# Patient Record
Sex: MALE | Race: WHITE | NOT HISPANIC OR LATINO | Employment: OTHER | URBAN - METROPOLITAN AREA
[De-identification: names, ages, dates, MRNs, and addresses within clinical notes are randomized per-mention and may not be internally consistent; named-entity substitution may affect disease eponyms.]

---

## 2017-10-23 ENCOUNTER — OFFICE VISIT - HEALTHEAST (OUTPATIENT)
Dept: FAMILY MEDICINE | Facility: CLINIC | Age: 32
End: 2017-10-23

## 2017-10-23 DIAGNOSIS — F33.42 RECURRENT MAJOR DEPRESSIVE DISORDER, IN FULL REMISSION (H): ICD-10-CM

## 2017-10-23 DIAGNOSIS — G43.009 MIGRAINE WITHOUT AURA AND WITHOUT STATUS MIGRAINOSUS, NOT INTRACTABLE: ICD-10-CM

## 2017-10-23 DIAGNOSIS — Z72.0 TOBACCO ABUSE: ICD-10-CM

## 2017-10-23 DIAGNOSIS — Z00.00 HEALTH CARE MAINTENANCE: ICD-10-CM

## 2017-10-23 DIAGNOSIS — F41.9 ANXIETY: ICD-10-CM

## 2017-10-23 DIAGNOSIS — I10 ESSENTIAL HYPERTENSION: ICD-10-CM

## 2017-10-23 LAB
CHOLEST SERPL-MCNC: 126 MG/DL
FASTING STATUS PATIENT QL REPORTED: YES
HDLC SERPL-MCNC: 29 MG/DL
LDLC SERPL CALC-MCNC: 73 MG/DL
TRIGL SERPL-MCNC: 119 MG/DL

## 2017-10-23 ASSESSMENT — MIFFLIN-ST. JEOR: SCORE: 2257.61

## 2017-11-27 ENCOUNTER — COMMUNICATION - HEALTHEAST (OUTPATIENT)
Dept: FAMILY MEDICINE | Facility: CLINIC | Age: 32
End: 2017-11-27

## 2017-12-07 ENCOUNTER — OFFICE VISIT - HEALTHEAST (OUTPATIENT)
Dept: FAMILY MEDICINE | Facility: CLINIC | Age: 32
End: 2017-12-07

## 2017-12-07 DIAGNOSIS — F33.1 MODERATE EPISODE OF RECURRENT MAJOR DEPRESSIVE DISORDER (H): ICD-10-CM

## 2017-12-07 DIAGNOSIS — M77.9 TENDONITIS: ICD-10-CM

## 2017-12-07 DIAGNOSIS — I10 ESSENTIAL HYPERTENSION: ICD-10-CM

## 2017-12-07 DIAGNOSIS — Z72.0 TOBACCO ABUSE: ICD-10-CM

## 2017-12-07 DIAGNOSIS — F41.9 ANXIETY: ICD-10-CM

## 2017-12-07 ASSESSMENT — MIFFLIN-ST. JEOR: SCORE: 2302.97

## 2018-02-28 ENCOUNTER — COMMUNICATION - HEALTHEAST (OUTPATIENT)
Dept: FAMILY MEDICINE | Facility: CLINIC | Age: 33
End: 2018-02-28

## 2018-03-28 ENCOUNTER — COMMUNICATION - HEALTHEAST (OUTPATIENT)
Dept: FAMILY MEDICINE | Facility: CLINIC | Age: 33
End: 2018-03-28

## 2019-03-12 ENCOUNTER — OFFICE VISIT - HEALTHEAST (OUTPATIENT)
Dept: FAMILY MEDICINE | Facility: CLINIC | Age: 34
End: 2019-03-12

## 2019-03-12 DIAGNOSIS — Z79.899 MEDICATION MANAGEMENT: ICD-10-CM

## 2019-03-12 DIAGNOSIS — Z00.00 ROUTINE GENERAL MEDICAL EXAMINATION AT A HEALTH CARE FACILITY: ICD-10-CM

## 2019-03-12 DIAGNOSIS — I10 ESSENTIAL HYPERTENSION: ICD-10-CM

## 2019-03-12 DIAGNOSIS — Z13.220 LIPID SCREENING: ICD-10-CM

## 2019-03-12 DIAGNOSIS — F17.200 SMOKING: ICD-10-CM

## 2019-03-12 DIAGNOSIS — F15.11 METHAMPHETAMINE ABUSE IN REMISSION (H): ICD-10-CM

## 2019-03-12 DIAGNOSIS — F33.0 MILD EPISODE OF RECURRENT MAJOR DEPRESSIVE DISORDER (H): ICD-10-CM

## 2019-03-12 DIAGNOSIS — E66.01 MORBID OBESITY (H): ICD-10-CM

## 2019-03-12 DIAGNOSIS — F41.9 ANXIETY: ICD-10-CM

## 2019-03-12 LAB
ANION GAP SERPL CALCULATED.3IONS-SCNC: 8 MMOL/L (ref 5–18)
BUN SERPL-MCNC: 14 MG/DL (ref 8–22)
CALCIUM SERPL-MCNC: 9.5 MG/DL (ref 8.5–10.5)
CHLORIDE BLD-SCNC: 106 MMOL/L (ref 98–107)
CHOLEST SERPL-MCNC: 145 MG/DL
CO2 SERPL-SCNC: 25 MMOL/L (ref 22–31)
CREAT SERPL-MCNC: 0.83 MG/DL (ref 0.7–1.3)
FASTING STATUS PATIENT QL REPORTED: YES
GFR SERPL CREATININE-BSD FRML MDRD: >60 ML/MIN/1.73M2
GLUCOSE BLD-MCNC: 103 MG/DL (ref 70–125)
HDLC SERPL-MCNC: 34 MG/DL
LDLC SERPL CALC-MCNC: 58 MG/DL
POTASSIUM BLD-SCNC: 4.1 MMOL/L (ref 3.5–5)
SODIUM SERPL-SCNC: 139 MMOL/L (ref 136–145)
TRIGL SERPL-MCNC: 267 MG/DL

## 2019-03-17 ENCOUNTER — COMMUNICATION - HEALTHEAST (OUTPATIENT)
Dept: FAMILY MEDICINE | Facility: CLINIC | Age: 34
End: 2019-03-17

## 2019-03-27 ENCOUNTER — COMMUNICATION - HEALTHEAST (OUTPATIENT)
Dept: FAMILY MEDICINE | Facility: CLINIC | Age: 34
End: 2019-03-27

## 2019-03-27 DIAGNOSIS — F41.9 ANXIETY: ICD-10-CM

## 2019-03-27 DIAGNOSIS — F33.1 MODERATE EPISODE OF RECURRENT MAJOR DEPRESSIVE DISORDER (H): ICD-10-CM

## 2019-04-04 ENCOUNTER — COMMUNICATION - HEALTHEAST (OUTPATIENT)
Dept: SCHEDULING | Facility: CLINIC | Age: 34
End: 2019-04-04

## 2019-04-04 DIAGNOSIS — I10 ESSENTIAL HYPERTENSION: ICD-10-CM

## 2019-04-05 ENCOUNTER — COMMUNICATION - HEALTHEAST (OUTPATIENT)
Dept: FAMILY MEDICINE | Facility: CLINIC | Age: 34
End: 2019-04-05

## 2019-04-05 DIAGNOSIS — I10 ESSENTIAL HYPERTENSION: ICD-10-CM

## 2019-05-25 ENCOUNTER — COMMUNICATION - HEALTHEAST (OUTPATIENT)
Dept: FAMILY MEDICINE | Facility: CLINIC | Age: 34
End: 2019-05-25

## 2019-05-25 DIAGNOSIS — G43.009 MIGRAINE WITHOUT AURA AND WITHOUT STATUS MIGRAINOSUS, NOT INTRACTABLE: ICD-10-CM

## 2019-11-05 ENCOUNTER — COMMUNICATION - HEALTHEAST (OUTPATIENT)
Dept: TELEHEALTH | Facility: CLINIC | Age: 34
End: 2019-11-05

## 2019-11-05 ENCOUNTER — OFFICE VISIT - HEALTHEAST (OUTPATIENT)
Dept: FAMILY MEDICINE | Facility: CLINIC | Age: 34
End: 2019-11-05

## 2019-11-05 ENCOUNTER — COMMUNICATION - HEALTHEAST (OUTPATIENT)
Dept: SCHEDULING | Facility: CLINIC | Age: 34
End: 2019-11-05

## 2019-11-05 DIAGNOSIS — F41.9 ANXIETY: ICD-10-CM

## 2019-11-05 DIAGNOSIS — I10 ESSENTIAL HYPERTENSION: ICD-10-CM

## 2019-11-05 DIAGNOSIS — N48.9 PENILE LESION: ICD-10-CM

## 2019-11-05 RX ORDER — IBUPROFEN 200 MG
200-400 TABLET ORAL
Status: SHIPPED | COMMUNITY
Start: 2019-11-05 | End: 2023-08-26

## 2019-11-06 LAB — T PALLIDUM AB SER QL: NEGATIVE

## 2019-11-07 ENCOUNTER — COMMUNICATION - HEALTHEAST (OUTPATIENT)
Dept: FAMILY MEDICINE | Facility: CLINIC | Age: 34
End: 2019-11-07

## 2019-12-20 ENCOUNTER — COMMUNICATION - HEALTHEAST (OUTPATIENT)
Dept: SCHEDULING | Facility: CLINIC | Age: 34
End: 2019-12-20

## 2020-02-17 ENCOUNTER — COMMUNICATION - HEALTHEAST (OUTPATIENT)
Dept: FAMILY MEDICINE | Facility: CLINIC | Age: 35
End: 2020-02-17

## 2020-03-19 ENCOUNTER — COMMUNICATION - HEALTHEAST (OUTPATIENT)
Dept: FAMILY MEDICINE | Facility: CLINIC | Age: 35
End: 2020-03-19

## 2020-03-19 DIAGNOSIS — I10 ESSENTIAL HYPERTENSION: ICD-10-CM

## 2020-04-27 ENCOUNTER — COMMUNICATION - HEALTHEAST (OUTPATIENT)
Dept: FAMILY MEDICINE | Facility: CLINIC | Age: 35
End: 2020-04-27

## 2020-04-27 ENCOUNTER — OFFICE VISIT - HEALTHEAST (OUTPATIENT)
Dept: FAMILY MEDICINE | Facility: CLINIC | Age: 35
End: 2020-04-27

## 2020-04-27 DIAGNOSIS — F51.05 INSOMNIA DUE TO OTHER MENTAL DISORDER: ICD-10-CM

## 2020-04-27 DIAGNOSIS — F41.9 ANXIETY: ICD-10-CM

## 2020-04-27 DIAGNOSIS — I10 ESSENTIAL HYPERTENSION: ICD-10-CM

## 2020-04-27 DIAGNOSIS — F99 INSOMNIA DUE TO OTHER MENTAL DISORDER: ICD-10-CM

## 2020-04-27 DIAGNOSIS — F33.1 MODERATE EPISODE OF RECURRENT MAJOR DEPRESSIVE DISORDER (H): ICD-10-CM

## 2020-04-27 DIAGNOSIS — F90.2 ATTENTION DEFICIT HYPERACTIVITY DISORDER (ADHD), COMBINED TYPE: ICD-10-CM

## 2020-05-11 ENCOUNTER — OFFICE VISIT - HEALTHEAST (OUTPATIENT)
Dept: FAMILY MEDICINE | Facility: CLINIC | Age: 35
End: 2020-05-11

## 2020-05-11 DIAGNOSIS — F33.1 MODERATE EPISODE OF RECURRENT MAJOR DEPRESSIVE DISORDER (H): ICD-10-CM

## 2020-05-11 DIAGNOSIS — E66.01 MORBID OBESITY (H): ICD-10-CM

## 2020-05-11 DIAGNOSIS — F41.9 ANXIETY: ICD-10-CM

## 2020-05-11 DIAGNOSIS — F99 INSOMNIA DUE TO OTHER MENTAL DISORDER: ICD-10-CM

## 2020-05-11 DIAGNOSIS — I10 ESSENTIAL HYPERTENSION: ICD-10-CM

## 2020-05-11 DIAGNOSIS — F51.05 INSOMNIA DUE TO OTHER MENTAL DISORDER: ICD-10-CM

## 2020-05-26 ENCOUNTER — OFFICE VISIT - HEALTHEAST (OUTPATIENT)
Dept: FAMILY MEDICINE | Facility: CLINIC | Age: 35
End: 2020-05-26

## 2020-05-26 DIAGNOSIS — F90.2 ATTENTION DEFICIT HYPERACTIVITY DISORDER (ADHD), COMBINED TYPE: ICD-10-CM

## 2020-05-26 DIAGNOSIS — I10 ESSENTIAL HYPERTENSION: ICD-10-CM

## 2020-05-26 DIAGNOSIS — F41.9 ANXIETY: ICD-10-CM

## 2020-05-26 DIAGNOSIS — Z72.0 TOBACCO ABUSE: ICD-10-CM

## 2020-05-26 DIAGNOSIS — F33.1 MODERATE EPISODE OF RECURRENT MAJOR DEPRESSIVE DISORDER (H): ICD-10-CM

## 2020-06-09 ENCOUNTER — OFFICE VISIT - HEALTHEAST (OUTPATIENT)
Dept: FAMILY MEDICINE | Facility: CLINIC | Age: 35
End: 2020-06-09

## 2020-06-09 DIAGNOSIS — I10 ESSENTIAL HYPERTENSION: ICD-10-CM

## 2020-06-09 DIAGNOSIS — F41.9 ANXIETY: ICD-10-CM

## 2020-06-09 DIAGNOSIS — F33.1 MODERATE EPISODE OF RECURRENT MAJOR DEPRESSIVE DISORDER (H): ICD-10-CM

## 2020-06-09 DIAGNOSIS — Z72.0 TOBACCO ABUSE: ICD-10-CM

## 2020-06-09 DIAGNOSIS — E66.09 OBESITY DUE TO EXCESS CALORIES WITH SERIOUS COMORBIDITY, UNSPECIFIED CLASSIFICATION: ICD-10-CM

## 2020-06-11 ENCOUNTER — COMMUNICATION - HEALTHEAST (OUTPATIENT)
Dept: FAMILY MEDICINE | Facility: CLINIC | Age: 35
End: 2020-06-11

## 2020-06-23 ENCOUNTER — COMMUNICATION - HEALTHEAST (OUTPATIENT)
Dept: FAMILY MEDICINE | Facility: CLINIC | Age: 35
End: 2020-06-23

## 2020-06-23 DIAGNOSIS — F90.2 ATTENTION DEFICIT HYPERACTIVITY DISORDER (ADHD), COMBINED TYPE: ICD-10-CM

## 2020-06-25 ENCOUNTER — OFFICE VISIT - HEALTHEAST (OUTPATIENT)
Dept: FAMILY MEDICINE | Facility: CLINIC | Age: 35
End: 2020-06-25

## 2020-06-25 DIAGNOSIS — Z72.0 TOBACCO ABUSE: ICD-10-CM

## 2020-06-25 DIAGNOSIS — F33.1 MODERATE EPISODE OF RECURRENT MAJOR DEPRESSIVE DISORDER (H): ICD-10-CM

## 2020-06-25 DIAGNOSIS — E66.01 MORBID OBESITY (H): ICD-10-CM

## 2020-06-25 DIAGNOSIS — I10 ESSENTIAL HYPERTENSION: ICD-10-CM

## 2020-07-15 ENCOUNTER — COMMUNICATION - HEALTHEAST (OUTPATIENT)
Dept: FAMILY MEDICINE | Facility: CLINIC | Age: 35
End: 2020-07-15

## 2020-07-15 ENCOUNTER — OFFICE VISIT - HEALTHEAST (OUTPATIENT)
Dept: FAMILY MEDICINE | Facility: CLINIC | Age: 35
End: 2020-07-15

## 2020-07-15 DIAGNOSIS — Z13.228 SCREENING FOR METABOLIC DISORDER: ICD-10-CM

## 2020-07-15 DIAGNOSIS — I10 ESSENTIAL HYPERTENSION: ICD-10-CM

## 2020-07-15 DIAGNOSIS — Z13.29 SCREENING FOR THYROID DISORDER: ICD-10-CM

## 2020-07-15 DIAGNOSIS — Z13.0 SCREENING, ANEMIA, DEFICIENCY, IRON: ICD-10-CM

## 2020-07-15 DIAGNOSIS — Z13.21 SCREENING FOR ENDOCRINE, NUTRITIONAL, METABOLIC AND IMMUNITY DISORDER: ICD-10-CM

## 2020-07-15 DIAGNOSIS — Z13.29 SCREENING FOR ENDOCRINE, NUTRITIONAL, METABOLIC AND IMMUNITY DISORDER: ICD-10-CM

## 2020-07-15 DIAGNOSIS — E66.01 CLASS 3 SEVERE OBESITY DUE TO EXCESS CALORIES WITH SERIOUS COMORBIDITY AND BODY MASS INDEX (BMI) OF 40.0 TO 44.9 IN ADULT (H): ICD-10-CM

## 2020-07-15 DIAGNOSIS — Z13.228 SCREENING FOR ENDOCRINE, NUTRITIONAL, METABOLIC AND IMMUNITY DISORDER: ICD-10-CM

## 2020-07-15 DIAGNOSIS — E66.813 CLASS 3 SEVERE OBESITY DUE TO EXCESS CALORIES WITH SERIOUS COMORBIDITY AND BODY MASS INDEX (BMI) OF 40.0 TO 44.9 IN ADULT (H): ICD-10-CM

## 2020-07-15 DIAGNOSIS — Z13.0 SCREENING FOR ENDOCRINE, NUTRITIONAL, METABOLIC AND IMMUNITY DISORDER: ICD-10-CM

## 2020-07-15 DIAGNOSIS — Z13.1 SCREENING FOR DIABETES MELLITUS: ICD-10-CM

## 2020-07-15 DIAGNOSIS — Z13.220 SCREENING, LIPID: ICD-10-CM

## 2020-07-15 ASSESSMENT — PATIENT HEALTH QUESTIONNAIRE - PHQ9: SUM OF ALL RESPONSES TO PHQ QUESTIONS 1-9: 0

## 2020-07-15 NOTE — ASSESSMENT & PLAN NOTE
ASSESSMENT/ PLAN  PATIENT IS BEGINNING ACTIVE MEDICALLY SUPERVISED WEIGHT LOSS STARTING AT Body mass index is 40.18 kg/m .     Patient declined 24 week weight loss program due to cost  Patient declined 3 pack health coaching due to cost.   We did not discuss food supplements.     We discussed surgical weight loss options given his BMI is 43 and he  has comorbid conditions: htn    Medications started today: none, wants to see if insurance will cover saxenda.     Plan dietician visit asap.   meal replace sent link. May be beneifical as he says he eats a lot of convenience foods (was eating chips during out video visit)    Complete intake questionnaire and schedule follow up to discuss the answers.     Lab assessment plan:   Orders Placed This Encounter   Procedures     Comprehensive Metabolic Panel     Glycosylated Hemoglobin A1c     Lipid Cascade     Vitamin D, Total (25-Hydroxy)     Thyroid Cascade          Follow up in 1 month with me and dietician asap.     DISCUSSION _________________________________________________________________    Dx:  Initial bmi is Body mass index is 40.18 kg/m .  With the following weight related comorbid medical conditions: htn    BECAUSE OF ABOVE PROBLEMS IT IS STRONGLY ADVISED THAT Chu LOSE WEIGHT.  BELOW IS MY PLAN FOR him     Shy Kamara MD  is his primary care provider - who referred him here today for help with weight loss.    Start weight 297 lbs, Body mass index is 40.18 kg/m .  7/16/2020     Medication notes:  Medications he takes, that are known to increase weight:prozac  Medications he takes, known to decrease weight: adderall, wellbutrin.   Medications he takes whose dose may be affected by weight changes:  Lisinopril, amlodipine, prozac    NALTREXONE  7/16/2020 START    WELLBUTRIN 300MG 24 HR TAB   rx by pcp taking as of intake 7/16/2020     ADDERALL  rx by another provider taking as of intake 7/16/2020     PHENTERMINE   Contraindicated due to concurrentuse  adderall and hx meth abuse (in remission)    DAVIN  Will ask if covered by insurance    [facility]   Could consider    Potential barriers to weight loss identified thus far:   -unemployed  -single parent, lives with friend, not much support  -overweight since childhood.  -hx of mentalhealth and chem dep issues  -lack of exercise (too tired)  -eating too much  -night eating syndrome  -eats lots of simple carbs and sugar drinks  -compulsive and emotional eating  -craves fast food, sweets, candy, chocolate, chips, crackers, cheese  -endorses snoring, witnessed apnea, and being tired during the day - may benefit from sleep consult      Strengths that may help weight loss:  -wants to live for his kids, doesn't want to have a heart attack.  -does his own food shopping  -drinks water  -likes veggies

## 2020-07-16 ENCOUNTER — COMMUNICATION - HEALTHEAST (OUTPATIENT)
Dept: FAMILY MEDICINE | Facility: CLINIC | Age: 35
End: 2020-07-16

## 2020-07-16 ENCOUNTER — OFFICE VISIT - HEALTHEAST (OUTPATIENT)
Dept: FAMILY MEDICINE | Facility: CLINIC | Age: 35
End: 2020-07-16

## 2020-07-16 DIAGNOSIS — F90.0 ATTENTION DEFICIT HYPERACTIVITY DISORDER (ADHD), PREDOMINANTLY INATTENTIVE TYPE: ICD-10-CM

## 2020-07-16 DIAGNOSIS — F33.1 MODERATE EPISODE OF RECURRENT MAJOR DEPRESSIVE DISORDER (H): ICD-10-CM

## 2020-07-16 DIAGNOSIS — F15.11 METHAMPHETAMINE ABUSE IN REMISSION (H): ICD-10-CM

## 2020-07-28 ENCOUNTER — COMMUNICATION - HEALTHEAST (OUTPATIENT)
Dept: FAMILY MEDICINE | Facility: CLINIC | Age: 35
End: 2020-07-28

## 2020-07-31 ENCOUNTER — OFFICE VISIT - HEALTHEAST (OUTPATIENT)
Dept: FAMILY MEDICINE | Facility: CLINIC | Age: 35
End: 2020-07-31

## 2020-07-31 DIAGNOSIS — F41.9 ANXIETY: ICD-10-CM

## 2020-07-31 DIAGNOSIS — F90.0 ATTENTION DEFICIT HYPERACTIVITY DISORDER (ADHD), PREDOMINANTLY INATTENTIVE TYPE: ICD-10-CM

## 2020-07-31 DIAGNOSIS — F33.1 MODERATE EPISODE OF RECURRENT MAJOR DEPRESSIVE DISORDER (H): ICD-10-CM

## 2020-07-31 DIAGNOSIS — I10 ESSENTIAL HYPERTENSION: ICD-10-CM

## 2020-08-07 ENCOUNTER — OFFICE VISIT - HEALTHEAST (OUTPATIENT)
Dept: FAMILY MEDICINE | Facility: CLINIC | Age: 35
End: 2020-08-07

## 2020-08-07 DIAGNOSIS — Z11.3 ROUTINE SCREENING FOR STI (SEXUALLY TRANSMITTED INFECTION): ICD-10-CM

## 2020-08-07 DIAGNOSIS — Z00.00 HEALTHCARE MAINTENANCE: ICD-10-CM

## 2020-08-07 DIAGNOSIS — I10 ESSENTIAL HYPERTENSION: ICD-10-CM

## 2020-08-07 DIAGNOSIS — F90.0 ATTENTION DEFICIT HYPERACTIVITY DISORDER (ADHD), PREDOMINANTLY INATTENTIVE TYPE: ICD-10-CM

## 2020-08-07 LAB
ALBUMIN SERPL-MCNC: 3.9 G/DL (ref 3.5–5)
ALP SERPL-CCNC: 61 U/L (ref 45–120)
ALT SERPL W P-5'-P-CCNC: 27 U/L (ref 0–45)
ANION GAP SERPL CALCULATED.3IONS-SCNC: 10 MMOL/L (ref 5–18)
AST SERPL W P-5'-P-CCNC: 22 U/L (ref 0–40)
BILIRUB SERPL-MCNC: 0.5 MG/DL (ref 0–1)
BUN SERPL-MCNC: 7 MG/DL (ref 8–22)
CALCIUM SERPL-MCNC: 9.6 MG/DL (ref 8.5–10.5)
CHLORIDE BLD-SCNC: 105 MMOL/L (ref 98–107)
CHOLEST SERPL-MCNC: 137 MG/DL
CO2 SERPL-SCNC: 25 MMOL/L (ref 22–31)
CREAT SERPL-MCNC: 0.83 MG/DL (ref 0.7–1.3)
FASTING STATUS PATIENT QL REPORTED: ABNORMAL
GFR SERPL CREATININE-BSD FRML MDRD: >60 ML/MIN/1.73M2
GLUCOSE BLD-MCNC: 115 MG/DL (ref 70–125)
HDLC SERPL-MCNC: 37 MG/DL
HIV 1+2 AB+HIV1 P24 AG SERPL QL IA: NEGATIVE
LDLC SERPL CALC-MCNC: 70 MG/DL
POTASSIUM BLD-SCNC: 4.7 MMOL/L (ref 3.5–5)
PROT SERPL-MCNC: 6.7 G/DL (ref 6–8)
SODIUM SERPL-SCNC: 140 MMOL/L (ref 136–145)
TRIGL SERPL-MCNC: 151 MG/DL

## 2020-08-07 ASSESSMENT — MIFFLIN-ST. JEOR: SCORE: 2322.24

## 2020-08-08 ENCOUNTER — COMMUNICATION - HEALTHEAST (OUTPATIENT)
Dept: FAMILY MEDICINE | Facility: CLINIC | Age: 35
End: 2020-08-08

## 2020-08-08 LAB — T PALLIDUM AB SER QL: NEGATIVE

## 2020-08-10 ENCOUNTER — COMMUNICATION - HEALTHEAST (OUTPATIENT)
Dept: FAMILY MEDICINE | Facility: CLINIC | Age: 35
End: 2020-08-10

## 2020-08-10 LAB
HAV IGM SERPL QL IA: NEGATIVE
HBV CORE IGM SERPL QL IA: NEGATIVE
HBV SURFACE AG SERPL QL IA: NEGATIVE
HCV AB SERPL QL IA: NEGATIVE

## 2020-08-14 ENCOUNTER — OFFICE VISIT - HEALTHEAST (OUTPATIENT)
Dept: FAMILY MEDICINE | Facility: CLINIC | Age: 35
End: 2020-08-14

## 2020-08-14 DIAGNOSIS — E66.813 CLASS 3 SEVERE OBESITY DUE TO EXCESS CALORIES WITH SERIOUS COMORBIDITY AND BODY MASS INDEX (BMI) OF 40.0 TO 44.9 IN ADULT (H): ICD-10-CM

## 2020-08-14 DIAGNOSIS — I10 ESSENTIAL HYPERTENSION: ICD-10-CM

## 2020-08-14 DIAGNOSIS — F90.0 ATTENTION DEFICIT HYPERACTIVITY DISORDER (ADHD), PREDOMINANTLY INATTENTIVE TYPE: ICD-10-CM

## 2020-08-14 DIAGNOSIS — E66.01 CLASS 3 SEVERE OBESITY DUE TO EXCESS CALORIES WITH SERIOUS COMORBIDITY AND BODY MASS INDEX (BMI) OF 40.0 TO 44.9 IN ADULT (H): ICD-10-CM

## 2020-08-26 ENCOUNTER — OFFICE VISIT - HEALTHEAST (OUTPATIENT)
Dept: BEHAVIORAL HEALTH | Facility: CLINIC | Age: 35
End: 2020-08-26

## 2020-08-26 DIAGNOSIS — F32.1 CURRENT MODERATE EPISODE OF MAJOR DEPRESSIVE DISORDER, UNSPECIFIED WHETHER RECURRENT (H): ICD-10-CM

## 2020-09-08 ENCOUNTER — COMMUNICATION - HEALTHEAST (OUTPATIENT)
Dept: BEHAVIORAL HEALTH | Facility: CLINIC | Age: 35
End: 2020-09-08

## 2020-09-08 DIAGNOSIS — F32.1 CURRENT MODERATE EPISODE OF MAJOR DEPRESSIVE DISORDER, UNSPECIFIED WHETHER RECURRENT (H): ICD-10-CM

## 2020-09-13 ENCOUNTER — COMMUNICATION - HEALTHEAST (OUTPATIENT)
Dept: BEHAVIORAL HEALTH | Facility: CLINIC | Age: 35
End: 2020-09-13

## 2020-09-14 ENCOUNTER — COMMUNICATION - HEALTHEAST (OUTPATIENT)
Dept: BEHAVIORAL HEALTH | Facility: CLINIC | Age: 35
End: 2020-09-14

## 2020-09-14 ENCOUNTER — COMMUNICATION - HEALTHEAST (OUTPATIENT)
Dept: FAMILY MEDICINE | Facility: CLINIC | Age: 35
End: 2020-09-14

## 2020-09-14 DIAGNOSIS — F90.0 ATTENTION DEFICIT HYPERACTIVITY DISORDER (ADHD), PREDOMINANTLY INATTENTIVE TYPE: ICD-10-CM

## 2020-10-07 ENCOUNTER — OFFICE VISIT - HEALTHEAST (OUTPATIENT)
Dept: BEHAVIORAL HEALTH | Facility: CLINIC | Age: 35
End: 2020-10-07

## 2020-10-07 ENCOUNTER — COMMUNICATION - HEALTHEAST (OUTPATIENT)
Dept: BEHAVIORAL HEALTH | Facility: CLINIC | Age: 35
End: 2020-10-07

## 2020-10-07 DIAGNOSIS — F90.0 ATTENTION DEFICIT HYPERACTIVITY DISORDER (ADHD), PREDOMINANTLY INATTENTIVE TYPE: ICD-10-CM

## 2020-10-07 DIAGNOSIS — F32.1 CURRENT MODERATE EPISODE OF MAJOR DEPRESSIVE DISORDER, UNSPECIFIED WHETHER RECURRENT (H): ICD-10-CM

## 2020-10-08 ENCOUNTER — COMMUNICATION - HEALTHEAST (OUTPATIENT)
Dept: BEHAVIORAL HEALTH | Facility: CLINIC | Age: 35
End: 2020-10-08

## 2020-10-08 ENCOUNTER — COMMUNICATION - HEALTHEAST (OUTPATIENT)
Dept: FAMILY MEDICINE | Facility: CLINIC | Age: 35
End: 2020-10-08

## 2020-10-08 ENCOUNTER — AMBULATORY - HEALTHEAST (OUTPATIENT)
Dept: FAMILY MEDICINE | Facility: CLINIC | Age: 35
End: 2020-10-08

## 2020-10-08 DIAGNOSIS — F40.243 ANXIETY WITH FLYING: ICD-10-CM

## 2020-10-08 RX ORDER — LORAZEPAM 0.5 MG/1
0.5 TABLET ORAL 2 TIMES DAILY PRN
Qty: 4 TABLET | Refills: 0 | Status: SHIPPED | OUTPATIENT
Start: 2020-10-08 | End: 2021-09-16

## 2020-10-09 ENCOUNTER — COMMUNICATION - HEALTHEAST (OUTPATIENT)
Dept: BEHAVIORAL HEALTH | Facility: CLINIC | Age: 35
End: 2020-10-09

## 2020-10-09 ENCOUNTER — COMMUNICATION - HEALTHEAST (OUTPATIENT)
Dept: FAMILY MEDICINE | Facility: CLINIC | Age: 35
End: 2020-10-09

## 2020-10-09 DIAGNOSIS — I10 ESSENTIAL HYPERTENSION: ICD-10-CM

## 2020-10-09 DIAGNOSIS — F90.0 ATTENTION DEFICIT HYPERACTIVITY DISORDER (ADHD), PREDOMINANTLY INATTENTIVE TYPE: ICD-10-CM

## 2020-10-09 DIAGNOSIS — F40.243 ANXIETY WITH FLYING: ICD-10-CM

## 2020-10-12 ENCOUNTER — COMMUNICATION - HEALTHEAST (OUTPATIENT)
Dept: BEHAVIORAL HEALTH | Facility: CLINIC | Age: 35
End: 2020-10-12

## 2020-10-12 DIAGNOSIS — F90.0 ATTENTION DEFICIT HYPERACTIVITY DISORDER (ADHD), PREDOMINANTLY INATTENTIVE TYPE: ICD-10-CM

## 2020-10-12 RX ORDER — AMLODIPINE BESYLATE 10 MG/1
10 TABLET ORAL DAILY
Qty: 90 TABLET | Refills: 3 | Status: SHIPPED | OUTPATIENT
Start: 2020-10-12 | End: 2022-12-27

## 2020-10-14 ENCOUNTER — COMMUNICATION - HEALTHEAST (OUTPATIENT)
Dept: BEHAVIORAL HEALTH | Facility: CLINIC | Age: 35
End: 2020-10-14

## 2020-10-28 ENCOUNTER — OFFICE VISIT - HEALTHEAST (OUTPATIENT)
Dept: BEHAVIORAL HEALTH | Facility: CLINIC | Age: 35
End: 2020-10-28

## 2020-10-28 DIAGNOSIS — F90.0 ATTENTION DEFICIT HYPERACTIVITY DISORDER (ADHD), PREDOMINANTLY INATTENTIVE TYPE: ICD-10-CM

## 2020-12-10 ENCOUNTER — COMMUNICATION - HEALTHEAST (OUTPATIENT)
Dept: SCHEDULING | Facility: CLINIC | Age: 35
End: 2020-12-10

## 2020-12-10 DIAGNOSIS — F90.0 ATTENTION DEFICIT HYPERACTIVITY DISORDER (ADHD), PREDOMINANTLY INATTENTIVE TYPE: ICD-10-CM

## 2020-12-11 ENCOUNTER — COMMUNICATION - HEALTHEAST (OUTPATIENT)
Dept: BEHAVIORAL HEALTH | Facility: CLINIC | Age: 35
End: 2020-12-11

## 2020-12-11 ENCOUNTER — COMMUNICATION - HEALTHEAST (OUTPATIENT)
Dept: SCHEDULING | Facility: CLINIC | Age: 35
End: 2020-12-11

## 2020-12-11 DIAGNOSIS — F90.0 ATTENTION DEFICIT HYPERACTIVITY DISORDER (ADHD), PREDOMINANTLY INATTENTIVE TYPE: ICD-10-CM

## 2020-12-14 ENCOUNTER — COMMUNICATION - HEALTHEAST (OUTPATIENT)
Dept: FAMILY MEDICINE | Facility: CLINIC | Age: 35
End: 2020-12-14

## 2021-01-12 ENCOUNTER — COMMUNICATION - HEALTHEAST (OUTPATIENT)
Dept: BEHAVIORAL HEALTH | Facility: CLINIC | Age: 36
End: 2021-01-12

## 2021-01-12 DIAGNOSIS — F90.0 ATTENTION DEFICIT HYPERACTIVITY DISORDER (ADHD), PREDOMINANTLY INATTENTIVE TYPE: ICD-10-CM

## 2021-01-13 ENCOUNTER — OFFICE VISIT - HEALTHEAST (OUTPATIENT)
Dept: BEHAVIORAL HEALTH | Facility: CLINIC | Age: 36
End: 2021-01-13

## 2021-01-13 DIAGNOSIS — F90.0 ATTENTION DEFICIT HYPERACTIVITY DISORDER (ADHD), PREDOMINANTLY INATTENTIVE TYPE: ICD-10-CM

## 2021-01-13 RX ORDER — DEXTROAMPHETAMINE SACCHARATE, AMPHETAMINE ASPARTATE, DEXTROAMPHETAMINE SULFATE AND AMPHETAMINE SULFATE 7.5; 7.5; 7.5; 7.5 MG/1; MG/1; MG/1; MG/1
30 TABLET ORAL DAILY
Qty: 30 TABLET | Refills: 0 | Status: SHIPPED | OUTPATIENT
Start: 2021-01-13 | End: 2021-08-05

## 2021-01-13 RX ORDER — DEXTROAMPHETAMINE SACCHARATE, AMPHETAMINE ASPARTATE, DEXTROAMPHETAMINE SULFATE AND AMPHETAMINE SULFATE 2.5; 2.5; 2.5; 2.5 MG/1; MG/1; MG/1; MG/1
10 TABLET ORAL DAILY
Qty: 30 TABLET | Refills: 0 | Status: SHIPPED | OUTPATIENT
Start: 2021-01-13 | End: 2021-08-05

## 2021-05-27 ASSESSMENT — PATIENT HEALTH QUESTIONNAIRE - PHQ9: SUM OF ALL RESPONSES TO PHQ QUESTIONS 1-9: 0

## 2021-05-27 NOTE — TELEPHONE ENCOUNTER
RN cannot approve Refill Request    RN can NOT refill this medication pt stated is taking 40 mg.     Katelin Dos Santos, [facility] Triage/Med Refill 3/27/2019    Requested Prescriptions   Pending Prescriptions Disp Refills     citalopram (CELEXA) 20 MG tablet 90 tablet 3     Sig: Take 1 tablet (20 mg total) by mouth daily.    SSRI Refill Protocol  Passed - 3/27/2019  4:55 PM       Passed - PCP or prescribing provider visit in last year    Last office visit with prescriber/PCP: 12/7/2017 Shy Kamara MD OR same dept: Visit date not found OR same specialty: 12/7/2017 Shy Kamara MD  Last physical: Visit date not found Last MTM visit: Visit date not found   Next visit within 3 mo: Visit date not found  Next physical within 3 mo: Visit date not found  Prescriber OR PCP: Shy Kamara MD  Last diagnosis associated with med order: 1. Anxiety  - citalopram (CELEXA) 20 MG tablet; Take 1 tablet (20 mg total) by mouth daily.  Dispense: 90 tablet; Refill: 3    2. Moderate episode of recurrent major depressive disorder (H)  - citalopram (CELEXA) 20 MG tablet; Take 1 tablet (20 mg total) by mouth daily.  Dispense: 90 tablet; Refill: 3    If protocol passes may refill for 12 months if within 3 months of last provider visit (or a total of 15 months).

## 2021-05-27 NOTE — TELEPHONE ENCOUNTER
"Pt called in states he need new Rx for HTN.  The medication was amLODIPine (NORVASC) 10 MG tablet with direction of Take 10 mg by mouth daily. - Oral.  On call provider was paged.   Dr. Dexter gave verbal okay to give him 90 day supply with 3 more refill.  The Rx is sent to the pharmacy, Pt was notified, no other concern at this time.       [place] RN, [facility] Triage/Med Refill 4/4/2019 8:04 PM        Reason for Disposition    [1] Request for URGENT new prescription or refill of \"essential\" medication (i.e., likelihood of harm to patient if not taken) AND [2] triager unable to fill per unit policy    Protocols used: MEDICATION QUESTION CALL-A-AH      "

## 2021-05-27 NOTE — TELEPHONE ENCOUNTER
Gabapentin pended for approval      Per 3/12/2019 OV note:   3. Anxiety  He continues Citalopram and Gabapentin.  Will refer to psychiatry and counseling.   - Ambulatory referral to Psychiatry  - Ambulatory referral to Psychology     4. Mild episode of recurrent major depressive disorder (H)  He continues Citalopram and Gabapentin.  Will refer to psychiatry and counseling.   - Ambulatory referral to Psychiatry  - Ambulatory referral to Psychology

## 2021-05-27 NOTE — TELEPHONE ENCOUNTER
Medication Request  Medication name: gabapentin (NEURONTIN) 100 MG capsule  Pharmacy Name and Location: [facility] in [place], [place]  Reason for request: Patient is currently taking this medication, patient received it from Treatment center in [place], [place], is listed as historic on the chart.  When did you use medication last?:  Today  Patient offered appointment:  Patient stated they discussed medication when was seen by Riri Ortiz on 03/12/2019  Okay to leave a detailed message: yes

## 2021-05-27 NOTE — TELEPHONE ENCOUNTER
Refill Request  Did you contact pharmacy: No  Medication name: , citalopram (CELEXA) 20 MG tablet  Requested Prescriptions      No prescriptions requested or ordered in this encounter     Who prescribed the medication: Shy Kamara MD  Pharmacy Name and Location: [facility] in [place], [place]  Is patient out of medication: No.  2-3 days left  Patient notified refills processed in 72 hours:  yes  Okay to leave a detailed message: yes    Patient was seen by Riri Ortiz on 03/12/2019 was said they were told during that visit to call [facility] to get a prescription refill for current medications. Patient states he is currently taking 40 MG

## 2021-05-27 NOTE — TELEPHONE ENCOUNTER
"Pt needs amlodipine refill electronically transmitted.  Upon review of med list, the action indicated was \"Phone In\" -> therefore amlodipine refill was never rec'd by pharmacy.  Simply needs electronic transmittal.   Done now.      "

## 2021-05-27 NOTE — TELEPHONE ENCOUNTER
FYI - Status Update  Who is Calling: Reji and Garima  Update: The phone number that was listed on the referral is not in service. Reji has been trying to contact the patient. They are closing the referral.   Okay to leave a detailed message?:  No return call needed

## 2021-05-28 ENCOUNTER — COMMUNICATION - HEALTHEAST (OUTPATIENT)
Dept: FAMILY MEDICINE | Facility: CLINIC | Age: 36
End: 2021-05-28

## 2021-05-28 DIAGNOSIS — I10 ESSENTIAL HYPERTENSION: ICD-10-CM

## 2021-05-28 RX ORDER — LISINOPRIL 20 MG/1
20 TABLET ORAL DAILY
Qty: 30 TABLET | Refills: 1 | Status: SHIPPED | OUTPATIENT
Start: 2021-05-28 | End: 2021-09-16 | Stop reason: DRUGHIGH

## 2021-05-29 ENCOUNTER — RECORDS - HEALTHEAST (OUTPATIENT)
Dept: ADMINISTRATIVE | Facility: CLINIC | Age: 36
End: 2021-05-29

## 2021-05-29 NOTE — TELEPHONE ENCOUNTER
RN cannot approve Refill Request    RN can NOT refill this medication medication not on med list. Last office visit: 12/7/2017 Shy Kamara MD Last Physical: Visit date not found Last MTM visit: Visit date not found Last visit same specialty: 12/7/2017 Shy Kamara MD.  Next visit within 3 mo: Visit date not found  Next physical within 3 mo: Visit date not found      Kasey Best, [facility] Triage/Med Refill 5/26/2019    Requested Prescriptions   Pending Prescriptions Disp Refills     eletriptan (RELPAX) 20 MG tablet [Pharmacy Med Name: ELETRIPTAN 20MG TABLETS] 30 tablet 0     Sig: TAKE 1 TABLET BY MOUTH AS NEEDED FOR MIGRAINE. MAY REPEAT IN 2 HOURS IF NECESSARY       Triptans Refill Protocol Passed - 5/25/2019  2:17 PM        Passed - PCP or prescribing provider visit in past 12 months       Last office visit with prescriber/PCP: 12/7/2017 Shy Kamara MD OR same dept: Visit date not found OR same specialty: 12/7/2017 Shy Kamara MD  Last physical: Visit date not found Last MTM visit: Visit date not found   Next visit within 3 mo: Visit date not found  Next physical within 3 mo: Visit date not found  Prescriber OR PCP: Shy Kamara MD  Last diagnosis associated with med order: There are no diagnoses linked to this encounter.  If protocol passes may refill for 12 months if within 3 months of last provider visit (or a total of 15 months).

## 2021-05-31 VITALS — BODY MASS INDEX: 41.23 KG/M2 | WEIGHT: 288 LBS | HEIGHT: 70 IN

## 2021-05-31 VITALS — HEIGHT: 70 IN | BODY MASS INDEX: 42.66 KG/M2 | WEIGHT: 298 LBS

## 2021-06-02 VITALS — BODY MASS INDEX: 44.19 KG/M2 | WEIGHT: 308 LBS

## 2021-06-03 VITALS
DIASTOLIC BLOOD PRESSURE: 84 MMHG | BODY MASS INDEX: 41.61 KG/M2 | OXYGEN SATURATION: 97 % | SYSTOLIC BLOOD PRESSURE: 148 MMHG | HEART RATE: 105 BPM | WEIGHT: 290 LBS

## 2021-06-03 NOTE — PROGRESS NOTES
Assessment/Plan:     Presents to clinic after being seen in emergency department for penile lesion.  It is essentially nontender to palpation, patient tested negative for HIV, gonorrhea, and chlamydia.  I did not see the patient had been screened for syphilis and so I ordered that today.  Given my presumptive diagnosis, we chose to treat empirically with doxycycline as patient is allergic to penicillin.  There is some concern the patient will not be able to for this medication, so my assistant is calling the pharmacy to ensure that this is affordable as patient absolutely needs to be treated.  If he cannot afford this medication we may need to recheck to the health department to ensure coverage.  Patient was also informed that if he does test positive he needs to alert all of his partners.  He became agitated and we discussed this.    Patient's blood pressure is also elevated.   I did reorder patient's amlodipine as it is unclear if he is taking 5 mg or 10 mg at this time.  I think patient needs to be on at least 10 mg.    He has not had a physical in several years.  Patient was agitated and inappropriate both to myself and to our lab department.  We chose not to address any other chronic health complaints today and to have patient return when he has insurance next year.     Problem List Items Addressed This Visit        ENT/CARD/PULM/ENDO Problems    Essential hypertension - Primary    Relevant Medications    amLODIPine (NORVASC) 10 MG tablet       Other    Anxiety      Other Visit Diagnoses     Penile lesion        Relevant Medications    doxycycline (MONODOX) 100 MG capsule    Other Relevant Orders    Syphilis Screen, Cascade          Return to clinic in 2 months when he has insurance.     Total time spent with patient was 15 minutes with greater than 50% spent in face-to-face counseling regarding the above plan.    Subjective:      Chu Pimentel is a 34 y.o. male who presents to clinic for penile  "concerns.    Patient endorses that he has only had one sexual partner, \"his old lady\".  It is unclear to me if they are currently in a relationship.  Patient was seen in the emergency department 2 days ago for swelling of the penis and a lesion.  He is concerned about the veins but had sexual transmitted infection testing performed.  Gonorrhea, chlamydia and HIV testing were negative.  It does not appear that they tested for syphilis.  Patient states that the lesion does not exactly hurt but it does feel swollen and it makes it uncomfortable for him to walk.  Patient is agitated during this visit, believed that I had \"hunted him down in order to have him return to clinic\", and currently does not have insurance.  Patient is worried about being able to afford his medications.      Past Medical History, Family History, and Social History reviewed.     Current Outpatient Medications on File Prior to Visit   Medication Sig Dispense Refill     citalopram (CELEXA) 40 MG tablet Take 1 tablet (40 mg total) by mouth daily. 90 tablet 3     eletriptan (RELPAX) 20 MG tablet TAKE 1 TABLET BY MOUTH AS NEEDED FOR MIGRAINE. MAY REPEAT IN 2 HOURS IF NECESSARY 30 tablet 0     gabapentin (NEURONTIN) 100 MG capsule Take 100 mg by mouth 3 (three) times a day. 90 capsule 1     ibuprofen (ADVIL,MOTRIN) 200 MG tablet Take 200-400 mg by mouth.       indomethacin (INDOCIN) 50 MG capsule Take 1 capsule (50 mg total) by mouth 3 (three) times a day as needed (pain). 21 capsule 0     traZODone (DESYREL) 100 MG tablet Take 100 mg by mouth at bedtime as needed.       [DISCONTINUED] amLODIPine (NORVASC) 10 MG tablet Take 1 tablet (10 mg total) by mouth daily. 90 tablet 3     No current facility-administered medications on file prior to visit.        Review of systems is as stated in HPI.  The remainder of the 10 system review is otherwise negative.    Objective:     /84   Pulse (!) 105   Wt (!) 290 lb (131.5 kg)   SpO2 97%   BMI 41.61 " kg/m   Body mass index is 41.61 kg/m .    Gen: Alert, NAD, appears stated age, normal hygiene   : Irregularly bordered subcentimeter white lesion that is not significantly tender to palpation appreciated on the left lateral aspect at the distal and of the shaft of patient's penis, abutting the glands; area of induration directly deep to the lesion, no fluctuance and no obvious purulence.  Psych: no apparent hallucinations or delusions, alert, oriented x3, speech is pressured, patient is borderline and appropriate, he appears slightly paranoid      This note has been dictated using voice recognition software. Any grammatical or context distortions are unintentional and inherent to the the software.     Shy Kamara MD

## 2021-06-03 NOTE — TELEPHONE ENCOUNTER
Call from man claiming to be pt       I am able to confirm ID with name and  provided      He was not able to provide any recent address or tele# or PCP name as a 3rd point of pt identification      Unfortunatly not able to assist as unable to confirm the caller's identity             Shantanu Burks RN   Triage and Medication Refills

## 2021-06-03 NOTE — TELEPHONE ENCOUNTER
"    Call from pt       CC: Issue with blood vessel (?) of penis - some swelling or scabbing there          > Was seen at ED ([facility]) 2 days ago for the same thing       > STD screening done there      > Other unspecified blood tests done     > Was told \"not a major issue\" and sent home with refill for amlodipine - not specifically told what was wrong though      > Still has some pain / swelling in that area     > No discharge     > No fever       A/P:   > OK for appt today -- did get appt with PCP for early afternoon today       Shantanu Burks, RN   Triage and Medication Refills            Reason for Disposition    Entire penis is swollen (i.e., edema)    Protocols used: PENIS AND SCROTUM SYMPTOMS-A-OH      "

## 2021-06-03 NOTE — TELEPHONE ENCOUNTER
Called patient to discuss the negative results. I have a strong suspicion that this is a false negative. Patient is already noticing improvement on only two days of medication. He is amenable to completing the regimen despite this result and does not desire a referral at this time. If he does not notice complete resolution, he will call back. I stressed the importance of taking the medication in its entirety.

## 2021-06-03 NOTE — TELEPHONE ENCOUNTER
The patient was going to bring a printed prescription to [facility] and use a [employer] coupon.     Reason contacted:  Medication question  Information relayed:  Called the patient and verified he was able to get this prescription filled at [facility] in [place].  The patient reports he picked this medication up and has been taking it for 2 days.    Called [facility] and notified Naveen that patient picked this prescription up at [facility].       Patient is requesting the results of the syphilis screen from 11/5/2019.  Spoke with Dr. Kamara who would recommend sending this message to Tonja MENON To contact patient with results.    Dr. Kamara recommends having patient continue doxycycline despite negative Syphilis results.   Additional questions:  Yes  Further follow-up needed:  Yes  Okay to leave a detailed message:  No

## 2021-06-03 NOTE — TELEPHONE ENCOUNTER
Medication Question or Clarification  Who is calling: [facility]acy[facility]  What medication are you calling about? (include dose and sig)   doxycycline (MONODOX) 100 MG capsule 28 capsule 0 11/5/2019     Sig - Route: Take 1 capsule (100 mg total) by mouth 2 (two) times a day. - Oral    Class: Print        Who prescribed the medication?: Dr Kamara  What is your question/concern?: Pharmacy states patient has not picked up this medication yet.  Ph[facility]acy[facility]  Okay to leave a detailed message?: Yes  Site CMT - Please call the pharmacy to obtain any additional needed information.

## 2021-06-04 VITALS
OXYGEN SATURATION: 97 % | BODY MASS INDEX: 42.5 KG/M2 | DIASTOLIC BLOOD PRESSURE: 88 MMHG | HEART RATE: 107 BPM | WEIGHT: 292 LBS | SYSTOLIC BLOOD PRESSURE: 130 MMHG

## 2021-06-04 VITALS — BODY MASS INDEX: 40.18 KG/M2 | WEIGHT: 280 LBS

## 2021-06-04 VITALS
HEART RATE: 74 BPM | BODY MASS INDEX: 43.52 KG/M2 | HEIGHT: 70 IN | SYSTOLIC BLOOD PRESSURE: 144 MMHG | DIASTOLIC BLOOD PRESSURE: 100 MMHG | WEIGHT: 304 LBS | OXYGEN SATURATION: 96 %

## 2021-06-04 NOTE — TELEPHONE ENCOUNTER
RN triage   Call from pt - difficult to hear pt  Pt states that he is concerned about high blood pressure and both hands are numb off and on and feet feel cold-- also concerns about chemical use   Pt states he cannot afford medications and has not taken any medications for 1 month  Does not check BP at home -- has not had a home until recently per pt   Denies chest pain -- no diff breathing   Sometimes slurred speech and blurred vision -- no blurred vision now -- pt now sure if speech is slurred now --   Triage nurse having difficulty hearing pt -- but thinks there may be some slurred speech  No headache  Pt states he feels unsteady -- and not safe to drive  Advised pt to go to ED now --   Pt states he does have someone who can take him --   Pt states he is not going to go to ED -- will go to [facility]   Haylie Du RN [facility] RN triage      Reason for Disposition    Systolic BP >= 160 OR Diastolic >= 100, and any cardiac or neurologic symptoms (e.g., chest pain, difficulty breathing, unsteady gait, blurred vision)    Protocols used: HIGH BLOOD PRESSURE-A-OH

## 2021-06-06 NOTE — TELEPHONE ENCOUNTER
FYI - Status Update  Who is Calling: Patient  Update: Caller stated that he is currently at the [facility] in [place] now for Detox. Caller stated that he wanted to let Shy Kamara MD know.   Address: [address] [place] [address]   Phone: [phone]    Okay to leave a detailed message?:  No return call needed

## 2021-06-07 NOTE — PROGRESS NOTES
"Chu Pimentel is a 34 y.o. male who is being evaluated via a billable telephone visit.      The patient has been notified of following:     \"This telephone visit will be conducted via a call between you and your physician/provider. We have found that certain health care needs can be provided without the need for a physical exam.  This service lets us provide the care you need with a short phone conversation.  If a prescription is necessary we can send it directly to your pharmacy.  If lab work is needed we can place an order for that and you can then stop by our lab to have the test done at a later time.    Telephone visits are billed at different rates depending on your insurance coverage. During this emergency period, for some insurers they may be billed the same as an in-person visit.  Please reach out to your insurance provider with any questions.    If during the course of the call the physician/provider feels a telephone visit is not appropriate, you will not be charged for this service.\"    Patient has given verbal consent to a Telephone visit? Yes    Patient would like to receive their AVS by AVS Preference: Mail a copy.  If necessary.    Additional provider notes: reestablish care     Patient recently moved back would like a refill on medication.    Patient checks his blood pressure approximately once weekly.  It is much better than it has been, he endorses \"outstanding\" values.  He states this is approximately 140s over 90s.  He is currently on amlodipine and lisinopril.    Patient also is a history of anxiety and depression.  He is currently on 150 mg Wellbutrin twice daily, although he was concerned that it might actually be that he was taking a total of 600 mg Wellbutrin.  He is also taking Celexa 40 mg, and he has been on this medication for many years.  He wonders if it has lost its efficacy.    Patient also feels very very tired.  He takes gabapentin, hydroxyzine, and trazodone at night.  He does " "not feel \"like himself\" until the late afternoon.  He also takes Adderall and finds that it is insufficient to make him feel focused.    She does have a history of ADHD.  He sees a psychiatrist but would like to change his care over to his primary doctor in order to have fewer office visits.  He currently takes 30 mg Adderall in the morning and 10 in the afternoon.  It is working fairly well aside from feeling tired.    Assessment/Plan:  1. Moderate episode of recurrent major depressive disorder (H)  2. Anxiety  Patient's depression and anxiety appears poorly controlled with his potentially excessively high Wellbutrin dosing and long-term history of citalopram.  With shared decision-making we chose to decrease the Wellbutrin to 300 mg once daily and this was refilled for the patient in a single tablet.  We switch Celexa directly to Prozac 40 mg.  No indication to taper.  I discontinued patient's hydroxyzine as he did not notice it improved his anxiety symptoms and appear to be making him feel more tired.  I also discontinued his gabapentin for the same reasons.    2. Essential hypertension  Patient blood pressure appears insufficiently controlled with the amlodipine and very low-dose lisinopril.  Will likely increase lisinopril to 5 mg versus 10 mg dose if patient's blood pressure is truly 140s over 90s.  Patient will keep a log of blood pressures for the next 2 weeks and report back.    3.  Insomnia  Recommended discontinuation of the hydroxyzine and continuation with the trazodone.  We will see how effective his sleep habits are in 2 weeks.      Phone call duration:  12 minutes    Shy Kamara MD      "

## 2021-06-07 NOTE — TELEPHONE ENCOUNTER
gabapentin (NEURONTIN) 100 MG capsule [[id number]]     Electronically signed by: Riri Ortiz CNP on 03/28/19 0823  Status: Discontinued    Ordering user: Riri Ortiz CNP 03/28/19 0823  Authorized by: Riri Ortiz CNP    Frequency: TID 03/28/19 - 04/27/20  Released by: Riri Ortiz CNP 03/28/19 0823    Discontinued by: Shy Kamara MD 04/27/20 1552 [Therapy completed]    Diagnoses

## 2021-06-08 NOTE — PROGRESS NOTES
"Chu Pimentel is a 34 y.o. male who is being evaluated via a billable telephone visit.      The patient has been notified of following:     \"This telephone visit will be conducted via a call between you and your physician/provider. We have found that certain health care needs can be provided without the need for a physical exam.  This service lets us provide the care you need with a short phone conversation.  If a prescription is necessary we can send it directly to your pharmacy.  If lab work is needed we can place an order for that and you can then stop by our lab to have the test done at a later time.    Telephone visits are billed at different rates depending on your insurance coverage. During this emergency period, for some insurers they may be billed the same as an in-person visit.  Please reach out to your insurance provider with any questions.    If during the course of the call the physician/provider feels a telephone visit is not appropriate, you will not be charged for this service.\"    Patient has given verbal consent to a Telephone visit? Yes    What phone number would you like to be contacted at?     Patient would like to receive their AVS by AVS Preference: Vern.    Additional provider notes: follow up    HTN:  - patient was instructed to monitor his blood pressure every day for 2 weeks  - he was unable to do this as he did not have access to a blood pressure cuff  - he is still taking the 10 mg doses of both the amlodipine and lisinopril    Mood:  - patient states he noticed no change in mood, he is doing well but he does feel sluggish  - with clarification it appears he is taking fluoxetine and wellbutrin    Tobacco abuse:  - previously he had used chantix and it worked well, he would like a refill to restart  - he wants to improve his lifestyle by working out and eating well too    Assessment/Plan:  1. Essential hypertension  Patient has been compliant with his medications but we are still " uncertain about patient's current blood pressure.  He states he will buy a blood pressure cuff today and take his blood pressure every day for 2 weeks and then we can discuss this at his next appointment.    2. Anxiety  3. Moderate episode of recurrent major depressive disorder (H)  Endorses feeling sluggish but attributes this to being overweight and having a bad lifestyle.  I suspect it is not due to medication adjustments.  Continue Wellbutrin and Prozac, consider psychology or psychiatry referral in the future for either therapy or medication management if this is insufficient.    4. Tobacco abuse  Reordered:  - varenicline (CHANTIX STARTING MONTH BOX) 0.5 mg (11)- 1 mg (42) tablet; 1 wk before you stop smoking take 0.5mg daily on days 1-3, 0.5mg 2 times each day on days 4-7, then 1mg 2 times daily.  Dispense: 53 tablet; Refill: 0  - varenicline (CHANTIX) 1 mg tablet; Take 1 tab by mouth two times a day. Take after eating with a full glass of water. NOTE: Dispense as maintenance for refills only.  Dispense: 60 tablet; Refill: 2    5. Attention deficit hyperactivity disorder (ADHD), combined type  Refilled, need records  - dextroamphetamine-amphetamine 30 mg Tab; Take 1 tablet by mouth daily.  Dispense: 30 tablet; Refill: 0  - dextroamphetamine-amphetamine (ADDERALL) 10 mg Tab tablet; TK 1 T PO AT NOON  Dispense: 30 tablet; Refill: 0        Phone call duration:  12 minutes    Shy Kamara MD

## 2021-06-08 NOTE — PROGRESS NOTES
"Chu Pimentel is a 34 y.o. male who is being evaluated via a billable telephone visit.      The patient has been notified of following:     \"This telephone visit will be conducted via a call between you and your physician/provider. We have found that certain health care needs can be provided without the need for a physical exam.  This service lets us provide the care you need with a short phone conversation.  If a prescription is necessary we can send it directly to your pharmacy.  If lab work is needed we can place an order for that and you can then stop by our lab to have the test done at a later time.    Telephone visits are billed at different rates depending on your insurance coverage. During this emergency period, for some insurers they may be billed the same as an in-person visit.  Please reach out to your insurance provider with any questions.    If during the course of the call the physician/provider feels a telephone visit is not appropriate, you will not be charged for this service.\"    Patient has given verbal consent to a Telephone visit? Yes    What phone number would you like to be contacted at?  [phone]    Patient would like to receive their AVS by AVS Preference: Vern.    Additional provider notes: follow up     Mood:  -At patient's last appointment we switch directly from high-dose Celexa to high-dose Prozac.  This was 2 weeks ago and patient indicated to my nurse that he is still not noticing improvement, but indicated to me that he was dramatically improved.  He stated that from a scale of 0-10 where 10 is the worst, he feels he is now at a 3 with respect to his mental health issues.  -We also adjusted patient's Wellbutrin.  It was unclear how much Wellbutrin patient was taking and I consolidated doses and change the amount such that he was at the maximum effective dose of 300 mg once daily.  He has not yet started this dose as he wanted to use of what he was taking at home.  He does not " "have access to the bottle and is uncertain about the dosing.    HTN:  -Respect to patient's hypertension, we discussed that the amlodipine was likely working well, but his last blood pressure reading was still elevated both systolic and diastolic.  Patient denies having a blood pressure cuff at home.  He was going to take his blood pressure every day for 2 weeks with his father's cuff, but found out later that his father did not have 1.  He states that he was \"too lazy to ride the city bus just to get his blood pressure checked\".    Insomnia:  Patient originally endorsed that the trazodone is working well but then described that he had stopped using it.  He states that he gets to sleep just fine now.  He does not like feeling hung over the next morning.    Patient did also stop the  hydroxyzine and gabapentin.      Assessment/Plan:  1. Essential hypertension  Although patient is unable to provide me with blood pressure readings, I suspect his blood pressure is still elevated.  He is on a very small dose of lisinopril, I suspect it is insufficient to treat any hypertension.  I strongly recommended continued use of the amlodipine and increasing lisinopril to 10 mg.  Patient will report back if he notices any lightheadedness or dizziness.  - lisinopriL (PRINIVIL,ZESTRIL) 10 MG tablet; Take 1 tablet (10 mg total) by mouth daily.  Dispense: 30 tablet; Refill: 11    2. Insomnia due to other mental disorder  Patient endorses that this is not a current concern.  Discontinue trazodone from patient's medication list.    3. Morbid obesity (H)  Patient indicated that he would be interested in a medication that helps him lose weight.  I described that I do not prescribe phentermine but that a referral to weight loss clinic will be possible when restrictions ease up during the pandemic.    4. Moderate episode of recurrent major depressive disorder (H)  5. Anxiety  Strongly encourage patient to wait judgment for the Wellbutrin and " the Prozac until the Prozac has reached a steady state.  Strongly encourage patient to switch to the dosing of Wellbutrin that I had prescribed to ensure that we are seeing  good compliance.      Offered a follow-up appointment 1 month the patient would like to be seen again in 2 weeks.    Phone call duration:  12 minutes    Shy Kamara MD

## 2021-06-08 NOTE — PROGRESS NOTES
"Chu Pimentel is a 34 y.o. male who is being evaluated via a billable telephone visit.      The patient has been notified of following:     \"This telephone visit will be conducted via a call between you and your physician/provider. We have found that certain health care needs can be provided without the need for a physical exam.  This service lets us provide the care you need with a short phone conversation.  If a prescription is necessary we can send it directly to your pharmacy.  If lab work is needed we can place an order for that and you can then stop by our lab to have the test done at a later time.    Telephone visits are billed at different rates depending on your insurance coverage. During this emergency period, for some insurers they may be billed the same as an in-person visit.  Please reach out to your insurance provider with any questions.    If during the course of the call the physician/provider feels a telephone visit is not appropriate, you will not be charged for this service.\"    Patient has given verbal consent to a Telephone visit? Yes    What phone number would you like to be contacted at?   [phone]  Patient would like to receive their AVS by AVS Preference: Vern.    Additional provider notes: see below      Assessment/Plan:     Patient was appropriate and easy to direct at this appointment, but had not completed almost any of the tasks that we had assigned at last appointment.    1. Essential hypertension  Continue current medication regimen, patient will get his blood pressure checked today and will order a blood pressure cuff and his size.  He will then check his blood pressure once daily for 2 weeks.    2. Anxiety  3. Moderate episode of recurrent major depressive disorder (H)  I would prefer to have a specialist weigh in on options for this patient.  Patient was originally very reluctant to see a psychiatrist as he has been to them before.  However, given that his medication is not " working and he is at the maximal dose we felt it was appropriate.  - Ambulatory referral to Psychiatry    4. Tobacco abuse  Patient has still not picked up the Chantix, so he is still using tobacco products.    5. Obesity due to excess calories with serious comorbidity, unspecified classification  Patient would like a weight loss pill.  - Ambulatory referral to Bariatric Care: Surgical and Non-Surgical      Discontinued Medications:  There are no discontinued medications.    Return to clinic in 2 weeks.    This note has been dictated using voice recognition software. Any grammatical or context distortions are unintentional and inherent to the the software.     Shy Kamara MD  Family [facility]      Subjective:      Chu Pimentel is a 34 y.o. male who presents to clinic for serveral concerns:    HTN:  - patient was told to monitor blood pressure every day for two weeks  - he still has not obtained a blood pressure cuff  - he will go to the fire station today and get his blood pressure checked  - he is currently on amlodipine and lisinopril    Tobacco:  - patient was prescribed a refill of chantix but patient did not pick it up    Mood:   - he was on wellbutrin and prozac at the last visit  - he complains of fatigue  - he abritrarily states his anxiety is a 3/10 and his depression is an 8/10; denies SI/HI  - he is not excited but would be willing to see a psychiatrist    Socially, patient is homeless and living with his father. He feels badly about this. He is gambling.     Old records reviewed:   Previous note    Past Medical History, Family History, and Social History reviewed.   Social History     Tobacco Use   Smoking Status Current Every Day Smoker     Packs/day: 0.50   Smokeless Tobacco Never Used       Review of systems is as stated in HPI.  Patient endorses: nothing relevant       Objective:   Unable to obtain significant objective data due to virtual visit status.     Phone call duration:   19 minutes    Shy Kamara MD

## 2021-06-09 NOTE — TELEPHONE ENCOUNTER
reviewed - last fill 5/26 - ok for refill, I will do in PCP's absence    Erin Painter MD  [facility]

## 2021-06-09 NOTE — PROGRESS NOTES
"Chu Pimentel is a 34 y.o. male who is being evaluated via a billable video visit.      The patient has been notified of following:     \"This video visit will be conducted via a call between you and your physician/provider. We have found that certain health care needs can be provided without the need for an in-person physical exam.  This service lets us provide the care you need with a video conversation.  If a prescription is necessary we can send it directly to your pharmacy.  If lab work is needed we can place an order for that and you can then stop by our lab to have the test done at a later time.    Video visits are billed at different rates depending on your insurance coverage. Please reach out to your insurance provider with any questions.    If during the course of the call the physician/provider feels a video visit is not appropriate, you will not be charged for this service.\"    Patient has given verbal consent to a Video visit? Yes  How would you like to obtain your AVS? AVS Preference: MyChart.  If dropped by the video visit, the video invitation should be sent to: Text to cell phone: [phone]   Will anyone else be joining your video visit? No        Video Start Time: 4:06 PM  Video-Visit Details    Type of service:  Video Visit    Video End Time (time video stopped): 4:46 PM  Originating Location (pt. Location): Home    Distant Location (provider location):  [facility] FAMILY MEDICINE/OB     Platform used for Video Visit: Wortal/ antwoniSTAR Medicalarcelia Moseley      ASSESSMENT AND PLAN:     We spent 40 minutes today in direct patient contact via video conference,w 100% of the time in consultation concerning medical problems as listed below. Phone visit done due to covid 19 crisis.     Niharika Moseley MD, Family Medicine and Diplomate of the American Board of Obesity Medicine 2020         New Medical Weight Management Consult    PATIENT:  Chu Pimentel  MRN:         [id number]  :        "  1985  CHERIE:         7/15/2020    Dear Dr. Kamara,     I had the pleasure of seeing your patient, Chu Pimentel.  Full intake/assessment was done to determine barriers to weight loss success and develop a treatment plan. Chu Pimentel is a 34 y.o. male interested in treatment of medical problems associated with weight.     He says a friend of his is taking something to lose weight.  His friend is taking phentermine. He wants to take phentermine. However, that would be contraindicated with his adderall.    Worried about staying healthy. Wants to lose weight so that he wont have a heart attack and he wants to stay alive for his kids.     Vitals:    07/15/20 1600   Weight: (!) 280 lb (127 kg)     Body mass index is 40.18 kg/m .     ASSESSMENT AND PLAN:      Problem List Items Addressed This Visit        Unprioritized    Essential hypertension     Weight reduction is recommended. See bmi in the problem list.          Class 3 severe obesity due to excess calories with serious comorbidity and body mass index (BMI) of 40.0 to 44.9 in adult (H)     ASSESSMENT/ PLAN  PATIENT IS BEGINNING ACTIVE MEDICALLY SUPERVISED WEIGHT LOSS STARTING AT Body mass index is 40.18 kg/m .     Patient declined 24 week weight loss program due to cost  Patient declined 3 pack health coaching due to cost.   We did not discuss food supplements.     We discussed surgical weight loss options given his BMI is 43 and he  has comorbid conditions: htn    Medications started today: none, wants to see if insurance will cover saxenda.     Plan dietician visit asap.   meal replace sent link. May be beneifical as he says he eats a lot of convenience foods (was eating chips during out video visit)    Complete intake questionnaire and schedule follow up to discuss the answers.     Lab assessment plan:   Orders Placed This Encounter   Procedures     Comprehensive Metabolic Panel     Glycosylated Hemoglobin A1c     Lipid Cascade     Vitamin D, Total  (25-Hydroxy)     Thyroid Cascade          Follow up in 1 month with me and dietician asap.     DISCUSSION _________________________________________________________________    Dx:  Initial bmi is Body mass index is 40.18 kg/m .  With the following weight related comorbid medical conditions: htn    BECAUSE OF ABOVE PROBLEMS IT IS STRONGLY ADVISED THAT Chu LOSE WEIGHT.  BELOW IS MY PLAN FOR him     Shy Kamara MD  is his primary care provider - who referred him here today for help with weight loss.    Start weight 297 lbs, Body mass index is 40.18 kg/m .  7/16/2020     Medication notes:  Medications he takes, that are known to increase weight:prozac  Medications he takes, known to decrease weight: adderall, wellbutrin.   Medications he takes whose dose may be affected by weight changes:  Lisinopril, amlodipine, prozac    NALTREXONE  7/16/2020 START    WELLBUTRIN 300MG 24 HR TAB   rx by pcp taking as of intake 7/16/2020     ADDERALL  rx by another provider taking as of intake 7/16/2020     PHENTERMINE   Contraindicated due to concurrent use adderall and hx meth abuse (in remission)    DAVIN  Will ask if covered by insurance    [facility]   Could consider    Potential barriers to weight loss identified thus far:   -unemployed  -single parent, lives with friend, not much support  -overweight since childhood.  -hx of mental health and chem dep issues  -lack of exercise (too tired)  -eating too much  -night eating syndrome  -eats lots of simple carbs and sugar drinks  -compulsive and emotional eating  -craves fast food, sweets, candy, chocolate, chips, crackers, cheese  -endorses snoring, witnessed apnea, and being tired during the day - may benefit from sleep consult      Strengths that may help weight loss:  -wants to live for his kids, doesn't want to have a heart attack.  -does his own food shopping  -drinks water  -likes veggies         Relevant Medications    naltrexone (DEPADE) 50 mg tablet      Other  "Visit Diagnoses     Screening for metabolic disorder    -  Primary    Relevant Orders    Comprehensive Metabolic Panel    Screening for diabetes mellitus        Relevant Orders    Glycosylated Hemoglobin A1c    Screening, lipid        Relevant Orders    Lipid Cascade    Screening for thyroid disorder        Relevant Orders    Thyroid [place]    Screening for endocrine, nutritional, metabolic and immunity disorder        Relevant Orders    Vitamin D, Total (25-Hydroxy)    Screening, anemia, deficiency, iron        Relevant Orders    HM1(CBC and Differential)             He has the following co-morbidities:    [facility] CO-MORBIDITIES 7/16/2020   How has your weight changed over the last year?  Gained   How many pounds? 20   I have the following health issues associated with obesity: High Blood Pressure   I have the following symptoms associated with obesity: Depression, Back Pain       Patient goals reviewed with patient 7/16/2020   I am interested in having a healthier weight to diminish current health problems: Yes   I am interested in having a healthier weight in order to prevent future health problems: Yes   I am interested in having a healthier weight in order to have a future surgery: No   I have the following family history of obesity/being overweight:  My mother is overweight, My father is overweight, One or more of my siblings are overweight   I have the following family history of obesity/being overweight:  My mother is overweight, My father is overweight, One or more of my siblings are overweight       Referring Provider 7/16/2020   Please name the provider who referred you to Medical Weight Management.  If you do not know, please answer: \"I Don't Know\".         Weight History Reviewed With Patient 7/16/2020   How concerned are you about your weight? Very Concerned   Would you describe your weight gain as gradual? No   I became overweight: As a Child   The following factors have contributed to " my weight gain:  Mental Health Issues, Eating Wrong Types of Food, Eating Too Much, Lack of Exercise, Stress   My lowest weight since age 20 was: 275   My highest weight since age 20 was: 307   The most weight I have ever lost was: (lbs) 15       Diet Recall Reviewed With Patient 7/16/2020   Do you typically eat breakfast? No   Do you typically eat lunch? No   Do you typically eat supper? Yes   If you do eat supper, what types of food do you typically eat? Fast food   Do you typically eat snacks? Yes   If you do snack, what types of food do you typically eat? Chips,pop,cheese brats,candy   Do you like vegetables?  Yes   Do you drink water?  Yes   How many glasses of juice do you drink in a typical day? 2   How many of glasses of milk do you drink in a typical day? 0   If you do drink milk, what type? 2%   How many 8oz glasses of sugar containing drinks such as Wellington-Aid/sweet tea do you drink in a day? 5   How many cans/bottles of sugar pop/soda/tea/sports drinks do you drink in a day? 5   How many cans/bottles of sugar pop/soda/tea/sports drinks do you drink in a day? 4   How often do you have a drink of alcohol? Montly or Less   If you do drink, how many drinks might you have in a day? 3-4       Eating Habits Reviewed With Patient 7/16/2020   Eats Starches Almost Everyday   Generally, my meals include foods like these: fried meats, brats, burgers, french fries, pizza, cheese, chips, or ice cream. Almost Everyday   Eat fast food (like McDonalds, Burger Juno, Taco Bell). A Few Times a Week   Buffet Once a Week   Watches TV Less Than Weekly   Often skip meals, eat at random times, have no regular eating times. Everyday   Grazes Almost Everyday   Eat extra snacks between meals. Almost Everyday   Eat most of my food at the end of the day. A Few Times a Week   Eats at Night Once a Week   Eat extra snacks to prevent or correct low blood sugar. Never   Eat to prevent acid reflux or stomach pain. Never   Worry about not  having enough food to eat. Never   Have you been to the food shelf at least a few times this year? No   I eat when I am depressed. Never   I eat when I am stressed. Never   I eat when I am bored. Once a Week   I eat when I am anxious. Never   I eat when I am happy or as a reward. A Few Times a Week   I feel hungry all the time even if I just have eaten. Once a Week   Feeling full is important to me. A Few Times a Week   I finish all the food on my plate even if I am already full. Almost Everyday   I can't resist eating delicious food or walk past the good food/smell. Almost Everyday   I eat/snack without noticing that I am eating. Almost Everyday   I eat when I am preparing the meal. Almost Everyday   I eat more than usual when I see others eating. A Few Times a Week   I have trouble not eating sweets, ice cream, cookies, or chips if they are around the house. Never   I think about food all day. Less Than Weekly   What foods, if any, do you crave? Sweets / Candy / Chocolate, Chips / Crackers, Cheese   Please list any other foods you crave. Fast food       Activity/Exercise History Reviewed With Patient 7/16/2020   How much of a typical 12 hour day do you spend sitting? Half the Day   How much of a typical 12 hour day do you spend lying down? Less Than Half the Day   How much of a typical day do you spend walking/standing? Half the Day   How many hours (not including work) do you spend on the TV/Video Games/Computer/Tablet/Phone? 1 Hour or Less   How many times a week are you active for the purpose of exercise? Never   How many total minutes do you spend doing some activity for the purpose of exercising when you exercise? None   What keeps you from being more active? Too Tired       PAST MEDICAL HISTORY:  Past Medical History:   Diagnosis Date     Anxiety      Depression      Hypertension      Obesity        Work/Social History Reviewed With Patient 7/16/2020   My employment status is: Unemployed   What is your  marital status? Single   If in a relationship, is your significant other overweight? N/A   Do you have children? Yes   If you have children, are they overweight? Yes   Who do you live with?  Friend   Are they supportive of your health goals? No   Who does the food shopping?  Myself       Mental Health History Reviewed With Patient 7/16/2020   Have you ever been physically or sexually abused? No   If yes, do you feel that the abuse is affecting your weight? No   If yes, would you like to talk to a counselor about the abuse? No   How often in the past 2 weeks have you felt little interest or pleasure in doing things? For Several Days   Over the past 2 weeks how often have you felt down, depressed, or hopeless? For Several Days       Sleep History Reviewed With Patient 7/16/2020   How many hours do you sleep at night? 12   Do you think that you snore loudly or has anybody ever heard you snore loudly (louder than talking or so loud it can be heard behind a shut door)? Yes   Has anyone seen or heard you stop breathing during your sleep? Yes   Do you often feel tired, fatigued, or sleepy during the day? Yes   Do you have a TV/Computer or other screens in your bedroom? No       MEDICATIONS:   Current Outpatient Medications   Medication Sig Dispense Refill     amLODIPine (NORVASC) 10 MG tablet TAKE 1 TABLET BY MOUTH DAILY 15 tablet 0     buPROPion (WELLBUTRIN XL) 300 MG 24 hr tablet Take 1 tablet (300 mg total) by mouth daily. 90 tablet 3     dextroamphetamine-amphetamine (ADDERALL) 10 mg Tab tablet TK 1 T PO AT NOON 30 tablet 0     dextroamphetamine-amphetamine 30 mg Tab Take 1 tablet by mouth daily. 30 tablet 0     FLUoxetine (PROZAC) 40 MG capsule Take 1 capsule (40 mg total) by mouth daily. 90 capsule 2     ibuprofen (ADVIL,MOTRIN) 200 MG tablet Take 200-400 mg by mouth.       lisinopriL (PRINIVIL,ZESTRIL) 10 MG tablet Take 2 tablets (20 mg total) by mouth daily. 30 tablet 11     varenicline (CHANTIX STARTING MONTH BOX)  0.5 mg (11)- 1 mg (42) tablet 1 wk before you stop smoking take 0.5mg daily on days 1-3, 0.5mg 2 times each day on days 4-7, then 1mg 2 times daily. 53 tablet 0     varenicline (CHANTIX) 1 mg tablet Take 1 tab by mouth two times a day. Take after eating with a full glass of water. NOTE: Dispense as maintenance for refills only. 60 tablet 2     naltrexone (DEPADE) 50 mg tablet Take 0.5 tablets (25 mg total) by mouth daily. 15 tablet 0     No current facility-administered medications for this visit.        ALLERGIES:   Allergies   Allergen Reactions     Penicillins Unknown       PHYSICAL EXAM:  Physical Exam  Constitutional:       General: He is not in acute distress.     Appearance: He is well-developed.   HENT:      Head: Normocephalic and atraumatic.   Eyes:      Conjunctiva/sclera: Conjunctivae normal.   Neck:      Musculoskeletal: Neck supple.   Pulmonary:      Effort: Pulmonary effort is normal.   Musculoskeletal: Normal range of motion.   Neurological:      Mental Status: He is alert and oriented to person, place, and time.          Sincerely,    Niharika Moseley MD

## 2021-06-09 NOTE — PATIENT INSTRUCTIONS - HE
Return in about 1 month (around 8/15/2020) for weight loss Follow up in 1 month with me and dietician asap. .

## 2021-06-09 NOTE — PROGRESS NOTES
"Chu Pimentel is a 34 y.o. male who is being evaluated via a billable telephone visit.      The patient has been notified of following:     \"This telephone visit will be conducted via a call between you and your physician/provider. We have found that certain health care needs can be provided without the need for a physical exam.  This service lets us provide the care you need with a short phone conversation.  If a prescription is necessary we can send it directly to your pharmacy.  If lab work is needed we can place an order for that and you can then stop by our lab to have the test done at a later time.    Telephone visits are billed at different rates depending on your insurance coverage. During this emergency period, for some insurers they may be billed the same as an in-person visit.  Please reach out to your insurance provider with any questions.    If during the course of the call the physician/provider feels a telephone visit is not appropriate, you will not be charged for this service.\"    Patient has given verbal consent to a Telephone visit? Yes    What phone number would you like to be contacted at? [phone]     Patient would like to receive their AVS by AVS Preference: Vern.    Additional provider notes: adhd     Breakfast time he takes a 10 mg tab  11-12 PM he takes a 30 mg tab  He feels that he can't focus and a lack of motivation for a while. It is worse in the evening. He is worried this will affect his work when he begins working again. He feels sluggish. This has been going on for about 1 month.    He has not been taking the wellbutrin for about a week.     Patient discontinued the Wellbutrin as he lost the prescription.  He has been off of it now for a week.  He has not noticed any improvement.  He is now taking a weight loss supplement.  He does have a history of methamphetamine abuse.    Patient and I had a very involved in extensive discussion about which doses of Adderall would be " appropriate.  He would like to increase to a dose of 20 mg in the morning and 30 mg in the afternoon, but I became concerned about his insistence.    Assessment/Plan: Patient has a history of methamphetamine abuse that is currently in remission.  It is slightly disconcerting to have a patient aggressively insist on increasing doses of daily split medication with that history.  Quantitative urine metabolites ordered for when patient presents to clinic for routine lab work.  With significant explanation, patient was finally amenable to a 1 month trial of extended release 30 mg Adderall.  He will report back in 1 week if it is not working.  I strongly suspect the patient will report back that it is insufficient to meet his needs and will again require split dosing.  We will await results of the urine and could consider referral.  1. Attention deficit hyperactivity disorder (ADHD), predominantly inattentive type  3. Methamphetamine abuse in remission (H)  - dextroamphetamine-amphetamine (ADDERALL XR) 30 MG 24 hr capsule; Take 1 capsule (30 mg total) by mouth daily.  Dispense: 30 capsule; Refill: 0  - Amphetamines, Urine, Quantitative; Future    2. Moderate episode of recurrent major depressive disorder (H)  Discontinue Wellbutrin per patient preference, continue fluoxetine and naltrexone          Phone call duration:  15 minutes    Shy Kamara MD

## 2021-06-09 NOTE — PROGRESS NOTES
Hello,    Please see message from Dr. Kamara and reach out to patient again to assist with scheduling.     Thank you!

## 2021-06-09 NOTE — PROGRESS NOTES
"Chu Pimentel is a 34 y.o. male who is being evaluated via a billable telephone visit.      The patient has been notified of following:     \"This telephone visit will be conducted via a call between you and your physician/provider. We have found that certain health care needs can be provided without the need for a physical exam.  This service lets us provide the care you need with a short phone conversation.  If a prescription is necessary we can send it directly to your pharmacy.  If lab work is needed we can place an order for that and you can then stop by our lab to have the test done at a later time.    Telephone visits are billed at different rates depending on your insurance coverage. During this emergency period, for some insurers they may be billed the same as an in-person visit.  Please reach out to your insurance provider with any questions.    If during the course of the call the physician/provider feels a telephone visit is not appropriate, you will not be charged for this service.\"    Patient has given verbal consent to a Telephone visit? Yes    What phone number would you like to be contacted at? [phone]     Patient would like to receive their AVS by AVS Preference: Vern.    Additional provider notes:     Assessment/Plan:     Patient presents for follow-up but has been unable to complete almost any of the tasks set forward at our last appointment.    1. Essential hypertension  - continue amlodipine 10 mg, increase lisinopril to 20 mg  - follow-up with me in clinic in 1 week    2. Moderate episode of recurrent major depressive disorder (H)  - encouraged patient to follow-up with psychiatry and consider a virtual visit    3. Morbid obesity (H)  -We will have the weight loss clinic reach out to patient to schedule an appointment    4. Tobacco abuse  -Reiterated the importance of restarting Chantix if patient is interested in stopping smoking.      Discontinued Medications:  Medications " Discontinued During This Encounter   Medication Reason     lisinopriL (PRINIVIL,ZESTRIL) 10 MG tablet        Return to clinic in 1 weeks.    This note has been dictated using voice recognition software. Any grammatical or context distortions are unintentional and inherent to the the software.     Shy Kamara MD  Family Medicine [facility]      Subjective:      Chu Pimentel is a 34 y.o. male who presents to clinic for follow up.    HTN:  - patient has been taking the lisinopril 10 mg, amlodipine 10 mg  -Patient has only been able to check his blood pressure once and it was still elevated.    - He has no ability to obtain a blood pressure cuff for home blood pressure readings.    Anxiety/depression:  -Patient was contacted by the psychiatrist but was unable to set up an appointment secondary to transportation concerns.  He did not ask if it could be a virtual visit.    Obesity:  -Patient does not believe he was contacted by the weight loss clinic.  He is interested in taking medication to lose weight.  He finds it hard to manage good eating habits as he is currently homeless and does not have access to his own fridge.    Tobacco Abuse:  -Patient did take the Chantix for 4 days, managed to stop smoking for 1 day but then restarted.  He then stopped the Chantix.  He is still interested in stopping smoking.    Old records reviewed:   - last note    Past Medical History, Family History, and Social History reviewed.   Social History     Tobacco Use   Smoking Status Current Every Day Smoker     Packs/day: 0.50   Smokeless Tobacco Never Used       Review of systems is as stated in HPI.    Objective:   Unable to obtain significant objective data due to virtual visit status.         Phone call duration:  9 minutes    Shy Kamara MD

## 2021-06-10 NOTE — PROGRESS NOTES
Spoke w/Chu, went over recommendation again from Dr Shy Kamara for Chu to see [facility] for psychiatry care.  Relayed Dr Kamaras mess as well regarding refusal to fill his meds if he is not seen

## 2021-06-10 NOTE — TELEPHONE ENCOUNTER
Who is calling:  Patient  Reason for Call:  Pt calling with a week check in.  Pt states he does not like the medication change of Adderall 30 mg XR, and wants a prescription sent back in for Adderall 30/10mg instant release.  Please advise.  Date of last appointment with primary care: N/A  Okay to leave a detailed message: Yes

## 2021-06-10 NOTE — PROGRESS NOTES
________________________________________  Medications Phoned  to Pharmacy [] yes [x]no  Name of Pharmacist:  List Medications, including dose, quantity and instructions    Medications ordered this visit were e-scribed.  Verified by order class [x] yes  [] no  Prozac 60 mg/day  Medication changes or discontinuations were communicated to patient's pharmacy: [x] yes  [] no  Called [facility] in [place] ( [phone]) and d/c'd refills for Prozac 40 mg due to dose change  Spoke to pt and updated that he is not due for Adderall refill until 9/12. Advised the pt to contact us ~ 5 days before he will run out of med to request refill.   Dictation completed at time of chart check: [] yes  [x] no    I have checked the documentation for today s encounters and the above information has been reviewed and completed.

## 2021-06-10 NOTE — PROGRESS NOTES
Assessment and Plan:   Patient presents to clinic for routine physical.  Patient became very suspicious when I asked if he would like to be screened for sexually transmitted infections.  Although he originally endorsed wanting to be screened for everything, I was then approached by lab after our visit when patient describes never having discussed this with me.  He then stated he wanted all of them except for syphilis.  When I discussed that it was standard to screen for all of the sexual transmitted infections patient eventually agreed.  However, then patient refused to leave a urine after I had discussed potentially drug testing for the patient, he did not get tested for gonorrhea and chlamydia.    1. Essential hypertension  Patient's blood pressure is still significantly uncontrolled.  However, he is also been noncompliant with his medication because he stated he lost them.  He is getting refills today, but it is difficult to assess whether the medication was adequate as he does not have a home sphygmomanometer and has been off the meds for the past 2 days.  It did improve after the completion of the visit, but I will have patient return in 1 week in order to assess efficacy of medication.  If patient's blood pressure is still elevated, would have a very low threshold to crease the lisinopril.    2. Attention deficit hyperactivity disorder (ADHD), predominantly inattentive type  -Patient is currently out of the medication I had prescribed, originally ordered on July 16.  This is almost a full week early and patient stated that he would infrequently take 2 tablets when he had a tough day which is not how it was prescribed; so he is now out   -I discussed drug testing the patient today to ensure that he had some adderall in his system to help in determination of diversion of the medication but patient refused all urine testing   -Given that patient and I have been unable to determine a dose that is effective,  patient has a history of methamphetamine abuse, patient is exceptionally resistant to medication changes, he overused his last prescription by taking the medication inappropriately, I am refusing to refill this medication aside from 1 additional refill to tide him over before he can see psychiatry (referral placed previously).   - will refill 30 tablets of 30 mg immediate release and 30 tablets of 10 mg immediate release one time on 8/16 and then no more adderall from this provider       USPSTF Recommendations for age 34:  Patient has been counseled on/screened for:  - intimate partner violence and there are no concerns at this time  - a healthful diet and physical activity for CVD disease prevention; based on BMI lipid and DM II screening was performed.   Sexually transmitted infections: Patient would like to be screened for chlamydia, gonorrhea, syphilis, HIV, and hepatitis  Immunizations: declined    The patient's current medical problems were reviewed.    I have had an Advance Directives discussion with the patient.  The following health maintenance schedule was reviewed with the patient and provided in printed form in the after visit summary:   Health Maintenance   Topic Date Due     DEPRESSION ACTION PLAN  1987     HIV SCREENING  10/28/2002     TD 18+ HE  10/28/2005     TDAP ADULT ONE TIME DOSE  10/28/2005     PNEUMOCOCCAL IMMUNIZATION 19-64 MEDIUM RISK (1 of 1 - PPSV23) 10/28/2006     INFLUENZA VACCINE RULE BASED (1) 08/01/2020     MEDICARE ANNUAL WELLNESS VISIT  08/07/2021     ADVANCE CARE PLANNING  10/23/2022     HEPATITIS B VACCINES  Aged Out        Subjective:   Chief Complaint: Chu Pimentel is an 34 y.o. male here for an Annual Wellness visit.   HPI:      HTN:  - blood pressure medication last taken two days ago    Left buttock pain:  - duration many months  - ibuprofen isn't helping  - it does radiate down his leg sometimes    Review of Systems:  Joint pain, back pain, memory loss.  Please see  above.  The rest of the review of systems are negative for all systems.    Patient Care Team:  Shy Kamara MD as PCP - General (Family Medicine)  Shy Kamara MD as Assigned PCP     Patient Active Problem List   Diagnosis     Essential hypertension     Anxiety     Recurrent major depressive disorder (H)     Migraine without aura     Tobacco abuse     Class 3 severe obesity due to excess calories with serious comorbidity and body mass index (BMI) of 40.0 to 44.9 in adult (H)     Insomnia due to other mental disorder     Attention deficit hyperactivity disorder (ADHD), predominantly inattentive type     Past Medical History:   Diagnosis Date     Anxiety      Depression      Hypertension      Obesity       Past Surgical History:   Procedure Laterality Date     CHOLECYSTECTOMY  2012      Family History   Problem Relation Age of Onset     Hypertension Mother      Hypertension Father       Social History     Socioeconomic History     Marital status: Single     Spouse name: Not on file     Number of children: Not on file     Years of education: Not on file     Highest education level: Not on file   Occupational History     Not on file   Social Needs     Financial resource strain: Not on file     Food insecurity     Worry: Not on file     Inability: Not on file     Transportation needs     Medical: Not on file     Non-medical: Not on file   Tobacco Use     Smoking status: Current Every Day Smoker     Packs/day: 0.50     Smokeless tobacco: Never Used   Substance and Sexual Activity     Alcohol use: Yes     Comment: four-five/month     Drug use: Not Currently     Types: Methamphetamines     Comment: hx of meth use      Sexual activity: Not on file     Comment: single    Lifestyle     Physical activity     Days per week: Not on file     Minutes per session: Not on file     Stress: Not on file   Relationships     Social connections     Talks on phone: Not on file     Gets together: Not on file     Attends  "Sabianism service: Not on file     Active member of club or organization: Not on file     Attends meetings of clubs or organizations: Not on file     Relationship status: Not on file     Intimate partner violence     Fear of current or ex partner: Not on file     Emotionally abused: Not on file     Physically abused: Not on file     Forced sexual activity: Not on file   Other Topics Concern     Not on file   Social History Narrative    7/15/2020       Current Outpatient Medications   Medication Sig Dispense Refill     amLODIPine (NORVASC) 10 MG tablet Take 1 tablet (10 mg total) by mouth daily. 30 tablet 0     dextroamphetamine-amphetamine (ADDERALL XR) 30 MG 24 hr capsule Take 1 capsule (30 mg total) by mouth daily. 30 capsule 0     FLUoxetine (PROZAC) 40 MG capsule Take 1 capsule (40 mg total) by mouth daily. 90 capsule 2     ibuprofen (ADVIL,MOTRIN) 200 MG tablet Take 200-400 mg by mouth.       lisinopriL (PRINIVIL,ZESTRIL) 10 MG tablet Take 2 tablets (20 mg total) by mouth daily. 30 tablet 0     buPROPion (WELLBUTRIN XL) 300 MG 24 hr tablet        No current facility-administered medications for this visit.       Objective:   Vital Signs:   Visit Vitals  BP (!) 144/100   Pulse 74   Ht 5' 9.5\" (1.765 m)   Wt (!) 304 lb (137.9 kg)   SpO2 96%   BMI 44.25 kg/m         VisionScreening:  No exam data present     PHYSICAL EXAM  Gen: Alert, NAD, appears stated age, normal hygiene   Eyes: conjunctivae without injection, sclera clear, EOMI  ENT/mouth: nares clear, septum midline, absent rhinorrhea, throat without injection, neck is supple, no thyroid enlargement  CV: RRR, no murmur appreciated, pedal edema absent bilaterally  Resp: CTAB, no wheezes, rales or ronchi eventually but patient did have a left upper lung wheeze which was repeated through several breaths but then resolved  ABD: normoactive, non-tender to palpation, nondistended  MSK: grossly full range of motion in all joints, no obvious deformity  Neuro: CN " II-XII grossly intact, no deficits in coordination  Psych: no apparent hallucinations or delusions, no pressured speech; alert, oriented x3  SKIN: dry and without lesions  Heme/lymph: no pallor, no active bleeding/bruising, no adenopathy appreciated

## 2021-06-10 NOTE — PROGRESS NOTES
Assessment/Plan:     Patient resents to clinic for follow-up.    1. Essential hypertension  Refilled 20 mg lisinopril as this dose seems to be appropriate, continue amlodipine  - lisinopriL (PRINIVIL,ZESTRIL) 20 MG tablet; Take 1 tablet (20 mg total) by mouth daily.  Dispense: 90 tablet; Refill: 3    2. Attention deficit hyperactivity disorder (ADHD), predominantly inattentive type  Final refill for ADHD dosing per patient preference:  - dextroamphetamine-amphetamine (ADDERALL) 30 mg Tab; Take 30 mg by mouth daily.  Dispense: 30 tablet; Refill: 0  - dextroamphetamine-amphetamine (ADDERALL) 10 mg Tab tablet; Take 1 tablet by mouth daily.  Dispense: 30 tablet; Refill: 0    3. Class 3 severe obesity due to excess calories with serious comorbidity and body mass index (BMI) of 40.0 to 44.9 in adult (H)  Patient is already losing weight, encouraged him in his efforts.      Return to clinic PRN.    Subjective:      Chu Pimentel is a 34 y.o. male who presents to clinic for BP follow up.     Patient has lost 12 pounds since I saw him last.  He is confused by this but states that he attributes it to drinking less sugar, portion control, slightly increased exercise, and better sleeping habits.    Patient's blood pressure is dramatically improved from last time, he attributes this to medication compliance.  He feels that he can follow-up intermittently by visiting the fire station or having his ex check his blood pressure.    Patient needs a refill of his Adderall and he is due to receive this, but he understands this will be the last refill that I make and he has no complaints.    Patient states that on a scale from 1-10, his depression is a 3.  This is very good for him.  He feels overall like his health is improving.      Past Medical History, Family History, and Social History reviewed.    Current Outpatient Medications on File Prior to Visit   Medication Sig Dispense Refill     amLODIPine (NORVASC) 10 MG tablet Take 1  tablet (10 mg total) by mouth daily. 30 tablet 0     buPROPion (WELLBUTRIN XL) 300 MG 24 hr tablet        FLUoxetine (PROZAC) 40 MG capsule Take 1 capsule (40 mg total) by mouth daily. 90 capsule 2     ibuprofen (ADVIL,MOTRIN) 200 MG tablet Take 200-400 mg by mouth.       [DISCONTINUED] dextroamphetamine-amphetamine (ADDERALL XR) 30 MG 24 hr capsule Take 1 capsule (30 mg total) by mouth daily. 30 capsule 0     [DISCONTINUED] lisinopriL (PRINIVIL,ZESTRIL) 10 MG tablet Take 2 tablets (20 mg total) by mouth daily. 30 tablet 0     No current facility-administered medications on file prior to visit.        Review of systems is as stated in HPI.  The remainder of the 10 system review is otherwise negative.    Objective:     /88   Pulse (!) 107   Wt (!) 292 lb (132.5 kg)   SpO2 97%   BMI 42.50 kg/m   Body mass index is 42.5 kg/m .    Gen: Alert, NAD, appears stated age, normal hygiene   Psych: no apparent hallucinations or delusions, alert, oriented x3, loquacious but appropriate      This note has been dictated using voice recognition software. Any grammatical or context distortions are unintentional and inherent to the the software.     Shy Kamara MD

## 2021-06-10 NOTE — PROGRESS NOTES
Initial Outpatient Psychiatry Consult Note      The patient presents for his first psychiatric intake with this clinic. I had an opportunity to review the records that were available to me which did include some visits through his primary care clinic. The patient carries a diagnosis of attention deficit disorder, anxiety, and moderate level major depressive disorder. He's also had a long-standing history of methamphetamine abuse. He's chronically been treated with Adderall most recently I believe at 30 mg a day. He presents to this  Clinic on August 26, 2024 and intake. At the time of the intake the patient is on Prozac 40 mg a day, Adderall 30 mg a day, Wellbutrin 300 mg a day and he's on lisinopril as well for hypertension. The patient also has a prior diagnosis of primary insomnia. The patient presented to this clinic apparently at the request of his primary care doctor who he tells me no longer wants to manage his psychotropic medication.    HPI:   The patient presented for a video visit, apparently referred by his primary care doctor. He tells me he is never had a psychiatrist before although in our discussions it sounds as if he had been heard through a chemical dependency program in the past. He minimized any recent or past chemical dependency issues but does state that he needs psychotropic medication to manage his  Attention deficit disorder and depression.     The patient tells me that he was diagnosed with attention deficit disorder around age 17 and had many years of behavioral dysfunction in school. He describes himself as chronically having poor focus and needing special classes. He however denies he's ever had any psychiatric hospitalizations. He denies ever having any suicidality or psychosis. He denies self-injurious behaviors. He was somewhat vague however on past psychiatric symptoms but told me he had been on Klonopin and Xanax and Adderall as well as Strattera in the past. He stated that the  "Adderall works well for him but his doctor wanted him to get that medication through us. He was big on his other medications and their effectiveness but he does report to me that he had been on Lexapro in the past with some improvement. He does not believe that the Prozac, which he is currently on parentheses has been particularly helpful.     The patient tells me sleeping well. No change in appetite. He does report he's chronically had difficulty being overweight and he's trying to live a more healthy lifestyle and exercise by writing his bike daily. He tells me sleeping well his appetite is good. 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 He often loses focus and believes that the Adderall has helped them with that. He describes himself as chronically depressed but denies having any cyclical nature to this. He states his depression is often situational related to his job. He states he chronically has low level of anxiety but also reports he has panic attacks at times when thinks about the situation in life.     No history of any psychosis. He denies having violent thoughts. He denies having any history of aggression or violence.     The patient tends to greatly minimize any prior chemical dependency issues despite the history that seems to be documented in the chart he states that it's never been a significant issue for him. He does admit to a DWI years ago but states that snow off his record. He was somewhat vague on past substance use. But states he never had a problem with substances. He has had 25 in the past and states that in the context of having conflict with his wife he was given the rule 25 and went through an outpatient program at \"[facility]\".    Current Medications:  Medications were personally reviewed at this meeting.  Please see the chart for current " "medication list.         Past medical History:   patient states he struggled with obesity his entire adult life. He states he's been on diet pills in the past and is had times where he's attempted to adjust his diet.   Patient has a history of hypertension     the patient tells his current medical status is the best that it is been in years    Past Psychiatric History:   patient was a bit vague regarding past psychiatric history but he states he's never seen a psychiatrist in the past. He apparently was involved in a chemical dependency and war psychology program in the past possibly related to a DUI or possibly related to marital issues but he preferred not to talk about those things at this time. He states that he's never been psychiatrically hospitalized. He denies having any history of psychosis. He denies having any history of tal. He denies ever having any self-injurious behaviors or suicidality. He reports he has been on Lexapro in the past which was somewhat helpful for him although he was big on details. He states that Adderall works well for him. He's also been on Xanax and Klonopin in the past. He states he dislikes the Prozac that he's been on recently because he does not believe it's been helpful. He states he's been on Wellbutrin for some time but was vague on details regarding that. He seemed to think however that was possibly somewhat helpful.      Substance Abuse History:   Patient carries a diagnosis of methamphetamine abuse but he was quite vague on details. He reports he does not have any chemical dependency issues currently and minimizes any past problems. He did not discuss any recent use. He apparently had a DUI seven years ago but states \"that's off my record now\". The patient seem to suggest that he may have had a rule 25 assessment within the last year in the context of some marital discord but stated he did not want to talk about it.      Family History:   Patient reports multiple family " "members have struggled with mental health issues. He states that his grandfather and mother both were diagnosed with anxiety and depression. He has a sister and brother also who were diagnosed with depression. He states he has a brother was diagnosed with ADHD and he reports his mother was diagnosed with diabetes mellitus earlier this year. He describes his mother as a \"functional alcoholic\" but he was vague on details.      Social History:   The patient tells me he was diagnosed with ADHD at age 17 when he was having significant behavioral issues at school and could not maintain focus. He states that he was [place] a [place] figure [place]. He graduated from [email or web address] high school but apparently was in special classes for behavioral and learning difficulties. He states he is the father of two his grandfather currently lives in [place] but he has very little contact with this family of origin. 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 His wife is the clinical coordinator for a surgery center in [place] the patient states he was homeless last year and was \"couch surfing\" as recently as April but also stated he did not want to talk about this the patient states he rides his bike 6 miles a day to stay in shape but he's apparently struggled with weight for a number of years.                    Mental Status Exam:  Appearance:  Patient was a bit distractible. He was interviewed via video and there was a bit of commotion in the house. The patient appeared in no obvious pain and was not short of breath but appeared overall somewhat disinterested at times.  Behavior:  The patient tended to control the interview. He was a bit guarded at times and did become irritable and defensive at times. He was not hostile  Speech:  Sentence structure was intact. He was quite talkative. He tended to ramble a bit at times and needed occasional " redirection. He tended to control the conversation. No pressure quality not thick or slurred.  Mood/Affect:  Mood was a bit restricted. Not obviously depressed but somewhat flat.  Thought Content:  No hallucinations or delusions  Suicidal or Homicidal Thoughts:  No suicidal or homicidal ideation   Thought Process/Formulation:  Somewhat concrete and deliberate. Not loose. No racing thoughts.  Associations:  Fair but a bit vague  Fund of Knowledge:  Fair. Patient reports no recent changes  Attention/Concentration:  A bit distractible and somewhat disorganized. At times somewhat perseverative.  Insight:  Somewhat impaired  Judgement:  fair  Memory:  fair  Motor Status:  No obvious tremor. No asymmetry  Fund of Knowledge:  adequate  Orientation: grossly oriented      Recent Labs:   please see the chart for details. The patient is been followed by his primary care physician for routine laboratory management.      Diagnosis:    Anxiety disorder, NOS, by history    ADD, by history     Methamphetamine abuse, in remission, by history       I interviewed the patient via my chart teleservices for approximately 45 minutes today.    Plan:  At this time I'm going to increase the patient's Prozac to 60 mg a day. He's been on this medication for quite some time and is unsure whether it's been helpful but prior to moving to an alternative medication I would like to increase this medication first. Risks and benefits were discussed.     At this time I will make no changes in the patient's Adderall. I would like to try to decrease this medication and consider medication options from an alternative class but the patient at this time is not interested in considering this. He reports the Adderall is helpful and well-tolerated. He reports he's been compliant with it and not misusing it.     I suggested to the patient that we consider individual therapy which could help with anxiety and some larger issues that the patient apparently has  been dealing with for years. He is not interested in this time but stated we could discuss this in the future.    I recommend that I follow the patient from a psychiatric standpoint.  I will assess for the appropriateness of possible psychotropic medication trials/changes.  The patient will seek out appropriate emergency services should that become necessary.  I will make myself available if any questions, concerns, or problems arise.       Thank you for allowing me to participate in the care of this patient.

## 2021-06-10 NOTE — PROGRESS NOTES
"Chu Pimentel is a 34 y.o. male who is being evaluated via a billable telephone visit.      The patient has been notified of following:     \"This telephone visit will be conducted via a call between you and your physician/provider. We have found that certain health care needs can be provided without the need for a physical exam.  This service lets us provide the care you need with a short phone conversation.  If a prescription is necessary we can send it directly to your pharmacy.  If lab work is needed we can place an order for that and you can then stop by our lab to have the test done at a later time.    Telephone visits are billed at different rates depending on your insurance coverage. During this emergency period, for some insurers they may be billed the same as an in-person visit.  Please reach out to your insurance provider with any questions.    If during the course of the call the physician/provider feels a telephone visit is not appropriate, you will not be charged for this service.\"    Patient has given verbal consent to a Telephone visit? Yes    What phone number would you like to be contacted at? [phone]    Patient would like to receive their AVS by AVS Preference: Vern.    Additional provider notes:  Assessment and Plan:     1. Essential hypertension  amLODIPine (NORVASC) 10 MG tablet    lisinopriL (PRINIVIL,ZESTRIL) 10 MG tablet   2. Attention deficit hyperactivity disorder (ADHD), predominantly inattentive type  Ambulatory referral to Psychiatry   3. Moderate episode of recurrent major depressive disorder (H)  Ambulatory referral to Psychiatry   4. Anxiety  Ambulatory referral to Psychiatry     I renewed his lisinopril and amlodipine.  I encouraged him to monitor his blood pressure outside the clinic.  I explained to him today that I am unable to make any changes to his Adderall because his previous prescription was for 30 days.  He states he has an appointment with Dr. Kamara next " Friday.  I recommend that he bring the rest of his Adderall capsules into the clinic and Dr. Kamara can decide if she is comfortable making any changes at that point.  I did refer psychiatry due to history of abuse.  He is content with the plan.    Subjective:     Chu is a 34 y.o. male presenting for a phone visit.  Patient calls today initially stating that he misplaced his amlodipine in the middle of moving.  Patient is currently taking amlodipine 10 mg daily and lisinopril 20 mg daily for hypertension.  He does not monitor his blood pressure outside of the clinic.  He denies headaches, blurry vision, chest pain, shortness of breath with exertion, edema, orthopnea, syncope.  Patient then approached me with changing his Adderall dose.  He is currently taking Adderall XR 30 mg daily.  Patient was prescribed this dose on 7/16/2020.  Patient would like to return to regular release Adderall dosing.  He has a history of methamphetamine abuse.  I explained that it is too early to make any changes to his current prescription and patient sounded frustrated.  Due to his history of abuse and difficulty obtaining a dose that works well for him.,  I did suggest psychiatry, but then he said that he regular release Adderall works well for him.    Review of Systems: A complete 14 point review of systems was obtained and is negative or as stated in the history of present illness.    Social History     Socioeconomic History     Marital status: Single     Spouse name: Not on file     Number of children: Not on file     Years of education: Not on file     Highest education level: Not on file   Occupational History     Not on file   Social Needs     Financial resource strain: Not on file     Food insecurity     Worry: Not on file     Inability: Not on file     Transportation needs     Medical: Not on file     Non-medical: Not on file   Tobacco Use     Smoking status: Current Every Day Smoker     Packs/day: 0.50     Smokeless  tobacco: Never Used   Substance and Sexual Activity     Alcohol use: Yes     Comment: four-five/month     Drug use: Not Currently     Types: Methamphetamines     Comment: hx of meth use      Sexual activity: Not on file     Comment: single    Lifestyle     Physical activity     Days per week: Not on file     Minutes per session: Not on file     Stress: Not on file   Relationships     Social connections     Talks on phone: Not on file     Gets together: Not on file     Attends Oriental orthodox service: Not on file     Active member of club or organization: Not on file     Attends meetings of clubs or organizations: Not on file     Relationship status: Not on file     Intimate partner violence     Fear of current or ex partner: Not on file     Emotionally abused: Not on file     Physically abused: Not on file     Forced sexual activity: Not on file   Other Topics Concern     Not on file   Social History Narrative    7/15/2020        Active Ambulatory Problems     Diagnosis Date Noted     Essential hypertension 10/23/2017     Anxiety 10/23/2017     Recurrent major depressive disorder (H) 10/23/2017     Migraine without aura 10/23/2017     Tobacco abuse 10/23/2017     Class 3 severe obesity due to excess calories with serious comorbidity and body mass index (BMI) of 40.0 to 44.9 in adult (H) 03/12/2019     Insomnia due to other mental disorder 04/27/2020     Attention deficit hyperactivity disorder (ADHD), predominantly inattentive type 07/16/2020     Resolved Ambulatory Problems     Diagnosis Date Noted     Methamphetamine abuse in remission (H) 03/12/2019     Past Medical History:   Diagnosis Date     Depression      Hypertension      Obesity        Family History   Problem Relation Age of Onset     Hypertension Mother      Hypertension Father        Objective:     There were no vitals taken for this visit.    Patient is alert and is speaking clearly.  He does not sound short of breath.       Phone call duration:  5  merrick Ortiz, CNP

## 2021-06-10 NOTE — TELEPHONE ENCOUNTER
Called and discussed with patient  The reason we arranged the telephone visit was in hopes to adjust his adderall but since this cannot be done until he is seen in clinic patient elected to cancel his appointment on Thursday and wait until 8/7/2020 to be seen.

## 2021-06-10 NOTE — PATIENT INSTRUCTIONS - HE
I am going to increase the patient's Prozac to 60 mg a day. Risks and benefits were discussed. We will continue with his other psychotropic medication as ordered although I would eventually like to try and consider alternative options to the stimulants. I'm in the process of trying to build rapport. I also believe the patient could benefit from individual therapy but is declining that option at this time.

## 2021-06-10 NOTE — PATIENT INSTRUCTIONS - HE
Patient Education   Personalized Prevention Plan  You are due for the preventive services outlined below.  Your care team is available to assist you in scheduling these services.  If you have already completed any of these items, please share that information with your care team to update in your medical record.  Health Maintenance   Topic Date Due     DEPRESSION ACTION PLAN  1987     HIV SCREENING  10/28/2002     TD 18+ HE  10/28/2005     TDAP ADULT ONE TIME DOSE  10/28/2005     PNEUMOCOCCAL IMMUNIZATION 19-64 MEDIUM RISK (1 of 1 - PPSV23) 10/28/2006     MEDICARE ANNUAL WELLNESS VISIT  03/12/2020     INFLUENZA VACCINE RULE BASED (1) 08/01/2020     ADVANCE CARE PLANNING  10/23/2022     HEPATITIS B VACCINES  Aged Out

## 2021-06-11 NOTE — TELEPHONE ENCOUNTER
Date of Last Office Visit: 8/26/2020  Date of Next Office Visit: 10/7/2020  No shows since last visit: none  Cancellations since last visit: none  ED visits since last visit:  none    Medication Adderall 10 mg and 30 mg date last ordered: 8/14/2020  Qty: 30  Refills: 0    Lapse in therapy greater than 7 days: none  Medication refill request verified as identical to current order: yes  Result of Last DAM, VPA, Li+ Level, CBC, or Carbamazepine Level (at or since last visit): n/a       [] Medication refilled per [facility] M-1.   [x] Medication unable to be refilled by RN due to criteria not met as indicated below:     []Eligibility - not seen in last year    []Supervision - no future appointment    []Compliance     []Verification - order discrepancy    [x]Controlled Medication    [x]Medication not included in RN Protocol    []90 - day supply request    []Other   Current Medication list:  Chu Pimentel    (MRN [id number])   Your Current Medications Are     amLODIPine (NORVASC) 10 MG tablet  Take 1 tablet (10 mg total) by mouth daily.    dextroamphetamine-amphetamine (ADDERALL) 10 mg Tab tablet  Take 1 tablet by mouth daily.    dextroamphetamine-amphetamine (ADDERALL) 30 mg Tab  Take 30 mg by mouth daily.    FLUoxetine (PROZAC) 20 MG capsule  Take 3 capsules (60 mg total) by mouth daily.    ibuprofen (ADVIL,MOTRIN) 200 MG tablet  Take 200-400 mg by mouth.    lisinopriL (PRINIVIL,ZESTRIL) 20 MG tablet  Take 1 tablet (20 mg total) by mouth daily.        Medication Plan of Care at last office visit with MD/CNP:     Plan:  At this time I'm going to increase the patient's Prozac to 60 mg a day. He's been on this medication for quite some time and is unsure whether it's been helpful but prior to moving to an alternative medication I would like to increase this medication first. Risks and benefits were discussed.      At this time I will make no changes in the patient's Adderall. I would like to try to decrease this medication and  consider medication options from an alternative class but the patient at this time is not interested in considering this. He reports the Adderall is helpful and well-tolerated. He reports he's been compliant with it and not misusing it.      I suggested to the patient that we consider individual therapy which could help with anxiety and some larger issues that the patient apparently has been dealing with for years. He is not interested in this time but stated we could discuss this in the future.     I recommend that I follow the patient from a psychiatric standpoint.  I will assess for the appropriateness of possible psychotropic medication trials/changes.  The patient will seek out appropriate emergency services should that become necessary.  I will make myself available if any questions, concerns, or problems arise.

## 2021-06-11 NOTE — TELEPHONE ENCOUNTER
Patient went to pharmacy to  adderall- pharmacy said it's filled but patient cannot  as Dr. Alvarez is not covered under [place].      [id number] [place] [id number] [facility] [place] [facility] [phone] (Phone)  [phone] (Fax)       Can someone call to advise?    Thank you!

## 2021-06-11 NOTE — TELEPHONE ENCOUNTER
Date of Last Office Visit: 8/26/2020  Date of Next Office Visit: 10/7/2020  No shows since last visit: none  Cancellations since last visit: none  ED visits since last visit:  none     Medication Adderall 10 mg and 30 mg date last ordered: 8/14/2020  Qty: 30  Refills: 0     Lapse in therapy greater than 7 days: none  Medication refill request verified as identical to current order: yes  Result of Last DAM, VPA, Li+ Level, CBC, or Carbamazepine Level (at or since last visit): n/a         []? Medication refilled per [facility] M-1.   [x]? Medication unable to be refilled by RN due to criteria not met as indicated below:                []?Eligibility - not seen in last year               []?Supervision - no future appointment               []?Compliance                []?Verification - order discrepancy               [x]?Controlled Medication               [x]?Medication not included in RN Protocol               []?90 - day supply request               []?Other            Current Medication list:  Chu Pimentel    (MRN [id number])   Your Current Medications Are      amLODIPine (NORVASC) 10 MG tablet  Take 1 tablet (10 mg total) by mouth daily.    dextroamphetamine-amphetamine (ADDERALL) 10 mg Tab tablet  Take 1 tablet by mouth daily.    dextroamphetamine-amphetamine (ADDERALL) 30 mg Tab  Take 30 mg by mouth daily.    FLUoxetine (PROZAC) 20 MG capsule  Take 3 capsules (60 mg total) by mouth daily.    ibuprofen (ADVIL,MOTRIN) 200 MG tablet  Take 200-400 mg by mouth.    lisinopriL (PRINIVIL,ZESTRIL) 20 MG tablet  Take 1 tablet (20 mg total) by mouth daily.          Medication Plan of Care at last office visit with MD/CNP:     Plan:  At this time I'm going to increase the patient's Prozac to 60 mg a day. He's been on this medication for quite some time and is unsure whether it's been helpful but prior to moving to an alternative medication I would like to increase this medication first. Risks and benefits were discussed.      At  this time I will make no changes in the patient's Adderall. I would like to try to decrease this medication and consider medication options from an alternative class but the patient at this time is not interested in considering this. He reports the Adderall is helpful and well-tolerated. He reports he's been compliant with it and not misusing it.      I suggested to the patient that we consider individual therapy which could help with anxiety and some larger issues that the patient apparently has been dealing with for years. He is not interested in this time but stated we could discuss this in the future.     I recommend that I follow the patient from a psychiatric standpoint.  I will assess for the appropriateness of possible psychotropic medication trials/changes.  The patient will seek out appropriate emergency services should that become necessary.  I will make myself available if any questions, concerns, or problems arise.

## 2021-06-11 NOTE — TELEPHONE ENCOUNTER
Returned call to patient. He is concerned about his medication and wants pcp to be notified and and requested that his pcp fill meds if possible. Patient said he sent message to pcp and no response yet.     Provider approved medications, however, pharmacist is reporting that he is not covered under MA.

## 2021-06-11 NOTE — TELEPHONE ENCOUNTER
Pt would like to connect ASAP. He wants to know what to do about a non covered provider. Attempted to connect with nurse triage they advised they don't have answers yet and they will get back to him.

## 2021-06-11 NOTE — TELEPHONE ENCOUNTER
Date of Last Office Visit: 08/26/20  Date of Next Office Visit: 10/7/20  No shows since last visit: 0  Cancellations since last visit: 0  ED visits since last visit:  0    Medication fluoxetine 20 mg date last ordered: 8/26/20  Qty: 90  Refills: 2    Lapse in therapy greater than 7 days: No. Pharmacy sent a request to have MA credentialed provider resend script. Dr. Alvarez's credentialing for MA is still pending. ODALYS Mitchell agreed to fill a month supply.  Medication refill request verified as identical to current order: Yes  Result of Last DAM, VPA, Li+ Level, CBC, or Carbamazepine Level (at or since last visit): N/A     [] Medication refilled per [facility] M-1.   [x] Medication unable to be refilled by RN due to criteria not met as indicated below:     []Eligibility - not seen in last year    []Supervision - no future appointment    []Compliance     []Verification - order discrepancy    []Controlled Medication    []Medication not included in RN Protocol    []90 - day supply request    [x]Other - requesting different provider to cover     Current Medication list:  Chu Pimentel    (MRN [id number])   Your Current Medications Are     amLODIPine (NORVASC) 10 MG tablet  Take 1 tablet (10 mg total) by mouth daily.    dextroamphetamine-amphetamine (ADDERALL) 10 mg Tab tablet  Take 1 tablet by mouth daily.    dextroamphetamine-amphetamine (ADDERALL) 30 mg Tab  Take 30 mg by mouth daily.    FLUoxetine (PROZAC) 20 MG capsule  Take 3 capsules (60 mg total) by mouth daily.    ibuprofen (ADVIL,MOTRIN) 200 MG tablet  Take 200-400 mg by mouth.    lisinopriL (PRINIVIL,ZESTRIL) 20 MG tablet  Take 1 tablet (20 mg total) by mouth daily.        Medication Plan of Care at last office visit with MD/CNP:  Plan:  At this time I'm going to increase the patient's Prozac to 60 mg a day. He's been on this medication for quite some time and is unsure whether it's been helpful but prior to moving to an alternative medication I would like to  increase this medication first. Risks and benefits were discussed.      At this time I will make no changes in the patient's Adderall. I would like to try to decrease this medication and consider medication options from an alternative class but the patient at this time is not interested in considering this. He reports the Adderall is helpful and well-tolerated. He reports he's been compliant with it and not misusing it.      I suggested to the patient that we consider individual therapy which could help with anxiety and some larger issues that the patient apparently has been dealing with for years. He is not interested in this time but stated we could discuss this in the future.     I recommend that I follow the patient from a psychiatric standpoint.  I will assess for the appropriateness of possible psychotropic medication trials/changes.  The patient will seek out appropriate emergency services should that become necessary.  I will make myself available if any questions, concerns, or problems arise.

## 2021-06-11 NOTE — TELEPHONE ENCOUNTER
Message received from [facility]'s pharmacy inquiring about this issue. Please call [facility] [phone].

## 2021-06-11 NOTE — TELEPHONE ENCOUNTER
Spoke with pharmacist Keri at [facility] pharmacy who will cancel previous scripts from patient present provider. Another clinic provider (kelly HALE) has agreed to refill patient's medication. Med prep and routed to her.     RN called patient and left VM for patient to check in with his pharmacy.

## 2021-06-11 NOTE — TELEPHONE ENCOUNTER
I called the pharmacy and Dr. Alvarez is not a covered provider under patients insurance plan. Patient has medical assistance and a covered provider will need to send in his adderall for it to be covered.     Please advise

## 2021-06-12 NOTE — TELEPHONE ENCOUNTER
Controlled Substance Refill Request  Medication Name:   Requested Prescriptions     Pending Prescriptions Disp Refills     LORazepam (ATIVAN) 0.5 MG tablet 4 tablet 0     Sig: Take 1 tablet (0.5 mg total) by mouth 2 (two) times a day as needed for anxiety.     Date Last Fill: 10/8/20  Requested Pharmacy: [facility]  Submit electronically to pharmacy  Controlled Substance Agreement on file:   Encounter-Level CSA Scan Date:    There are no encounter-level csa scan date.        Last office visit:  8/14/20

## 2021-06-12 NOTE — TELEPHONE ENCOUNTER
Pt was seen by Dr. Alvarez today and all three prescriptions were approved at today's visit but provider is not currently credentialed with [place]. Called and verified with pharmacy staff that prescription for Prozac was rejected by his ins stating provider not covered.   Request for refill re-sent to Radha Agudelo asking to cover for Dr. Alvarez

## 2021-06-12 NOTE — TELEPHONE ENCOUNTER
Pt called and is planning on going on vacation soon and would like to be prescribed ativan to help with anxiety he has while flying.  Pt would like a call to discuss.

## 2021-06-12 NOTE — TELEPHONE ENCOUNTER
Patients Adderall script was sent to [place] on 10-12-20, bu he needs them sent to [facility] in [place] which is in his profile.

## 2021-06-12 NOTE — TELEPHONE ENCOUNTER
Refill Approved    Rx renewed per Medication Renewal Policy. Medication was last renewed on 7/31/20.    Katelin Dos Santos, [facility] Triage/Med Refill 10/12/2020     Requested Prescriptions   Pending Prescriptions Disp Refills     amLODIPine (NORVASC) 10 MG tablet 30 tablet 0     Sig: Take 1 tablet (10 mg total) by mouth daily.       Calcium-Channel Blockers Protocol Passed - 10/9/2020  6:52 PM        Passed - PCP or prescribing provider visit in past 12 months or next 3 months     Last office visit with prescriber/PCP: Visit date not found OR same dept: 8/14/2020 Shy Kamara MD OR same specialty: 8/14/2020 Shy Kamara MD  Last physical: 3/12/2019 Last MTM visit: Visit date not found   Next visit within 3 mo: Visit date not found  Next physical within 3 mo: Visit date not found  Prescriber OR PCP: Riri Ortiz CNP  Last diagnosis associated with med order: 1. Essential hypertension  - amLODIPine (NORVASC) 10 MG tablet; Take 1 tablet (10 mg total) by mouth daily.  Dispense: 30 tablet; Refill: 0    If protocol passes may refill for 12 months if within 3 months of last provider visit (or a total of 15 months).             Passed - Blood pressure filed in past 12 months     BP Readings from Last 1 Encounters:   08/14/20 130/88

## 2021-06-12 NOTE — PROGRESS NOTES
Initial Outpatient Psychiatry Consult Note   The patient carries a diagnosis of attention deficit disorder, anxiety, and moderate level major depressive disorder. He's also had a long-standing history of methamphetamine abuse. He's chronically been treated with Adderall most recently I believe at 30 mg a day. He presented to this clinic on August 26, 2020 for intake. At the time of the intake the patient was on Prozac 40 mg a day, Adderall 30 mg a day, Wellbutrin 300 mg a day and and on lisinopril as well for hypertension. The patient also has a prior diagnosis of primary insomnia. The patient presented to this clinic apparently at the request of his primary care doctor who he tells me no longer wants to manage his psychotropic medication.     The patient tells me that he was diagnosed with attention deficit disorder around age 17 and had many years of behavioral dysfunction in school.   At his initial visit with me in August 2020 we did increase his Prozac to 60 mg day.     HPI:   The patient presents today for a phone visit. Tells me that he stopped the Prozac a week or two ago. Initially he told me it was because there was some confusion with reordering the medication with payment however later he admitted that he had some Prozac available but chose not to take it because he didn't think he should. He was vague on specifics but he was quite interested in continuing with his Adderall and focus quite a bit of our discussion on making sure we didn't discontinue that yet. Again, I spent quite a bit of time talking about the ultimate treatment plan would be to try and wean him off of the stimulants and try alternatives. He was resistant to this but stated he would follow through with that plan.     The patient denies that he has any desire to be dad or thoughts of suicide but he admits he still depressed. He was vague on whether the depression was worse and stated he can take used to have trouble with focus and  concentration but recently has had lower energy and low motivation. When I suggested that that might've been related to cutting back or stopping the Prozac three weeks ago he stated he thought that was possible.     Patient denies having any health concerns or new issues healthwise. He again was quite talkative and tended to ramble a bit and was a bit defensive, especially when I brought up alternatives to the stimulants.        Current Medications:  Medications were personally reviewed at this meeting.  Please see the chart for current medication list.             Mental Status Exam:  Appearance:  The patient was interviewed via the phone  Behavior:  Patient was a bit irritable and slightly argumentative. He tended to control the conversation.  Speech:   He again was quite talkative and at times was a bit rapid in his speech. He again tended to ramble a bit.  Mood/Affect:   Slightly irritable. Possibly somewhat depressed. Not labile..  Thought Content:  No hallucinations or delusions  Suicidal or Homicidal Thoughts:  No suicidal or homicidal ideation   Thought Process/Formulation:   Somewhat concrete and occasionally asking me to repeat things.  Associations:   Fair.  Fund of Knowledge:  Adequate.  Attention/Concentration:  Again, somewhat disorganized and perseverative.  Insight:  Somewhat impaired  Judgement:  fair  Memory:  fair  Motor Status:  No obvious tremor. No asymmetry  Fund of Knowledge:  adequate  Orientation: grossly oriented      Recent Labs:   Please see the chart for details. The patient is been followed by his primary care physician for routine laboratory management.      Diagnosis:    Anxiety disorder, NOS, by history    ADD, by history     Methamphetamine abuse, in remission, by history       I interviewed the patient for 25 minutes today.    Plan:   The patient will return to this clinic in 2 to 3 months for a medication check. We have restarted the patient's Prozac. I again I spoke with him  about the importance of compliance and reminded him of our ultimate treatment plan to switch him from the stimulant to alternatives at some point. Although somewhat resistively stated he would consider this but wanted to first get on the Prozac for a period of time.     We will consider a taper of the stimulant in the future. We will also consider augmenting is antidepressant.     The patient again was not interested in individual therapy.    The patient will seek out appropriate emergency services should that become necessary.  I will make myself available if any questions, concerns, or problems arise.       Hans Alvarez MD

## 2021-06-12 NOTE — TELEPHONE ENCOUNTER
Spoke with patient  He will be traveling by airplane on Friday and will return on Wednesday.  He reports he gets anxiety when traveling by plane and is wondering if Dr. Kamara could send a small prescription for Ativan to [facility] in [place] to help with his upcoming travel?

## 2021-06-12 NOTE — TELEPHONE ENCOUNTER
Returned call to patient and he is concerned and needs his prescription before going on vacation.     RN informed patient that providers need to approve early fill and Radha HALE who prescribed / send script is not in clinic today. Patient was told encounter will be routed over to providers and RN will wait for response to proceed.     RN called pharmacy and pharmacy staff said patient earliest to get refill is Sunday, 10/11/2020 (RN informed patient).     Patient also encouraged to send a Vocation location info through MyChart just incase it is possible to get adderal sent over while patient is in [place] for vacation, since patient will be out of [place] by the end of the day today and RN may not get reply from providers today.

## 2021-06-12 NOTE — PROGRESS NOTES
Initial Outpatient Psychiatry Consult Note   The patient carries a diagnosis of attention deficit disorder, anxiety, and moderate level major depressive disorder. He's also had a long-standing history of methamphetamine abuse. He's chronically been treated with Adderall most recently I believe at 30 mg a day. He presented to this clinic on August 26, 2020 for intake. At the time of the intake the patient was on Prozac 40 mg a day, Adderall 30 mg a day, Wellbutrin 300 mg a day and and on lisinopril as well for hypertension. The patient also has a prior diagnosis of primary insomnia. The patient presented to this clinic apparently at the request of his primary care doctor who he tells me no longer wants to manage his psychotropic medication.     The patient tells me that he was diagnosed with attention deficit disorder around age 17 and had many years of behavioral dysfunction in school.   At his initial visit with me in August 2020 we did increase his Prozac to 60 mg day.    I saw the patient in October 2020, three weeks after his initial intake. He had stopped the Prozac two weeks prior to that because he stated there was some confusion about reordering the medication and its payment. Again, he was quite interested in continuing with the Adderall and was concerned that we would again talk about trying to decrease or eliminate that in the future. We restarted the Prozac and reinitiated the plan at that time.     HPI:   I interviewed the patient by phone today. He reported no significant change from our last visit.  Initially he told me he was doing fairly well but stated he chronically has anxiety. He's currently looking for a job and he admits he's a bit depressed at times. He again denied having any suicidal ideation and denied that he ever had such. He denied having any hallucinations or delusions. He reports no change in sleep or appetite. He states he recently has started psychotherapy to learn how to relax. He  is restarted the Prozac as we ordered a few weeks ago and he states he's tolerating that and believes his mood is improved somewhat.     The patient again began asking for an increase in his Adderall and asking for Ativan and I again attempted to redirect him and he tended to perseverate on this issue and I had a difficult time moving on past this. He then told me he was not doing as well and needed more of that type of medication. He was extremely difficult to redirect. He was a bit inconsistent in the history he was providing me but ultimately agreed to continue with the individual psychotherapy and with the prior increase in the Prozac. He was tolerating that medication.        Current Medications:  Medications were personally reviewed at this meeting.  Please see the chart for current medication list.             Mental Status Exam:  Appearance:  Patient was interviewed by phone.  Behavior:   Patient was a bit irritable and argumentative again today as the interview progressed. He was a bit perseverative.  Speech:  Patient was quite talkative again. He tended to control the interview and speak over me. At times he was slightly pressured in his speech.  Mood/Affect:   Patient again was somewhat irritable not significantly depressed. He did become somewhat angry at times.  Thought Content:  No hallucinations or delusions  Suicidal or Homicidal Thoughts:  No suicidal or homicidal ideation . He denied such.  Thought Process/Formulation:    Again, he was a bit perseverative. He tended to ramble a bit.  Associations:   Fair.  Fund of Knowledge:  Adequate.  Attention/Concentration:  Again, somewhat disorganized and perseverative.  Insight:  Somewhat impaired. No obvious recent change  Judgement:  fair  Memory:  fair  Motor Status:  No obvious tremor. No asymmetry  Fund of Knowledge:  adequate  Orientation: grossly oriented      Recent Labs:   Please see the chart for details. The patient is been followed by his primary  care physician for routine laboratory management.      Diagnosis:    Anxiety disorder, NOS, by history    ADD, by history     Methamphetamine abuse, in remission, by history       I interviewed the patient for 22 minutes today.    Plan:   The patient will return to clinic in three months for medication check.     All of the patient has been a bit resistive I would like to try and taper is benzo days of pain and his stimulant in the future. He did tell me today that he has agreed to go to psychotherapy and I again encouraged that.      The patient will seek out appropriate emergency services should that become necessary.  I will make myself available if any questions, concerns, or problems arise.       Hans Alvarez MD

## 2021-06-12 NOTE — TELEPHONE ENCOUNTER
Called [facility], spoke to Naveen (pharmasist) and d/c'd prescriptions for Adderall 10 and 30 mg and Prozac 20 mg dated 10/8/20 and approved by Dr. Alvarez ( duplicate prescription)

## 2021-06-12 NOTE — TELEPHONE ENCOUNTER
RN called [facility] pharmacy in [place] and spoke with pharmacy tech Dorene who verbally confirmed that patient Adderall 10 and 30 mg scripts will be canceled as requested (provider discontinued).

## 2021-06-12 NOTE — TELEPHONE ENCOUNTER
Patient left same message on refill line asking that his adderal be sent back to the [facility] in [place].  pulled, no means to attach. pls double check.      Spoke with Amaury pharmacist manager at [facility] in [place] and asked his to cancel both scripts. He agreed to do so and said it could be that patient insurance since it is [place] care would not pay for his scripts in [place]

## 2021-06-12 NOTE — TELEPHONE ENCOUNTER
RN called patient and he requested that staff send his Adderall to his original pharmacy in [place]. RN informed him that Radha HALE will send his meds today as she requested it be pended to her which RN did already.

## 2021-06-12 NOTE — PATIENT INSTRUCTIONS - HE
The patient will return to this clinic in 2 to 3 months for medication check. He agrees to call return sooner if questions or concerns arise. We've restarted his Prozac which is been off for about three weeks. We will attempt taper of his stimulants in the future. Again the patient has declined individual therapy.

## 2021-06-12 NOTE — PROGRESS NOTES
________________________________________  Medications Phoned  to Pharmacy [] yes [x]no  Name of Pharmacist:  List Medications, including dose, quantity and instructions    Medications ordered this visit were e-scribed.  Verified by order class [x] yes  [] no   Adderall 10 mg & 30 mg   Medication changes or discontinuations were communicated to patient's pharmacy: [] yes  [x] no    Dictation completed at time of chart check: [x] yes  [] no    I have checked the documentation for today s encounters and the above information has been reviewed and completed.

## 2021-06-12 NOTE — TELEPHONE ENCOUNTER
Pt calls because he is trying to refills his prescriptions before going out of town tomorrow.    Pharmacy told him they need an approval from [facility] to  his adderol.    Pharmacy is [facility] [address] [facility] in [place]   [phone]

## 2021-06-12 NOTE — TELEPHONE ENCOUNTER
Will resend script as patient not in [place] per team and able to . Sending instead to requested pharmacy as currently in [place]. Support team directed to cancel previous [place] prescription.

## 2021-06-12 NOTE — PROGRESS NOTES
________________________________________  Medications Phoned  to Pharmacy [] yes [x]no  Name of Pharmacist:  List Medications, including dose, quantity and instructions    Medications ordered this visit were e-scribed.  Verified by order class [x] yes  [] no  SEE PAPALLEL REFILL ENCOUNTER  Medication changes or discontinuations were communicated to patient's pharmacy: [] yes  [] no   SEE PAPALLEL REFILL ENCOUNTER  Dictation completed at time of chart check: [x] yes  [] no    I have checked the documentation for today s encounters and the above information has been reviewed and completed.  ;

## 2021-06-13 NOTE — TELEPHONE ENCOUNTER
RN Triage:    Calling for refill of both Adderal prescriptions.  Will be out of medication of 12/12/20.  Last refill 10/28/20.  Last OV: 10/28/20    Request:  Please refill both Adderal prescriptions and send to [facility] on [address].  Chu is requesting a call back when prescriptions are sent.    Radha Valdes RN  [facility] Nurse Advisor

## 2021-06-13 NOTE — PROGRESS NOTES
Assessment/Plan:     Patient has a history of hypertension and anxiety/depression, neither are current at this time.  He does have a history of intermittent migraines for which she takes Relpax and would like a refill.  He also is currently smoking about 9 cigarettes per day.  He is interested in quitting.  We discussed Chantix, however with his history of depression in the past it was determined that a possible better option might be Wellbutrin.  Initiated on 150 mg once daily, with very strict return precautions.  Want patient to come back in 1 month for healthcare maintenance, evaluation of smoking cessation, evaluation of Wellbutrin, and weight management (pt is to lose 1 lb by next appointment which he will accomplish by decreasing fast food).    Total care spent in counseling about tobacco cessation > 10 minutes.    Problem List Items Addressed This Visit        ENT/CARD/PULM/ENDO Problems    Essential hypertension    Migraine without aura    Relevant Medications    eletriptan (RELPAX) 20 MG tablet       Other    Anxiety    Recurrent major depressive disorder    Relevant Medications    buPROPion (WELLBUTRIN XL) 150 MG 24 hr tablet    Tobacco abuse    Relevant Medications    nicotine (NICODERM CQ) 14 mg/24 hr    Other Relevant Orders    [place] TOBACCO USE CESSATION INTERMEDIATE 3-10 MINUTES      Other Visit Diagnoses     Health care maintenance    -  Primary    Relevant Orders    Influenza, Seasonal,Quad Inj, 36+ MOS    Lipid [place]    Comprehensive Metabolic Panel          AVS printed and given to patient.  Return to clinic in 4 weeks.    The following are part of a depression follow up plan for the patient:  mental health care assessment  The following high BMI interventions were performed this visit: weight monitoring and prescribed dietary intake    Subjective:      Chu Pimentel is a 31 y.o. male presents to clinic today for establish new care.  He had paperwork to fill out for him to be able to renew his  "[place] license.  He had no medical conditions would make this difficult.  He has a history of high blood pressure for which she is to be on medication, but does not murmur the name of it.  Is not currently on medication and his blood pressure is within normal limits.  In addition he has a history of depression/anxiety for which she is to be on citalopram.  Is not currently on this medication and has no thoughts of hurting himself or others.  His PHQ 9 today was a 3.    Current Outpatient Prescriptions   Medication Sig Dispense Refill     buPROPion (WELLBUTRIN XL) 150 MG 24 hr tablet Take 1 tablet (150 mg total) by mouth daily. 30 tablet 1     eletriptan (RELPAX) 20 MG tablet Take 1 tablet (20 mg total) by mouth as needed for migraine. may repeat in 2 hours if necessary 30 tablet 3     nicotine (NICODERM CQ) 14 mg/24 hr Place 1 patch on the skin daily. 28 patch 0     No current facility-administered medications for this visit.        Past Medical History, Family History, and Social History reviewed.    Review of systems is as stated in HPI, and the remainder of the 10 system review is otherwise negative.    Objective:     /86  Pulse 72  Ht 5' 10\" (1.778 m)  Wt (!) 288 lb (130.6 kg)  SpO2 95%  BMI 41.32 kg/m2 Body mass index is 41.32 kg/(m^2).    Gen: Alert, NAD, appears stated age  HEENT: normocephalic, without obvious deformities, atraumatic, nares normal, septum midline, neck is supple  CV: RRR, no murmur appreciated  Pulm: CTAB, no wheezes, rales or ronchi  MSK: grossly full range of motion in all joints, no obvious deformity, no edema  Neuro: CN II-XII grossly intact, alert, oriented x3  Psych: no apparent hallucinations or delusions, no pressured speech  SKIN: normal, dry    This note has been dictated using voice recognition software. Any grammatical or context distortions are unintentional and inherent to the the software.     Shy Kamara MD  [facility]      "

## 2021-06-13 NOTE — TELEPHONE ENCOUNTER
Call from patient who says he is unable to  his Adderall b/c Dr. Alvarez is not authorized under [place] Medicaid so they will not cover the medication.  Patient says another doctor had to send in the prescription the last time.    Call to [facility] cofirmed that Dr. Alvarez's NPI is not registered with [facility] and that Rx was covered under Radha Agudelo was the last time.    FNA advised will send message to the clinic.    Patient requests a call back on status when this is resolved.    Lesa Conroy RN/[facility]    Reason for Disposition    Caller has NON-URGENT medication question about med that PCP prescribed and triager unable to answer question    Additional Information    Negative: MORE THAN A DOUBLE DOSE of a prescription or over-the-counter (OTC) drug    Negative: DOUBLE DOSE (an extra dose or lesser amount) of over-the-counter (OTC) drug and any symptoms (e.g., dizziness, nausea, pain, sleepiness)    Negative: DOUBLE DOSE (an extra dose or lesser amount) of prescription drug and any symptoms (e.g., dizziness, nausea, pain, sleepiness)    Negative: Took another person's prescription drug    Negative: DOUBLE DOSE (an extra dose or lesser amount) of prescription drug and NO symptoms (Exception: a double dose of antibiotics)    Negative: Diabetes drug error or overdose (e.g., took wrong type of insulin or took extra dose)    Negative: Caller has medication question about med not prescribed by PCP and triager unable to answer question (e.g., compatibility with other med, storage)    Negative: Request for URGENT new prescription or refill of 'essential' medication (i.e., likelihood of harm to patient if not taken) and triager unable to fill per department policy    Negative: Prescription not at pharmacy and was prescribed today by PCP    Negative: Pharmacy calling with prescription questions and triager unable to answer question    Negative: Caller has URGENT medication question about med  that PCP prescribed and triager unable to answer question    Protocols used: MEDICATION QUESTION CALL-A-OH

## 2021-06-13 NOTE — TELEPHONE ENCOUNTER
Dr. Alvarez filled these yesterday, but this patient has MA.  Dr. Alvarez still is waiting for his credentialing for MA.  Will you approve these?    Pan the pharmacist is requesting another provider approve these.

## 2021-06-14 NOTE — PROGRESS NOTES
________________________________________  Medications Phoned  to Pharmacy [] yes [x]no  Name of Pharmacist:  List Medications, including dose, quantity and instructions    Medications ordered this visit were e-scribed.  Verified by order class [x] yes  [] no  Adderall 10 mg  Adderall 30 mg    Medication changes or discontinuations were communicated to patient's pharmacy: [] yes  [x] no    Dictation completed at time of chart check: [x] yes  [] no    I have checked the documentation for today s encounters and the above information has been reviewed and completed.

## 2021-06-14 NOTE — PROGRESS NOTES
Outpatient Psychiatry Note   The patient carries a diagnosis of attention deficit disorder, anxiety, and moderate level major depressive disorder. He's also had a long-standing history of methamphetamine abuse. He's chronically been treated with Adderall most recently I believe at 30 mg a day. He presented to this clinic on August 26, 2020 for intake. At the time of the intake the patient was on Prozac 40 mg a day, Adderall 30 mg a day, Wellbutrin 300 mg a day and and on lisinopril as well for hypertension. The patient also has a prior diagnosis of primary insomnia. The patient presented to this clinic apparently at the request of his primary care doctor who he tells me no longer wants to manage his psychotropic medication.     The patient tells me that he was diagnosed with attention deficit disorder around age 17 and had many years of behavioral dysfunction in school.   At his initial visit with me in August 2020 we did increase his Prozac to 60 mg day.    I saw the patient in October 2020, three weeks after his initial intake. He had stopped the Prozac two weeks prior to that because he stated there was some confusion about reordering the medication and its payment. Again, he was quite interested in continuing with the Adderall and was concerned that we would again talk about trying to decrease or eliminate that in the future. We restarted the Prozac and reinitiated the plan at that time.    I saw the patient in October 2020.  We had previously decreased his Prozac to 40 mg.  He again at that appointment was interested in continuing with the Adderall and Ativan was difficult to redirect.  We recommended individual psychotherapy.  We did not make any medication changes at that time.     HPI:  On my interview with the patient today he states that he continues to be frustrated over his current circumstances.  He was quite distractible and vague and at times was a bit inconsistent.  Ultimately he told me that he has  been using again.  He is now homeless.  He has been using methamphetamine.  He denies having any desire to be dead or thoughts of suicide.  No hallucinations or delusions.  No new medical issues or concerns.  He was variable as to whether he was actually depressed but reiterated he would never hurt himself and states he is just frustrated that he does not follow through with things.  He states he has not been misusing his medication but admits he has been using other substances but was vague on details.    We did spend quite a bit of time talking about his ongoing issues and lack of compliance and follow-through in the past.  He told me today he was willing to consider and enter chemical dependency treatment.  He states he has pending court issues regarding custody and feels as if he needs to address this or he may suffer the consequences in that court.  He denied side effects to the medication.  He denied having any other questions or concerns.    We talked about the possibility of a future medication change but at this point I would like to make sure the patient gets into a chemical dependency program and stays sober.  I do not want to add another variable as this is been a chronic issue.        Current Medications:  Medications were personally reviewed at this meeting.  Please see the chart for current medication list.             Mental Status Exam:  Appearance:  Patient was interviewed by phone.  Behavior: Patient was cooperative.  He seemed to acquiesce a bit more and seemed a bit more sincere in his desire to get help.  He was not irritable or agitated.  No lability.  Speech: Not thick or slurred but he seemed distractible and occasionally pause.  It was unclear whether he was attending to other stimuli or people but he needed occasional redirection and reminders to answer the question.  No pressured quality.  He was not rambling.  Answers were consistent and appropriate.  Mood/Affect: He was less irritable  today.  Not labile.  No tal.  He did appear slightly flat.  Thought Content:  No hallucinations or delusions  Suicidal or Homicidal Thoughts:  No suicidal or homicidal ideation . He denied such.  He denied that he has ever been actually suicidal.  Thought Process/Formulation: Less perseverative today.  He reports he is not loose.  He is not disorganized.  He seemed to be a bit distractible during the interview as described above.  Associations:   Fair.  Fund of Knowledge:  Adequate.  Attention/Concentration: Able to follow conversation for the most part but was occasionally distractible.  No racing thoughts.  Insight: No obvious recent significant change.  Perhaps slightly improved.  Judgement:  fair  Memory:  fair  Motor Status: Patient does not report any tremor or asymmetry.  Fund of Knowledge:  adequate  Orientation: grossly oriented      Recent Labs:   Please see the chart for details. The patient is been followed by his primary care physician for routine laboratory management.      Diagnosis:    Anxiety disorder, NOS, by history    ADD, by history     Methamphetamine abuse, in remission, by history       I interviewed the patient for 22 minutes    Plan:  At this time we will continue with the current medication regime.  Patient admits relapsing with meth.  It is unclear whether he ever would maintain significant sobriety.  He is now willing to be assessed and possibly enter a chemical dependency treatment program and I will refer him for that assessment.  He is not actively suicidal and is able to contract for safety.  Given the fact that he is now reporting that he is homeless and he has a pending custody court case we may have some leverage to try and help him obtain the treatment that he needs.          The patient will seek out appropriate emergency services should that become necessary.  I will make myself available if any questions, concerns, or problems arise.       Hans Alvarez MD

## 2021-06-14 NOTE — TELEPHONE ENCOUNTER
[id number] - [place], [place] - [address] AT [facility] [address] Phone:  [phone]   Fax:  [phone]        Patient needs refill of Adderall 30mg and 10mg    0 doses left    Next appt: 1/13/2021 12pm      Thank you!

## 2021-06-14 NOTE — PATIENT INSTRUCTIONS - HE
I will make no medication changes at this time.  The patient is now agreeing to chemical dependency assessment and treatment.  Please arrange for a chemical dependency intake, possibly a rule 25 if that is needed.  The patient will return to this clinic in 4 weeks for a medication check.  Also, the patient is reporting he is currently homeless.  He may need some resources regarding this as well.  Please let me know if you need anything else for me.

## 2021-06-14 NOTE — TELEPHONE ENCOUNTER
Patient has appointment tomorrow with Dr Alvarez and Adderall refill will be addressed at that time.  He has enough to get to January 14th.

## 2021-06-14 NOTE — PROGRESS NOTES
This video/telephone visit will be conducted via a call between you and your physician/provider. We have found that certain health care needs can be provided without the need for an in-person physical exam. This service lets us provide the care you need with a video /telephone conversation. If a prescription is necessary we can send it directly to your pharmacy. If lab work is needed we can place an order for that and you can then stop by our lab to have the test done at a later time.    Just as we bill insurance for in-person visits, we also bill insurance for video/telephone visits. If you have questions about your insurance coverage, we recommend that you speak with your insurance company.    Patient has given verbal consent for video/Telephone visit? Yes  Patient would like telephone visit, please call:  [phone]  SONIA/DORITA CAM CMA    Patient verified allergies, medications and pharmacy via phone. Patient states he is ready for visit.    Unable to review [place] , awaiting access from provider.

## 2021-06-14 NOTE — PROGRESS NOTES
"  Assessment/Plan:     Patient presents for follow-up on anxiety and depression.  His PHQ 9 today is a 4.  He is interested in Ativan.  I discussed the risks of benzodiazepines and he is willing to try citalopram instead.  He was previously on a dose of 40 mg that has been discontinued on this for quite a while.  He is comfortable starting at 20 mg today.  We discussed therapy the patient has not yet interested.  Patient's anxiety may also benefit from the propranolol I prescribed for his hypertension.  Patient does have a tendinitis of the right arm secondary to repetitive motions at work.  Discussed an ergonomics team intervention but the patient is concerned that if he \"rocks the boat\" that he will be terminated.  Instead, we will send him over to physical therapy/Occupational Therapy for evaluation of the movements used in his job to see if there are ways of causing his arm less strain.  For smoking cessation, will initiate on Chantix as patient is no longer on Wellbutrin.  Discussed suicidal ideation, and the risks, patient will respond appropriately if he experiences any of these symptoms.  Originally, we were going to discuss weight at this appointment.  Patient was to have lost 1 pound since the last time I saw him.  He gained 10 instead.  We will discussed that at the next appointment with more time.    Problem List Items Addressed This Visit        ENT/CARD/PULM/ENDO Problems    Essential hypertension    Relevant Medications    propranolol (INDERAL) 10 MG tablet       Other    Anxiety    Relevant Medications    citalopram (CELEXA) 20 MG tablet    propranolol (INDERAL) 10 MG tablet    Recurrent major depressive disorder    Relevant Medications    citalopram (CELEXA) 20 MG tablet    Tobacco abuse    Relevant Medications    varenicline (CHANTIX STARTING MONTH BOX) 0.5 mg (11)- 1 mg (42) tablet      Other Visit Diagnoses     Tendonitis    -  Primary    Relevant Orders    Ambulatory referral to PT/OT    "       AVS printed and given to patient.  Return to clinic in 2 weeks.    The following are part of a depression follow up plan for the patient:  mental health care management    Total time spent with patient was 20 minutes with greater than 50% spent in face-to-face counseling regarding the above plan.    This note has been dictated using voice recognition software. Any grammatical or context distortions are unintentional and inherent to the the software.     Shy Kamara MD  Family Medicine [facility]      Subjective:      Chu Pimentel is a 32 y.o. male who presents to clinic for follow-up from last appointment.    Patient has many concerns today.  The first is that he got a job that he really likes at a [employer] where he does the same repetitive motion all day long for approximately 8 hours.  He is experiencing some pain in his right elbow.  Patient is right-handed.  He has tried to change the laterality of the hand he uses for work but it is not working.  He is experiencing no pain in the left arm.  He has never had pain like this before, although he was a pitcher previously and feels as though his right arm might be slightly less healthy than his left.    She is on Wellbutrin at the last appointment to help with his anxiety and depression as well as smoking cessation.  Patient spends no significant side effects but also no improvement in his smoking habits.  He self discontinued the Wellbutrin approximately 2 weeks ago.  He is interested in Chantix as he is very aggressive about wanting to quit smoking.    Patient is not interested in therapy at this time but he would consider in the future.  He is interested in getting back on the medication that worked for him for anxiety depression in the past which was citalopram 40 mg.  His PHQ 9 today is a 4 and he specifically denies suicidal ideation.      Past Medical History, Family History, and Social History reviewed.     Review of  "systems is as stated in HPI.  Patient endorses: fever, wheezing, joint pain, and headaches  The remainder of the 10 system review is otherwise negative.    Objective:     /86  Pulse 82  Ht 5' 10\" (1.778 m)  Wt (!) 298 lb (135.2 kg)  SpO2 97%  BMI 42.76 kg/m2 Body mass index is 42.76 kg/(m^2).    Gen: Alert, NAD, appears stated age, normal hygiene   Psych: no apparent hallucinations or delusions, no pressured speech; alert, oriented x3, no thickened pressured speec      Current Outpatient Prescriptions on File Prior to Visit   Medication Sig Dispense Refill     eletriptan (RELPAX) 20 MG tablet Take 1 tablet (20 mg total) by mouth as needed for migraine. may repeat in 2 hours if necessary 30 tablet 3     [DISCONTINUED] buPROPion (WELLBUTRIN XL) 150 MG 24 hr tablet Take 1 tablet (150 mg total) by mouth daily. 30 tablet 1     No current facility-administered medications on file prior to visit.                "

## 2021-06-16 PROBLEM — F33.9 RECURRENT MAJOR DEPRESSIVE DISORDER (H): Status: ACTIVE | Noted: 2017-10-23

## 2021-06-16 PROBLEM — I10 ESSENTIAL HYPERTENSION: Status: ACTIVE | Noted: 2017-10-23

## 2021-06-16 PROBLEM — F41.9 ANXIETY: Status: ACTIVE | Noted: 2017-10-23

## 2021-06-16 PROBLEM — G43.009 MIGRAINE WITHOUT AURA: Status: ACTIVE | Noted: 2017-10-23

## 2021-06-16 PROBLEM — Z72.0 TOBACCO ABUSE: Status: ACTIVE | Noted: 2017-10-23

## 2021-06-16 PROBLEM — F90.0 ATTENTION DEFICIT HYPERACTIVITY DISORDER (ADHD), PREDOMINANTLY INATTENTIVE TYPE: Status: ACTIVE | Noted: 2020-07-16

## 2021-06-16 PROBLEM — E66.813 CLASS 3 SEVERE OBESITY DUE TO EXCESS CALORIES WITH SERIOUS COMORBIDITY AND BODY MASS INDEX (BMI) OF 40.0 TO 44.9 IN ADULT (H): Status: ACTIVE | Noted: 2019-03-12

## 2021-06-16 PROBLEM — F99 INSOMNIA DUE TO OTHER MENTAL DISORDER: Status: ACTIVE | Noted: 2020-04-27

## 2021-06-16 PROBLEM — E66.01 CLASS 3 SEVERE OBESITY DUE TO EXCESS CALORIES WITH SERIOUS COMORBIDITY AND BODY MASS INDEX (BMI) OF 40.0 TO 44.9 IN ADULT (H): Status: ACTIVE | Noted: 2019-03-12

## 2021-06-16 PROBLEM — F51.05 INSOMNIA DUE TO OTHER MENTAL DISORDER: Status: ACTIVE | Noted: 2020-04-27

## 2021-06-17 NOTE — TELEPHONE ENCOUNTER
Telephone Encounter by Janessa Man, RN at 10/14/2020 12:39 PM     Author: Janessa Man, RN Service: -- Author Type: Registered Nurse    Filed: 10/14/2020 12:40 PM Encounter Date: 10/12/2020 Status: Signed    : Janessa Man RN (Registered Nurse)       Me         10/14/20 12:37 PM  Note     Patients Adderall script was sent to [place] on 10-12-20, bu he needs them sent to [facility] in [place] which is in his profile.       Chu Pimentel Robert J, MD          10/14/20 12:32 PM  [facility] can you send my prescription to [facility] in [place] in [facility] and I'll pick it up when I'm back in town videos where that bear. Clean  This encounter is not signed. The conversation may still be ongoing.  Additional Documentation

## 2021-06-19 NOTE — LETTER
Letter by Riri Ortiz CNP at      Author: Rrii Ortiz CNP Service: -- Author Type: --    Filed:  Encounter Date: 3/17/2019 Status: (Other)         Chu Pimentel  [address] [place] [address]             March 17, 2019         Dear Mr. Pimentel,    Below are the results from your recent visit:    Resulted Orders   Lipid Cascade   Result Value Ref Range    Cholesterol 145 <=199 mg/dL    Triglycerides 267 (H) <=149 mg/dL    HDL Cholesterol 34 (L) >=40 mg/dL    LDL Calculated 58 <=129 mg/dL    Patient Fasting > 8hrs? Yes    Basic Metabolic Panel   Result Value Ref Range    Sodium 139 136 - 145 mmol/L    Potassium 4.1 3.5 - 5.0 mmol/L    Chloride 106 98 - 107 mmol/L    CO2 25 22 - 31 mmol/L    Anion Gap, Calculation 8 5 - 18 mmol/L    Glucose 103 70 - 125 mg/dL    Calcium 9.5 8.5 - 10.5 mg/dL    BUN 14 8 - 22 mg/dL    Creatinine 0.83 0.70 - 1.30 mg/dL    GFR MDRD Af Amer >60 >60 mL/min/1.73m2    GFR MDRD Non Af Amer >60 >60 mL/min/1.73m2    Narrative    Fasting Glucose reference range is 70-99 mg/dL per  American Diabetes Association (ADA) guidelines.       Your fasting blood sugar is mildly elevated.   I recommend you consume a healthy diet (avoid white breads, refined carbohydrates, sweets) and exercise.      Your triglycerides are elevated.  This can be due to not completely fasting prior to the lab check, lack of healthy diet and exercise, and alcohol use.  I recommend you consume a healthy low-fat diet (avoid fast food, fried foods, processed foods, and butter) and exercise.  I recommend avoiding alcohol.         Please call with questions or contact us using [employer].    Sincerely,        Electronically signed by Riri Ortiz CNP

## 2021-06-19 NOTE — LETTER
Letter by Riri Ortiz CNP at      Author: Riri Ortiz CNP Service: -- Author Type: --    Filed:  Encounter Date: 3/27/2019 Status: (Other)        [facility] MEDICINE/OB  [address]  [place] [address]  [phone]         Chu Pimentel  [address] [address]        03/27/19    Dear Chu Pimentel,     At [facility] we care about your health and well-being. Your primary care provider is committed to ensuring you receive high quality care and has chosen a network of specialists to assist in providing that care. Recently Riri Ortiz CNP referred you to [facility] for specialty care. They have made an attempt to connect with you to assist with scheduling, however they have been unable to reach you by phone.       It is important to your overall health to follow through with the recommendation from your provider. Please call [facility] ([phone]) at your earliest convenience for assistance in scheduling an appointment.  If you have already scheduled this appointment, please disregard this notice.  Thank you for choosing [facility] for your healthcare needs.       Sincerely,     Riri Ortiz CNP /   [facility] Specialty Scheduling

## 2021-06-19 NOTE — LETTER
Letter by Shy Kamara MD at      Author: Shy Kamara MD Service: -- Author Type: --    Filed:  Encounter Date: 11/5/2019 Status: Signed         November 5, 2019     Patient: Chu Pimentel   YOB: 1985   Date of Visit: 11/5/2019       To Whom It May Concern:    It is my medical opinion that Chu Pimentel should remain out of work until symptoms resolve.    If you have any questions or concerns, please don't hesitate to call.    Sincerely,        Electronically signed by Shy Kamara MD

## 2021-06-24 NOTE — PROGRESS NOTES
Assessment and Plan:     1. Routine general medical examination at a health care facility  Discussed consuming a healthy diet and exercising.  He declines Tdap vaccine.      2. Lipid screening  - Lipid Cascade    3. Anxiety  He continues Citalopram and Gabapentin.  Will refer to psychiatry and counseling.   - Ambulatory referral to Psychiatry  - Ambulatory referral to Psychology    4. Mild episode of recurrent major depressive disorder (H)  He continues Citalopram and Gabapentin.  Will refer to psychiatry and counseling.   - Ambulatory referral to Psychiatry  - Ambulatory referral to Psychology    5. Essential hypertension  This is controlled with Amlodipine.      6. Methamphetamine abuse in remission (H)  He has been sober for 42 days.  I congratulated him on this.  Will refer to psychiatry and counseling.   - Ambulatory referral to Psychiatry  - Ambulatory referral to Psychology    7. Smoking  Offered smoking cessation options, but he declines.    I have counseled the patient for tobacco cessation and the follow up will occur  at the next visit.    8. Morbid obesity (H)  The following high BMI interventions were performed this visit: encouragement to exercise and lifestyle education regarding diet    9. Medication management  - Basic Metabolic Panel    The patient is content with the plan and will follow-up in 6 months for medication management or sooner with any further concerns.       Subjective:     Chu is a 33 y.o. male presenting to the clinic for a male physical and other concerns.  Patient is single but lives with his ex-wife.  He has 2 children ages 6 (daughter) and 4 (son).   He has no concerns for STDs.  He is trying to consume a healthy diet.  He denies exercise.  He recently completed treatment for methamphetamine abuse at [facility] in [place], [place].  He used methamphetamines daily for 4 years.  He has been sober for 42 days.  He has been attending AA meetings.  He has been  prescribed new medications.  He is taking trazodone nightly as needed for sleep.  He has been diagnosed with anxiety and depression which is now controlled.  He is taking citalopram 40 mg daily and gabapentin 3 times daily.  He is unsure of the exact dose of gabapentin but does plan on calling the clinic to let us know.  He is interested in seeing a psychiatrist and a marriage counselor.  He and his ex-wife are going to try to improve their relationship.  He does have a  through [facility].  He has been diagnosed with hypertension and is taking amlodipine 10 mg daily.  He denies chest pain, shortness of breath with exertion, edema, orthopnea, syncope.    Review of Systems: A complete 14 point review of systems was obtained and is negative or as stated in the history of present illness.    Social History     Socioeconomic History     Marital status:      Spouse name: Not on file     Number of children: Not on file     Years of education: Not on file     Highest education level: Not on file   Occupational History     Not on file   Social Needs     Financial resource strain: Not on file     Food insecurity:     Worry: Not on file     Inability: Not on file     Transportation needs:     Medical: Not on file     Non-medical: Not on file   Tobacco Use     Smoking status: Current Every Day Smoker     Packs/day: 0.50     Smokeless tobacco: Never Used   Substance and Sexual Activity     Alcohol use: Yes     Comment: four-five/month     Drug use: No     Comment: hx of meth use      Sexual activity: Not on file     Comment: single    Lifestyle     Physical activity:     Days per week: Not on file     Minutes per session: Not on file     Stress: Not on file   Relationships     Social connections:     Talks on phone: Not on file     Gets together: Not on file     Attends Protestant service: Not on file     Active member of club or organization: Not on file     Attends meetings of clubs or organizations:  Not on file     Relationship status: Not on file     Intimate partner violence:     Fear of current or ex partner: Not on file     Emotionally abused: Not on file     Physically abused: Not on file     Forced sexual activity: Not on file   Other Topics Concern     Not on file   Social History Narrative     Not on file       Active Ambulatory Problems     Diagnosis Date Noted     Essential hypertension 10/23/2017     Anxiety 10/23/2017     Recurrent major depressive disorder (H) 10/23/2017     Migraine without aura 10/23/2017     Tobacco abuse 10/23/2017     Resolved Ambulatory Problems     Diagnosis Date Noted     No Resolved Ambulatory Problems     Past Medical History:   Diagnosis Date     Hypertension        Family History   Problem Relation Age of Onset     Hypertension Mother      Hypertension Father        Objective:     /80   Pulse 65   Wt (!) 308 lb (139.7 kg)   SpO2 95%   BMI 44.19 kg/m      General Appearance:    Alert, cooperative, no distress, appears stated age   Head:    Normocephalic, without obvious abnormality, atraumatic   Eyes:    PERRL, conjunctiva/corneas clear, EOM's intact, fundi     benign, both eyes        Ears:    Normal TM's and external ear canals, both ears   Nose:   Nares normal, septum midline, mucosa normal, no drainage    or sinus tenderness   Throat:   Lips, mucosa, and tongue normal; teeth and gums normal   Neck:   Supple, symmetrical, trachea midline, no adenopathy;        thyroid:  No enlargement/tenderness/nodules; no carotid    bruit or JVD   Back:     Symmetric, no curvature, ROM normal, no CVA tenderness   Lungs:     Clear to auscultation bilaterally, respirations unlabored   Chest wall:    No tenderness or deformity   Heart:    Regular rate and rhythm, S1 and S2 normal, no murmur, rub    or gallop   Abdomen:     Soft, non-tender, bowel sounds active all four quadrants,     no masses, no organomegaly   Genitalia:    Deferred per patient   Rectal:    deferred    Extremities:   Extremities normal, atraumatic, no cyanosis or edema   Pulses:   2+ and symmetric all extremities   Skin:   Skin color, texture, turgor normal, no rashes or lesions   Lymph nodes:   Cervical, supraclavicular, and axillary nodes normal   Neurologic:   CNII-XII intact. Normal strength, sensation and reflexes       throughout

## 2021-06-27 ENCOUNTER — HEALTH MAINTENANCE LETTER (OUTPATIENT)
Age: 36
End: 2021-06-27

## 2021-06-29 NOTE — PROGRESS NOTES
Progress Notes by Rina Baird LPN at 8/26/2020 12:00 PM     Author: Rina Baird LPN Service: -- Author Type: Licensed Nurse    Filed: 8/26/2020  2:52 PM Encounter Date: 8/26/2020 Status: Signed    : Rina Baird LPN (Licensed Nurse)       This video/telephone visit will be conducted via a call between you and your physician/provider. We have found that certain health care needs can be provided without the need for an in-person physical exam. This service lets us provide the care you need with a video /telephone conversation. If a prescription is necessary we can send it directly to your pharmacy. If lab work is needed we can place an order for that and you can then stop by our lab to have the test done at a later time.   Just as we bill insurance for in-person visits, we also bill insurance for video/telephone visits. If you have questions about your insurance coverage, we recommend that you speak with your insurance company.   Patient has given verbal consent for video/Telephone visit? yes  Patient would like the video visit invitation sent by: Text to cell phone: NA or Send to email: [employer] [facility]  SONIA/LPN: AD, LPN  Patient verified allergies, medications and pharmacy via [employer].  Patient states he is ready for visit.

## 2021-06-29 NOTE — PROGRESS NOTES
Progress Notes by Rina Baird LPN at 10/7/2020  2:00 PM     Author: Rina Baird LPN Service: -- Author Type: Licensed Nurse    Filed: 10/7/2020  2:42 PM Encounter Date: 10/7/2020 Status: Signed    : Rina Baird LPN (Licensed Nurse)       This video/telephone visit will be conducted via a call between you and your physician/provider. We have found that certain health care needs can be provided without the need for an in-person physical exam. This service lets us provide the care you need with a video /telephone conversation. If a prescription is necessary we can send it directly to your pharmacy. If lab work is needed we can place an order for that and you can then stop by our lab to have the test done at a later time.   Just as we bill insurance for in-person visits, we also bill insurance for video/telephone visits. If you have questions about your insurance coverage, we recommend that you speak with your insurance company.   Patient has given verbal consent for video/Telephone visit? yes  Patient would like the video visit invitation sent by: Text to cell phone: MAN or Send to email: [employer] : Kendall PATEL LPN    Patient verified allergies, medications and pharmacy via phone.  Patient states he is ready for visit.

## 2021-06-29 NOTE — PROGRESS NOTES
Progress Notes by Niyah Patricio CMA at 10/28/2020  3:00 PM     Author: Niyah Patricio CMA Service: -- Author Type: Certified Medical Assistant    Filed: 10/28/2020  3:27 PM Encounter Date: 10/28/2020 Status: Signed    : Niyah Patricio CMA (Certified Medical Assistant)       This video/telephone visit will be conducted via a call between you and your physician/provider. We have found that certain health care needs can be provided without the need for an in-person physical exam. This service lets us provide the care you need with a video /telephone conversation. If a prescription is necessary we can send it directly to your pharmacy. If lab work is needed we can place an order for that and you can then stop by our lab to have the test done at a later time.   Just as we bill insurance for in-person visits, we also bill insurance for video/telephone visits. If you have questions about your insurance coverage, we recommend that you speak with your insurance company.   Patient has given verbal consent for video/Telephone visit? Yes   Patient would like telephone visit, please call: [phone]  Niyah CRUZ CMA   Birthday today     Patient verified allergies, medications and pharmacy via phone.  Patient states he is ready for visit.  [place]  was reviewed prior to seeing patient today. See below for embedded report.

## 2021-07-03 NOTE — ADDENDUM NOTE
Addendum Note by Dago Boo, RN at 9/8/2020 11:41 AM     Author: Dago Boo RN Service: Behavioral Author Type: Registered Nurse    Filed: 9/8/2020 11:41 AM Encounter Date: 9/8/2020 Status: Signed    : Dago Boo RN (Registered Nurse)    Addended by: DAGO BOO on: 9/8/2020 11:41 AM        Modules accepted: Orders

## 2021-07-03 NOTE — ADDENDUM NOTE
Addendum Note by Dago Boo RN at 9/8/2020 11:46 AM     Author: Dago Boo RN Service: Behavioral Author Type: Registered Nurse    Filed: 9/8/2020 11:46 AM Encounter Date: 9/8/2020 Status: Signed    : Dago Boo RN (Registered Nurse)    Addended by: DAGO BOO on: 9/8/2020 11:46 AM        Modules accepted: Orders

## 2021-07-03 NOTE — ADDENDUM NOTE
Addendum Note by Shy Burns MD at 11/5/2019  1:30 PM     Author: Shy Burns MD Service: -- Author Type: Physician    Filed: 11/5/2019  2:24 PM Encounter Date: 11/5/2019 Status: Signed    : Shy Burns MD (Physician)    Addended by: SHY BURNS on: 11/5/2019 02:24 PM        Modules accepted: Orders

## 2021-07-03 NOTE — ADDENDUM NOTE
Addendum Note by Dago Boo, RN at 9/8/2020 11:43 AM     Author: Dago Boo RN Service: Behavioral Author Type: Registered Nurse    Filed: 9/8/2020 11:43 AM Encounter Date: 9/8/2020 Status: Signed    : Dago Boo RN (Registered Nurse)    Addended by: DAGO BOO on: 9/8/2020 11:43 AM        Modules accepted: Orders

## 2021-07-03 NOTE — ADDENDUM NOTE
Addendum Note by Zoila Powell RN at 10/15/2020 12:18 PM     Author: Zoila Powell RN Service: -- Author Type: Registered Nurse    Filed: 10/15/2020 12:18 PM Encounter Date: 10/12/2020 Status: Signed    : Zoila Powell RN (Registered Nurse)    Addended by: ZOILA POWELL on: 10/15/2020 12:18 PM        Modules accepted: Orders

## 2021-07-15 ENCOUNTER — VIRTUAL VISIT (OUTPATIENT)
Dept: BEHAVIORAL HEALTH | Facility: CLINIC | Age: 36
End: 2021-07-15
Payer: MEDICAID

## 2021-07-15 DIAGNOSIS — Z86.59 HX OF ATTENTION DEFICIT HYPERACTIVITY DISORDER: ICD-10-CM

## 2021-07-15 DIAGNOSIS — F15.10 METHAMPHETAMINE ABUSE (H): Primary | ICD-10-CM

## 2021-07-15 PROCEDURE — 99207 PR NO CHARGE LOS: CPT | Performed by: NURSE PRACTITIONER

## 2021-07-15 ASSESSMENT — ANXIETY QUESTIONNAIRES
1. FEELING NERVOUS, ANXIOUS, OR ON EDGE: SEVERAL DAYS
3. WORRYING TOO MUCH ABOUT DIFFERENT THINGS: SEVERAL DAYS
5. BEING SO RESTLESS THAT IT IS HARD TO SIT STILL: SEVERAL DAYS
IF YOU CHECKED OFF ANY PROBLEMS ON THIS QUESTIONNAIRE, HOW DIFFICULT HAVE THESE PROBLEMS MADE IT FOR YOU TO DO YOUR WORK, TAKE CARE OF THINGS AT HOME, OR GET ALONG WITH OTHER PEOPLE: SOMEWHAT DIFFICULT
GAD7 TOTAL SCORE: 7
7. FEELING AFRAID AS IF SOMETHING AWFUL MIGHT HAPPEN: SEVERAL DAYS
4. TROUBLE RELAXING: SEVERAL DAYS
2. NOT BEING ABLE TO STOP OR CONTROL WORRYING: SEVERAL DAYS
6. BECOMING EASILY ANNOYED OR IRRITABLE: SEVERAL DAYS

## 2021-07-15 ASSESSMENT — PATIENT HEALTH QUESTIONNAIRE - PHQ9: SUM OF ALL RESPONSES TO PHQ QUESTIONS 1-9: 5

## 2021-07-15 NOTE — NURSING NOTE
This video/telephone visit will be conducted via a call between you and your physician/provider. We have found that certain health care needs can be provided without the need for an in-person physical exam. This service lets us provide the care you need with a video /telephone conversation. If a prescription is necessary we can send it directly to your pharmacy. If lab work is needed we can place an order for that and you can then stop by our lab to have the test done at a later time.    Just as we bill insurance for in-person visits, we also bill insurance for video/telephone visits. If you have questions about your insurance coverage, we recommend that you speak with your insurance company.    Patient has given verbal consent for video/Telephone visit? yes  Patient would like the video visit invitation sent by: Text to cell phone: [phone] Send to email: MAN or SONJA CALL to:  MAN SCOTT[employer]DORITA : Kendall PATEL LPN    Patient verified allergies, medications and pharmacy via phone. PHQ : 5, Mood: 2 and CULLEN: 7 done verbally with writer. Patient states he  is ready for visit.

## 2021-07-15 NOTE — PROGRESS NOTES
Met with patient briefly for less than 5 minutes.  Patient already has an established psychiatrist in our clinic Dr. Alvarez however he claims that his insurance does not cover him.  Patient last seen by primary care provider on 6/4/2051 per chart review he was  demanding stimulants and was  angry with provider, for not providing a prescription.   Patient last seen by Dr. 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  It appears from primary care provider's note from 6/24/2021 the patient never followed through with recommendations and never returned to see Dr Alvarez and was upset at primary care provider suggesting that he should return to Dr. Alvarez .  I was unable to complete patient's assessment today but however going by chart review I do not believe he is a good candidate for stimulant treatment given his history of meth use and given that he is seeking stimulants from different providers.  Ideally patient would be better served by one provider. Patient would  Benefit  from a referral for addiction medicine and or inpatient /outpatient CD treatment therefore I will not be taking over his care as I do not recommend stimulants at this time which is primarily what patient is looking for. I was unable to complete intake assessment as patient left  within 5 minutes of appointment time.

## 2021-07-16 ASSESSMENT — ANXIETY QUESTIONNAIRES: GAD7 TOTAL SCORE: 7

## 2021-08-02 ENCOUNTER — MYC MEDICAL ADVICE (OUTPATIENT)
Dept: FAMILY MEDICINE | Facility: CLINIC | Age: 36
End: 2021-08-02

## 2021-08-02 DIAGNOSIS — F90.0 ATTENTION DEFICIT HYPERACTIVITY DISORDER (ADHD), PREDOMINANTLY INATTENTIVE TYPE: ICD-10-CM

## 2021-08-02 RX ORDER — DEXTROAMPHETAMINE SACCHARATE, AMPHETAMINE ASPARTATE, DEXTROAMPHETAMINE SULFATE AND AMPHETAMINE SULFATE 7.5; 7.5; 7.5; 7.5 MG/1; MG/1; MG/1; MG/1
30 TABLET ORAL DAILY
Qty: 30 TABLET | Refills: 0 | OUTPATIENT
Start: 2021-08-02

## 2021-08-02 RX ORDER — DEXTROAMPHETAMINE SACCHARATE, AMPHETAMINE ASPARTATE, DEXTROAMPHETAMINE SULFATE AND AMPHETAMINE SULFATE 2.5; 2.5; 2.5; 2.5 MG/1; MG/1; MG/1; MG/1
10 TABLET ORAL DAILY
Qty: 30 TABLET | Refills: 0 | OUTPATIENT
Start: 2021-08-02

## 2021-08-03 PROBLEM — F15.11 METHAMPHETAMINE ABUSE IN REMISSION (H): Status: RESOLVED | Noted: 2019-03-12 | Resolved: 2020-07-16

## 2021-08-26 ENCOUNTER — TELEPHONE (OUTPATIENT)
Dept: BEHAVIORAL HEALTH | Facility: CLINIC | Age: 36
End: 2021-08-26

## 2021-08-26 ENCOUNTER — MYC MEDICAL ADVICE (OUTPATIENT)
Dept: FAMILY MEDICINE | Facility: CLINIC | Age: 36
End: 2021-08-26

## 2021-08-26 DIAGNOSIS — Z11.52 ENCOUNTER FOR SCREENING LABORATORY TESTING FOR COVID-19 VIRUS: Primary | ICD-10-CM

## 2021-08-26 NOTE — TELEPHONE ENCOUNTER
Spoke to patient; he states he was pressured to get the vaccine and is now hesitant to get the second. He states he had covid already and before he gets the second dose, he wants to know how strong his antibody levels are.     Lab appt scheduled 8/31. Please sign pended lab orders.

## 2021-08-26 NOTE — TELEPHONE ENCOUNTER
Returned call to patient, last refill for Adderall 8/5/21, reviewed with patient that it is too early for refill. He says he has enough to get to 9/3/21, he will request meds early next week.    Instructed to get paperwork to clinic so request for case management services can be facilitated. Patient going to try to send it through [employer] or will check on dropping it off. Instructed on calling to arrange that.

## 2021-08-26 NOTE — TELEPHONE ENCOUNTER
Patient called upset that we never called for his appointment yesterday- writer let him know there was no appointment yesterday but there is one for October 25.    Patient needs fill on Adderall- patient has 2 doses left    [id number] [place], [place] - [address]  AT [facility] [facility]  [phone]    Patient has paperwork to get case management services and housing started. How does he send this paperwork?          Please reach out [phone]    Next appt 10/25

## 2021-08-27 ENCOUNTER — TELEPHONE (OUTPATIENT)
Dept: BEHAVIORAL HEALTH | Facility: CLINIC | Age: 36
End: 2021-08-27

## 2021-08-27 NOTE — TELEPHONE ENCOUNTER
RECEIVED D.A. REQUEST FOR CASE MANAGEMENT THROUGH [facility].  EMAILED TO [employer] AND PLACED IN [address] MAILBOX

## 2021-08-31 ENCOUNTER — LAB (OUTPATIENT)
Dept: LAB | Facility: CLINIC | Age: 36
End: 2021-08-31
Payer: MEDICAID

## 2021-08-31 DIAGNOSIS — Z11.52 ENCOUNTER FOR SCREENING LABORATORY TESTING FOR COVID-19 VIRUS: ICD-10-CM

## 2021-08-31 PROCEDURE — 99000 SPECIMEN HANDLING OFFICE-LAB: CPT | Performed by: FAMILY MEDICINE

## 2021-08-31 PROCEDURE — 36415 COLL VENOUS BLD VENIPUNCTURE: CPT | Performed by: FAMILY MEDICINE

## 2021-08-31 PROCEDURE — 86769 SARS-COV-2 COVID-19 ANTIBODY: CPT | Performed by: FAMILY MEDICINE

## 2021-09-01 ENCOUNTER — MYC REFILL (OUTPATIENT)
Dept: NEUROLOGY | Facility: CLINIC | Age: 36
End: 2021-09-01

## 2021-09-01 DIAGNOSIS — F90.0 ATTENTION DEFICIT HYPERACTIVITY DISORDER (ADHD), PREDOMINANTLY INATTENTIVE TYPE: ICD-10-CM

## 2021-09-02 LAB
SARS-COV-2 AB SERPL IA-ACNC: >250 U/ML
SARS-COV-2 AB SERPL QL IA: POSITIVE

## 2021-09-02 RX ORDER — DEXTROAMPHETAMINE SACCHARATE, AMPHETAMINE ASPARTATE, DEXTROAMPHETAMINE SULFATE AND AMPHETAMINE SULFATE 2.5; 2.5; 2.5; 2.5 MG/1; MG/1; MG/1; MG/1
10 TABLET ORAL DAILY
Qty: 30 TABLET | Refills: 0 | Status: SHIPPED | OUTPATIENT
Start: 2021-09-02 | End: 2023-08-26

## 2021-09-02 RX ORDER — DEXTROAMPHETAMINE SACCHARATE, AMPHETAMINE ASPARTATE, DEXTROAMPHETAMINE SULFATE AND AMPHETAMINE SULFATE 7.5; 7.5; 7.5; 7.5 MG/1; MG/1; MG/1; MG/1
30 TABLET ORAL DAILY
Qty: 30 TABLET | Refills: 0 | Status: SHIPPED | OUTPATIENT
Start: 2021-09-02 | End: 2023-08-26

## 2021-09-02 NOTE — TELEPHONE ENCOUNTER
Date of Last Office Visit: 7/15/2021 with Dolores HALE  Date of Next Office Visit: 10/25/2021  No shows since last visit: none  Cancellations since last visit: none    Medication requested: Adderall 10 and 30 mg tablets Date last ordered: 8/5/2021 Qty: 30 Refills: 0     Review of [place] ?: RN has no access to provider's       Lapse in medication adherence greater than 5 days?: no  If yes, call patient and gather details: no  Medication refill request verified as identical to current order?: yes  Result of Last DAM, VPA, Li+ Level, CBC, or Carbamazepine Level (at or since last visit): N/A    []Medication refilled per  Medication Refill in Ambulatory Care  policy.  [x]Medication unable to be refilled by RN due to criteria not met as indicated below:    []Eligibility - not seen in the last year   []Supervision - no future appointment   []Compliance - no shows, cancellations or lapse in therapy   []Verification - order discrepancy   [x]Controlled medication   [x]Medication not included in policy   []90-day supply request   []Other

## 2021-09-16 ENCOUNTER — VIRTUAL VISIT (OUTPATIENT)
Dept: FAMILY MEDICINE | Facility: CLINIC | Age: 36
End: 2021-09-16
Payer: MEDICAID

## 2021-09-16 DIAGNOSIS — I10 ESSENTIAL HYPERTENSION: Primary | ICD-10-CM

## 2021-09-16 PROBLEM — F41.1 GAD (GENERALIZED ANXIETY DISORDER): Status: ACTIVE | Noted: 2021-02-17

## 2021-09-16 PROBLEM — F33.1 MODERATE EPISODE OF RECURRENT MAJOR DEPRESSIVE DISORDER (H): Status: ACTIVE | Noted: 2021-02-17

## 2021-09-16 PROCEDURE — 99441 PR PHYSICIAN TELEPHONE EVALUATION 5-10 MIN: CPT | Performed by: FAMILY MEDICINE

## 2021-09-16 RX ORDER — ADHESIVE BANDAGE 3/4"
1 BANDAGE TOPICAL DAILY
Qty: 1 EACH | Refills: 0 | Status: SHIPPED | OUTPATIENT
Start: 2021-09-16 | End: 2021-10-01

## 2021-09-16 NOTE — PROGRESS NOTES
Chu is a 35 year old who is being evaluated via a billable telephone visit.      What phone number would you like to be contacted at? [phone]  How would you like to obtain your AVS? MyChart    Assessment & Plan     Essential hypertension  Recommended no changes in medication as it is unclear what his home pressures are. Recommend obtaining blood pressure cuff, measuring values x 7 days and messaging me so we can determine appropriate med management.  - Blood Pressure Monitoring (BLOOD PRESSURE CUFF) MISC  Dispense: 1 each; Refill: 0    Reassurance provided that both vaccines are proven efficaceous but I had no qualms having him take Moderna as the first dose and Pzifer as the 2nd. Letter provided.     Tobacco Cessation:   reports that he has been smoking. He has been smoking about 0.50 packs per day. He has never used smokeless tobacco.      No follow-ups on file.    Shy Kamara MD  [facility]   Chu is a 35 year old who presents for the following health issues     HPI     covid 19 vaccine questions:  - he got the moderna vaccine in July  - it wasn't approved at the time, hasn't gotten the 2nd dose  - he's been asking around    Blood pressure:  - patient does not have a blood pressure cuff at home  - currently on lisinopril 30 mg and 10 mg amlodipine          Objective           Vitals:  No vitals were obtained today due to virtual visit.    Physical Exam   N/a, telephone visit    Phone call duration: 8 minutes

## 2021-09-16 NOTE — LETTER
[facility]  [address]  [facility] [address]  [phone]          September 16, 2021    RE:  Chu Pimentel                                                                                                                                                       [address] [place] [address]            To whom it may concern:    Chu Pimentel is under my professional care. He received the Moderna vaccine originally but his 2nd dose should be pfizer, per my recommendation.      Sincerely,        Shy Kamara MD

## 2021-10-01 ENCOUNTER — MYC REFILL (OUTPATIENT)
Dept: NEUROLOGY | Facility: CLINIC | Age: 36
End: 2021-10-01

## 2021-10-01 ENCOUNTER — MYC REFILL (OUTPATIENT)
Dept: FAMILY MEDICINE | Facility: CLINIC | Age: 36
End: 2021-10-01

## 2021-10-01 DIAGNOSIS — I10 ESSENTIAL HYPERTENSION: ICD-10-CM

## 2021-10-01 DIAGNOSIS — F90.0 ATTENTION DEFICIT HYPERACTIVITY DISORDER (ADHD), PREDOMINANTLY INATTENTIVE TYPE: ICD-10-CM

## 2021-10-01 NOTE — TELEPHONE ENCOUNTER
Date of Last Office Visit: 1/21/21 (Kim) 7/15/21 (Elenita)  Date of Next Office Visit: 10/25/21 (Kim)  No shows since last visit: none  Cancellations since last visit: none    Disp Refills Start End ALYX    amphetamine-dextroamphetamine (ADDERALL) 10 MG tablet 30 tablet 0 9/2/2021  No     Disp Refills Start End ALYX    amphetamine-dextroamphetamine (ADDERALL) 30 MG tablet 30 tablet 0 9/2/2021  No          Review of [place] ?: yes  Medication last filled date: 9/2/21 Qty filled: 30  Other controlled substance on [place] ?: no    Lapse in medication adherence greater than 5 days?: no  If yes, call patient and gather details:   Medication refill request verified as identical to current order?: yes  Result of Last DAM, VPA, Li+ Level, CBC, or Carbamazepine Level (at or since last visit): N/A    Last visit treatment plan:   Plan:  At this time we will continue with the current medication regime.  Patient admits relapsing with meth.  It is unclear whether he ever would maintain significant sobriety.  He is now willing to be assessed and possibly enter a chemical dependency treatment program and I will refer him for that assessment.  He is not actively suicidal and is able to contract for safety.  Given the fact that he is now reporting that he is homeless and he has a pending custody court case we may have some leverage to try and help him obtain the treatment that he needs.         []Medication refilled per  Medication Refill in Ambulatory Care  policy.  [x]Medication unable to be refilled by RN due to criteria not met as indicated below:    []Eligibility - not seen in the last year   []Supervision - no future appointment   []Compliance - no shows, cancellations or lapse in therapy   []Verification - order discrepancy   [x]Controlled medication   []Medication not included in policy   []90-day supply request   []Other

## 2021-10-02 NOTE — TELEPHONE ENCOUNTER
I agree, thank you for the heads up. Let's not refill the prescription and encouraged him to complete chemical dependency treatment prior to consideration for restarting this medication.

## 2021-10-03 NOTE — TELEPHONE ENCOUNTER
Routing refill request to provider for review/approval because:  Drug not on the [facility] Refill Protocol    Last Written Prescription Date:  21  Last Fill Quantity: 1,  # refills: 0   Last office visit provider: 21    Requested Prescriptions   Pending Prescriptions Disp Refills     Blood Pressure Monitoring (BLOOD PRESSURE CUFF) MISC 1 each 0     Si Units daily       There is no refill protocol information for this order          Meli Graves RN 10/03/21 3:52 AM

## 2021-10-04 ENCOUNTER — TELEPHONE (OUTPATIENT)
Dept: NEUROLOGY | Facility: CLINIC | Age: 36
End: 2021-10-04

## 2021-10-04 RX ORDER — DEXTROAMPHETAMINE SACCHARATE, AMPHETAMINE ASPARTATE, DEXTROAMPHETAMINE SULFATE AND AMPHETAMINE SULFATE 7.5; 7.5; 7.5; 7.5 MG/1; MG/1; MG/1; MG/1
30 TABLET ORAL DAILY
OUTPATIENT
Start: 2021-10-04

## 2021-10-04 RX ORDER — DEXTROAMPHETAMINE SACCHARATE, AMPHETAMINE ASPARTATE, DEXTROAMPHETAMINE SULFATE AND AMPHETAMINE SULFATE 2.5; 2.5; 2.5; 2.5 MG/1; MG/1; MG/1; MG/1
10 TABLET ORAL DAILY
OUTPATIENT
Start: 2021-10-04

## 2021-10-04 RX ORDER — ADHESIVE BANDAGE 3/4"
1 BANDAGE TOPICAL DAILY
Qty: 1 EACH | Refills: 0 | Status: SHIPPED | OUTPATIENT
Start: 2021-10-04 | End: 2023-08-26

## 2021-10-04 NOTE — TELEPHONE ENCOUNTER
Chu called in an was rather agitated about the fact that refills for his Adderall have been denied and he does not know why.  Writer reviewed chart afterward and found note about concerns with substance abuse and holding off on these refills until he considers treatment.  Could a nurse please call him and let him know this is the case so that he can understand?

## 2021-10-04 NOTE — TELEPHONE ENCOUNTER
Reason for Call:  Medication Refill    Detailed comments: Received call from patient following up on a refill for Adderall. The patient stated the prescription was denied by Dr. Alvarez and wants to know the reason why?    Phone Number Patient can be reached at: [phone]    Best Time: ASAP    Can we leave a detailed message on this number? Yes    Call taken on 10/4/2021 at 12:37 PM by Ramu Howard

## 2021-10-04 NOTE — TELEPHONE ENCOUNTER
This RN called pt re: refill request denial LVM detailing Dr. Alvarez's note as follows:  October 2, 2021    Hans Alvarez MD  to Sonia Lynn RN    RS    8:30 AM  Note     I agree, thank you for the heads up. Let's not refill the prescription and encouraged him to complete chemical dependency treatment prior to consideration for restarting this medication.        Wtr prompted him to call back if he had any further questions.    Jeff Larios RN on 10/4/2021 at 1:35 PM

## 2021-10-06 NOTE — TELEPHONE ENCOUNTER
Thank you for the update.  I would like to continue with the plan for the patient to begin chemical dependency treatment before we refill the med.  Thanks

## 2021-10-06 NOTE — TELEPHONE ENCOUNTER
"Patient called and was upset about not being prescribed adderall. Patient said that he is going to go [facility] out-patient soon and goes to [facility] meetings. When asked when he will start out-patient treatment patient said he does not know. Patient said that he has a hard time \"getting his life together\" without his medications and wanted Dr. Alvarez to know this.   "

## 2021-10-13 ENCOUNTER — TELEPHONE (OUTPATIENT)
Dept: NEUROLOGY | Facility: CLINIC | Age: 36
End: 2021-10-13

## 2021-10-13 NOTE — TELEPHONE ENCOUNTER
"This has already been addressed by the doctor and nurse.     Telephone    10/4/2021  [facility]   Hans Alvarez MD    Neurology  Refill Request    Reason for call       Conversation: Refill Request  (Newest Message First)  October 6, 2021    Hans Alvarez MD  to Sonia Lynn, RN    RS    4:14 PM  Note     Thank you for the update.  I would like to continue with the plan for the patient to begin chemical dependency treatment before we refill the med.  Thanks               LD    11:59 AM  Sonia Lynn, RN routed this conversation to Hans Alvarez MD Daigle, Leah, RN     LD    11:59 AM  Note     Patient called and was upset about not being prescribed adderall. Patient said that he is going to go [facility] out-patient soon and goes to [facility] meetings. When asked when he will start out-patient treatment patient said he does not know. Patient said that he has a hard time \"getting his life together\" without his medications and wanted Dr. Alvarez to know this.         October 4, 2021    Jeff Larios RN     CD    1:35 PM  Note     This RN called pt re: refill request denial LVM detailing Dr. Alvarez's note as follows:  October 2, 2021     Hans Alvarez MD  to Sonia Lynn RN    RS    8:30 AM  Note     I agree, thank you for the heads up. Let's not refill the prescription and encouraged him to complete chemical dependency treatment prior to consideration for restarting this medication.         Wtr prompted him to call back if he had any further questions.     Jeff Larios RN on 10/4/2021 at 1:35 PM                    CD    1:35 PM    Jeff Larios RN attempted to contact Chu Pimentel (Left Message)             JT    12:41 PM  Ramu Howard routed this conversation to [facility]       Ramu Howard     JT    12:40 PM  Note     Reason for Call:  Medication Refill     Detailed comments: Received call " from patient following up on a refill for Adderall. The patient stated the prescription was denied by Dr. Alvarez and wants to know the reason why?     Phone Number Patient can be reached at: [phone]     Best Time: ASAP     Can we leave a detailed message on this number? Yes     Call taken on 10/4/2021 at 12:37 PM by Ramu COTTER    12:10 PM  Angela Robb routed this conversation to [facility]       Angela Robb     KK    12:10 PM  Note     Chu called in an was rather agitated about the fact that refills for his Adderall have been denied and he does not know why.  Writer reviewed chart afterward and found note about concerns with substance abuse and holding off on these refills until he considers treatment.  Could a nurse please call him and let him know this is the case so that he can understand?

## 2021-10-13 NOTE — TELEPHONE ENCOUNTER
Reason for Call:  Other call back    Detailed comments: Pt called extremely upset that his Adderall was denied   Pt states he ocampo been through treatment he has never abused the adderall and states that he has not spoken directly to   Dr. Alvarez about this.   Pt does not feel it is right to have med's dc'd with out direct contact with Dr. Mcmullen.   Pt has F/U cassy   and is requesting call back from  re: above.     Phone Number Patient can be reached at: Home number on file [phone] (home)    Best Time: any    Can we leave a detailed message on this number? YES    Call taken on 10/13/2021 at 2:06 PM by Levon Cabrales

## 2021-10-14 NOTE — TELEPHONE ENCOUNTER
"Documented and sent off to provider under 10/1/21 RF encounter    Pt LMV expressing his frustrations about being out of his ADHD meds asking Dr. Alvarez or staff to call him ASAP.     Reviewed EMR, both Adderrall doses were denied on 10/4/21 dt  \" I would like to continue with the plan for the patient to begin chemical dependency treatment before we refill the med.\"     Made a phone call to Chu and updated of above. Pt was upset of being suddenly cut off the Adderall without talking to him first. Chu thought it is very unprofessional that his doctor never discussed this with him, he has never received a direct phone call from psychiatrist and first hearing it from this Wrt. Pt said he will be reaching out to professionals who are higher up to get to the bottom of this problem.   "

## 2021-10-14 NOTE — TELEPHONE ENCOUNTER
"Pt LMV expressing his frustrations about being out of his ADHD meds asking Dr. Alvarez or staff to call him ASAP.    Reviewed EMR, both Adderrall doses were denied on 10/4/21 dt  \" I would like to continue with the plan for the patient to begin chemical dependency treatment before we refill the med.\"    Made a phone call to Chu and updated of above. Pt was upset of being suddenly cut off the Adderall without talking to him first. Chu thought it is very unprofessional that his doctor never discussed this with him, he has never received a direct phone call from psychiatrist, and first hearing it from this Wrt. Pt said he will be reaching out to professionals who are higher up to get to the bottom of this problem.   "

## 2021-10-17 ENCOUNTER — HEALTH MAINTENANCE LETTER (OUTPATIENT)
Age: 36
End: 2021-10-17

## 2022-05-17 ENCOUNTER — HOSPITAL ENCOUNTER (EMERGENCY)
Facility: CLINIC | Age: 37
Discharge: HOME | End: 2022-05-18
Attending: EMERGENCY MEDICINE | Admitting: EMERGENCY MEDICINE
Payer: MEDICAID

## 2022-05-17 VITALS
SYSTOLIC BLOOD PRESSURE: 135 MMHG | RESPIRATION RATE: 20 BRPM | HEART RATE: 84 BPM | OXYGEN SATURATION: 100 % | DIASTOLIC BLOOD PRESSURE: 66 MMHG | TEMPERATURE: 98.1 F

## 2022-05-17 DIAGNOSIS — M79.672 BILATERAL FOOT PAIN: ICD-10-CM

## 2022-05-17 DIAGNOSIS — F15.10 METHAMPHETAMINE ABUSE (H): ICD-10-CM

## 2022-05-17 DIAGNOSIS — M79.671 BILATERAL FOOT PAIN: ICD-10-CM

## 2022-05-17 PROCEDURE — 99283 EMERGENCY DEPT VISIT LOW MDM: CPT

## 2022-05-17 RX ORDER — OLANZAPINE 10 MG/1
10 TABLET, ORALLY DISINTEGRATING ORAL AT BEDTIME
Status: DISCONTINUED | OUTPATIENT
Start: 2022-05-17 | End: 2022-05-18 | Stop reason: HOSPADM

## 2022-05-18 PROCEDURE — 250N000013 HC RX MED GY IP 250 OP 250 PS 637: Performed by: EMERGENCY MEDICINE

## 2022-05-18 RX ORDER — IBUPROFEN 600 MG/1
600 TABLET, FILM COATED ORAL ONCE
Status: COMPLETED | OUTPATIENT
Start: 2022-05-18 | End: 2022-05-18

## 2022-05-18 RX ORDER — ACETAMINOPHEN 325 MG/1
975 TABLET ORAL ONCE
Status: COMPLETED | OUTPATIENT
Start: 2022-05-18 | End: 2022-05-18

## 2022-05-18 RX ADMIN — IBUPROFEN 600 MG: 600 TABLET ORAL at 07:25

## 2022-05-18 RX ADMIN — ACETAMINOPHEN 975 MG: 325 TABLET, FILM COATED ORAL at 07:25
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
## 2022-05-18 NOTE — ED NOTES
Pt tucked self in to bed and resting calmly. Pt declines po meds at this time and remains calm and cooperative.

## 2022-05-18 NOTE — ED NOTES
"Pt sitting on the edge of the bed. Pt then walks to the door \"hello\" went in a talked to the patient. \"I need the doctor to come back and really look at my feet, there is something wrong with them. Im not going to be able to walk very far on them\" report to MD  "

## 2022-05-18 NOTE — ED PROVIDER NOTES
History   Chief Complaint:  Psychosis and Methamphetamine Use     HPI   Chu Pimentel is a 36 year old male with history of methamphetamine abuse who presents with bilateral foot pain and swelling due to walking a lot and not feeling well.    Patient is a 36-year-old male who.  Admits to homelessness.  Patient does admit to active methamphetamine abuse.  Patient states he was walking a lot today and noticed his feet were swollen.  He has no place to go.  He is somewhat tangential and talks about a lot of things that do not make sense.  Patient admits to methamphetamine use today.  He does not wear where he is going to go.  He wonders why his foot or his feet are swollen..    Review of Systems  No trauma to the foot no fever positive for methamphetamine abuse all the systems negative except as above    Allergies:  Penicillins    Medications:  Norvasc  Adderall  Prinivil/ Zestril  Celexa    Past Medical History:     Anxiety   Depression  Hypertension  Obesity  Methamphetamine abuse   ADHD  Insomnia  Obesity   Migraine  Tobacco abuse    Past Surgical History:    Cholecystectomy      Family History:    Mother: hypertension  Father: hypertension    Social History:  The patient presents to the ED via ambulance.   The patient is homeless.     Physical Exam     Patient Vitals for the past 24 hrs:   BP Temp Temp src Pulse Resp SpO2   05/17/22 2210 135/66 98.1  F (36.7  C) Oral 84 20 100 %       Physical Exam  Vitals and nursing note reviewed.   Constitutional:       Comments: Unkempt pacing in the room   HENT:      Head: Normocephalic.      Right Ear: Tympanic membrane normal.      Left Ear: Tympanic membrane normal.      Nose: Nose normal.      Mouth/Throat:      Mouth: Mucous membranes are moist.   Cardiovascular:      Rate and Rhythm: Normal rate.   Abdominal:      General: Abdomen is flat.      Palpations: Abdomen is soft.   Musculoskeletal:      Comments: Patient describes swelling in both feet they are both dirty.   There is no open wounds there is no erythema or signs of infection.   Skin:     General: Skin is warm.      Capillary Refill: Capillary refill takes less than 2 seconds.   Neurological:      General: No focal deficit present.      Mental Status: He is alert and oriented to person, place, and time.   Psychiatric:      Comments: Seems quite agitated and tangential suspect methamphetamine intoxication.            Emergency Department Course     Emergency Department Course:         Reviewed:  I reviewed nursing notes, vitals, past medical history and Care Everywhere    Assessments:  2323 I obtained history and examined the patient as noted above.   0049 I rechecked the patient and explained findings.   0525 I rechecked the patient. He is sleeping.     Interventions:   Zyprexa 10mg oral     Disposition:  Care of the patient was transferred to my colleague Dr. Shah pending clinical sobriety and reevaluation.     Impression & Plan     Medical Decision Making:  Patient presents with concerns for feet swelling after walking a lot.  P patient seems somewhat intoxicated and suspect methamphetamine abuse due to his history and bizarre behavior.  Patient is noted to be homeless.  Does not know where he wants to go.  Ultimately patient was offered something to relax him.  Suspect we can offer him at least a bed for the night and have the methamphetamines wear off and reassess in the morning.  At 6 AM in the morning patient was signed out to Dr. Muro pending reassessment.  Patient slept well overnight without complication no lab work was run due to concern suspicions of methamphetamine intoxication and homelessness    Diagnosis:    ICD-10-CM    1. Methamphetamine abuse (H)  F15.10    2. Bilateral foot pain  M79.671     M79.672        Discharge Medications:  New Prescriptions    No medications on file       Scribe Disclosure:  Sandy BHATTI, am serving as a scribe at 11:23 PM on 5/17/2022 to document services personally performed  by Serafin Moore MD based on my observations and the provider's statements to me.      Serafin Moore MD  05/18/22 [id number]

## 2022-05-18 NOTE — ED NOTES
Pt short and sarcastic with staff but redirectable. Pt c/o his feet which are extremely filthy with no signs of open skin or infections. Pt arrives wearing sandals.

## 2022-05-19 ENCOUNTER — PATIENT OUTREACH (OUTPATIENT)
Dept: CARE COORDINATION | Facility: CLINIC | Age: 37
End: 2022-05-19
Payer: MEDICAID

## 2022-05-19 DIAGNOSIS — Z71.89 OTHER SPECIFIED COUNSELING: ICD-10-CM

## 2022-05-19 NOTE — PROGRESS NOTES
"Clinic Care Coordination Contact  [facility]/Voicemail       Clinical Data: Care Coordinator Outreach  Outreach attempted x 1.  Unable to reach patient as \"call cannot be completed at this time\"  Plan: CC will attempt to reach patient again in 1-2 business days.    RILEY English   Social Work Clinic Care Coordinator   [facility]  PH: [phone]  [email or web address]    "

## 2022-05-20 NOTE — PROGRESS NOTES
Clinic Care Coordination Contact  [facility]       Clinical Data: Care Coordinator Outreach  Outreach attempted x 2.  Unable to leave message as call cannot be completed at this time  Plan: CC SW will make no further outreaches at this time.    RILEY English   Social Work Clinic Care Coordinator   [facility]  PH: [phone]  [email or web address]

## 2022-10-02 ENCOUNTER — HEALTH MAINTENANCE LETTER (OUTPATIENT)
Age: 37
End: 2022-10-02

## 2022-11-24 ENCOUNTER — HOSPITAL ENCOUNTER (EMERGENCY)
Facility: CLINIC | Age: 37
Discharge: HOME | End: 2022-11-25
Attending: EMERGENCY MEDICINE | Admitting: EMERGENCY MEDICINE
Payer: MEDICAID

## 2022-11-24 DIAGNOSIS — Z91.148 NONCOMPLIANCE WITH MEDICATION REGIMEN: ICD-10-CM

## 2022-11-24 DIAGNOSIS — F15.10 METHAMPHETAMINE ABUSE (H): ICD-10-CM

## 2022-11-24 DIAGNOSIS — F41.9 ANXIETY: ICD-10-CM

## 2022-11-24 LAB
ALBUMIN SERPL BCG-MCNC: 3.9 G/DL (ref 3.5–5.2)
ALP SERPL-CCNC: 50 U/L (ref 40–129)
ALT SERPL W P-5'-P-CCNC: ABNORMAL U/L
ANION GAP SERPL CALCULATED.3IONS-SCNC: 11 MMOL/L (ref 7–15)
AST SERPL W P-5'-P-CCNC: ABNORMAL U/L
BASOPHILS # BLD AUTO: 0 10E3/UL (ref 0–0.2)
BASOPHILS NFR BLD AUTO: 0 %
BILIRUB SERPL-MCNC: 0.4 MG/DL
BUN SERPL-MCNC: 15.8 MG/DL (ref 6–20)
CALCIUM SERPL-MCNC: 8.9 MG/DL (ref 8.6–10)
CHLORIDE SERPL-SCNC: 103 MMOL/L (ref 98–107)
CREAT SERPL-MCNC: 0.66 MG/DL (ref 0.67–1.17)
DEPRECATED HCO3 PLAS-SCNC: 27 MMOL/L (ref 22–29)
EOSINOPHIL # BLD AUTO: 0.1 10E3/UL (ref 0–0.7)
EOSINOPHIL NFR BLD AUTO: 1 %
ERYTHROCYTE [DISTWIDTH] IN BLOOD BY AUTOMATED COUNT: 14.3 % (ref 10–15)
GFR SERPL CREATININE-BSD FRML MDRD: >90 ML/MIN/1.73M2
GLUCOSE SERPL-MCNC: 168 MG/DL (ref 70–99)
HCT VFR BLD AUTO: 46.4 % (ref 40–53)
HGB BLD-MCNC: 15.2 G/DL (ref 13.3–17.7)
IMM GRANULOCYTES # BLD: 0 10E3/UL
IMM GRANULOCYTES NFR BLD: 0 %
LYMPHOCYTES # BLD AUTO: 1.6 10E3/UL (ref 0.8–5.3)
LYMPHOCYTES NFR BLD AUTO: 21 %
MCH RBC QN AUTO: 29.1 PG (ref 26.5–33)
MCHC RBC AUTO-ENTMCNC: 32.8 G/DL (ref 31.5–36.5)
MCV RBC AUTO: 89 FL (ref 78–100)
MONOCYTES # BLD AUTO: 0.5 10E3/UL (ref 0–1.3)
MONOCYTES NFR BLD AUTO: 6 %
NEUTROPHILS # BLD AUTO: 5.7 10E3/UL (ref 1.6–8.3)
NEUTROPHILS NFR BLD AUTO: 72 %
NRBC # BLD AUTO: 0 10E3/UL
NRBC BLD AUTO-RTO: 0 /100
PLATELET # BLD AUTO: 251 10E3/UL (ref 150–450)
POTASSIUM SERPL-SCNC: 4.7 MMOL/L (ref 3.4–5.3)
PROT SERPL-MCNC: 6.9 G/DL (ref 6.4–8.3)
RBC # BLD AUTO: 5.22 10E6/UL (ref 4.4–5.9)
SODIUM SERPL-SCNC: 141 MMOL/L (ref 136–145)
WBC # BLD AUTO: 7.9 10E3/UL (ref 4–11)

## 2022-11-24 PROCEDURE — 96361 HYDRATE IV INFUSION ADD-ON: CPT | Performed by: EMERGENCY MEDICINE

## 2022-11-24 PROCEDURE — 36415 COLL VENOUS BLD VENIPUNCTURE: CPT | Performed by: EMERGENCY MEDICINE

## 2022-11-24 PROCEDURE — 96360 HYDRATION IV INFUSION INIT: CPT | Performed by: EMERGENCY MEDICINE

## 2022-11-24 PROCEDURE — 258N000003 HC RX IP 258 OP 636: Performed by: EMERGENCY MEDICINE

## 2022-11-24 PROCEDURE — 85025 COMPLETE CBC W/AUTO DIFF WBC: CPT | Performed by: EMERGENCY MEDICINE

## 2022-11-24 PROCEDURE — 250N000013 HC RX MED GY IP 250 OP 250 PS 637: Performed by: EMERGENCY MEDICINE

## 2022-11-24 PROCEDURE — 84155 ASSAY OF PROTEIN SERUM: CPT | Performed by: EMERGENCY MEDICINE

## 2022-11-24 PROCEDURE — 99284 EMERGENCY DEPT VISIT MOD MDM: CPT | Mod: 25 | Performed by: EMERGENCY MEDICINE

## 2022-11-24 PROCEDURE — 99283 EMERGENCY DEPT VISIT LOW MDM: CPT | Performed by: EMERGENCY MEDICINE

## 2022-11-24 RX ORDER — CITALOPRAM HYDROBROMIDE 40 MG/1
40 TABLET ORAL DAILY
Qty: 30 TABLET | Refills: 0 | Status: SHIPPED | OUTPATIENT
Start: 2022-11-24 | End: 2023-08-26

## 2022-11-24 RX ORDER — LISINOPRIL 10 MG/1
30 TABLET ORAL DAILY
Qty: 90 TABLET | Refills: 0 | Status: SHIPPED | OUTPATIENT
Start: 2022-11-24 | End: 2023-08-26

## 2022-11-24 RX ORDER — SODIUM CHLORIDE 9 MG/ML
INJECTION, SOLUTION INTRAVENOUS CONTINUOUS
Status: DISCONTINUED | OUTPATIENT
Start: 2022-11-24 | End: 2022-11-25 | Stop reason: HOSPADM

## 2022-11-24 RX ORDER — OLANZAPINE 5 MG/1
10 TABLET, ORALLY DISINTEGRATING ORAL ONCE
Status: COMPLETED | OUTPATIENT
Start: 2022-11-24 | End: 2022-11-24

## 2022-11-24 RX ADMIN — SODIUM CHLORIDE: 9 INJECTION, SOLUTION INTRAVENOUS at 22:43

## 2022-11-24 RX ADMIN — SODIUM CHLORIDE 1000 ML: 9 INJECTION, SOLUTION INTRAVENOUS at 21:05

## 2022-11-24 RX ADMIN — OLANZAPINE 10 MG: 5 TABLET, ORALLY DISINTEGRATING ORAL at 21:29

## 2022-11-24 ASSESSMENT — ENCOUNTER SYMPTOMS
MYALGIAS: 1
SLEEP DISTURBANCE: 1
CHILLS: 1

## 2022-11-24 ASSESSMENT — ACTIVITIES OF DAILY LIVING (ADL)
ADLS_ACUITY_SCORE: 35
ADLS_ACUITY_SCORE: 35

## 2022-11-25 VITALS
DIASTOLIC BLOOD PRESSURE: 69 MMHG | HEIGHT: 69 IN | OXYGEN SATURATION: 91 % | RESPIRATION RATE: 16 BRPM | TEMPERATURE: 98.1 F | BODY MASS INDEX: 41.47 KG/M2 | SYSTOLIC BLOOD PRESSURE: 103 MMHG | WEIGHT: 280 LBS | HEART RATE: 84 BPM

## 2022-11-25 ASSESSMENT — ACTIVITIES OF DAILY LIVING (ADL): ADLS_ACUITY_SCORE: 35

## 2022-11-25 NOTE — ED NOTES
PT states he used meth over 24 hours ago and he is wanting to get help with detox. PT states he has been to [facility] and wants to go to detox there. PT is A&Ox3. Breathing regular and unlabored. PT pupils pinpoint, he states frustration wanting help but not being able to get it. PT updated on care by MD and given resource to call at 2230.

## 2022-11-25 NOTE — ED NOTES
"PT acting very paranoid. PT keeps repeating same question he states \"why do you need my blood you're going to use it in a ritual.\" PT falling asleep while talking. PT was asked if he took anything after coming into the room and he states no. PT on monitor and asking for ativan for anxiety at this time.   "

## 2022-11-25 NOTE — ED NOTES
PT states he called detox center who states that there will be a bed available at 0300. ED MD notified and ED charge aware.

## 2022-11-25 NOTE — ED PROVIDER NOTES
[place] EMERGENCY DEPARTMENT ([facility])  11/24/22    History     Chief Complaint   Patient presents with     Withdrawal     HPI  Chu Pimentel is a 37 year old male with PMH significant for homelessness, methamphetamine use disorder, HTN, MDD, and CULLEN who presents to the ED seeking detox.  Patient reports that he uses methamphetamine daily, approximately 1 g a week.  He states that yesterday he used methamphetamine that he believes was laced with fentanyl and is now seeking detox.  He states that he wants detox and wants to get into a substance abuse program.  He believes that the methamphetamine he used yesterday was laced as he experienced myalgias, felt warm/cold, and has been experiencing insomnia which he states is unusual for him.      Patient also initially states that he needs a second opinion for his foot as he thinks he has frostbite.  He then changes his mind and says that his foot is fine.  Patient does report that he was seen at [facility] earlier today but felt that he was not getting adequate care so he decided to come here.  He denies having a PCP.  He does report he is supposed be on medications, but has not taken them in a few weeks.  He reports that [facility] did give him some Suboxone.  He denies suicidal ideation.    Past Medical History  Past Medical History:   Diagnosis Date     Anxiety      Depression      Hypertension      Obesity      Past Surgical History:   Procedure Laterality Date     CHOLECYSTECTOMY  2012     citalopram (CELEXA) 40 MG tablet  lisinopril (ZESTRIL) 10 MG tablet  amLODIPine (NORVASC) 10 MG tablet  amphetamine-dextroamphetamine (ADDERALL) 10 MG tablet  amphetamine-dextroamphetamine (ADDERALL) 30 MG tablet  Blood Pressure Monitoring (BLOOD PRESSURE CUFF) MISC  citalopram (CELEXA) 40 MG tablet  ibuprofen (ADVIL,MOTRIN) 200 MG tablet  lisinopriL (PRINIVIL,ZESTRIL) 30 MG tablet      Allergies   Allergen Reactions     Penicillins Unknown     Family History  Family History  "  Problem Relation Age of Onset     Hypertension Mother      Hypertension Father      Social History   Social History     Tobacco Use     Smoking status: Some Days     Packs/day: 0.50     Types: Cigarettes     Smokeless tobacco: Never     Tobacco comments:     E CIG   Substance Use Topics     Alcohol use: Yes     Drug use: Not Currently     Types: Methamphetamines     Comment: Drug use: hx of meth use       Past medical history, past surgical history, medications, allergies, family history, and social history were reviewed with the patient. No additional pertinent items.       Review of Systems   Constitutional: Positive for chills.   Musculoskeletal: Positive for myalgias.   Psychiatric/Behavioral: Positive for sleep disturbance. Negative for suicidal ideas.   All other systems reviewed and are negative.    A complete review of systems was performed with pertinent positives and negatives noted in the HPI, and all other systems negative.    Physical Exam   BP: 136/82  Pulse: 90  Temp: 98.1  F (36.7  C)  Resp: 18  Height: 175.3 cm (5' 9\")  Weight: 127 kg (280 lb)  SpO2: 95 %  Physical Exam  Vitals and nursing note reviewed.   HENT:      Head: Atraumatic.   Neurological:      Mental Status: He is alert and oriented to person, place, and time.   Psychiatric:         Mood and Affect: Affect is angry and inappropriate.         Speech: Speech is rapid and pressured.         Behavior: Behavior is agitated.         Thought Content: Thought content is paranoid.         Judgment: Judgment is impulsive and inappropriate.         ED Course      Procedures   7:33 PM  The patient was seen and examined by Hans Tapia MD in Room ED08.          The patient is inappropriately agitated.    Discussed with CD intake on the [facility] they confirmed there is no detox for methamphetamine.    Spoke to [facility] detox and they may have a bed later tonight     Results for orders placed or performed during the hospital " encounter of 11/24/22   CBC with platelets differential     Status: None ()    Narrative    The following orders were created for panel order CBC with platelets differential.  Procedure                               Abnormality         Status                     ---------                               -----------         ------                     CBC with platelets and d...[[id number]]                                                   Please view results for these tests on the individual orders.     Medications   0.9% sodium chloride BOLUS (has no administration in time range)     Followed by   sodium chloride 0.9% infusion (has no administration in time range)        Assessments & Plan (with Medical Decision Making)   37-year-old male who is homeless and addicted to methamphetamine here saying that he is committed to quitting he would like to go into detox.  I explained there was no detox on the [place] for methamphetamine and that he night likely needed a rule 25 evaluation to get into treatment I did call [facility] detox and they likely will take him around 11:00 tonight.  I will refill his Celexa and lisinopril.  Encouraged him to get into treatment.    I have reviewed the nursing notes. I have reviewed the findings, diagnosis, plan and need for follow up with the patient.    New Prescriptions    CITALOPRAM (CELEXA) 40 MG TABLET    Take 1 tablet (40 mg) by mouth daily for 30 days    LISINOPRIL (ZESTRIL) 10 MG TABLET    Take 3 tablets (30 mg) by mouth daily for 30 days       Final diagnoses:   Methamphetamine abuse (H)   Anxiety   Noncompliance with medication regimen     IHank, am serving as a trained medical scribe to document services personally performed by Hans Tapia MD, based on the provider's statements to me.     I, Hans Tapia MD, was physically present and have reviewed and verified the accuracy of this note documented by Hank Kat.    --  Hans Hendrix  MD Willie  [facility] EMERGENCY DEPARTMENT  11/24/2022     Hans Tapia MD  11/24/22 0271

## 2022-11-25 NOTE — ED TRIAGE NOTES
Patient states he believes he is withdrawing. He smoked some meth the other day and he believes there was fentanyl in it. He was just at [facility]. He wants to be transported to the [facility] for detox.  VSS

## 2022-11-25 NOTE — DISCHARGE INSTRUCTIONS
Restart your medications as directed  Go to [facility] detox  You need a Rule 25 evaluation to get into a treatment program

## 2022-12-26 ENCOUNTER — HOSPITAL ENCOUNTER (EMERGENCY)
Facility: CLINIC | Age: 37
Discharge: HOME | End: 2022-12-27
Attending: EMERGENCY MEDICINE | Admitting: EMERGENCY MEDICINE
Payer: MEDICAID

## 2022-12-26 VITALS
HEIGHT: 69 IN | BODY MASS INDEX: 40.82 KG/M2 | TEMPERATURE: 97.6 F | RESPIRATION RATE: 16 BRPM | OXYGEN SATURATION: 99 % | SYSTOLIC BLOOD PRESSURE: 168 MMHG | HEART RATE: 75 BPM | WEIGHT: 275.57 LBS | DIASTOLIC BLOOD PRESSURE: 104 MMHG

## 2022-12-26 DIAGNOSIS — Z59.00 HOMELESSNESS: ICD-10-CM

## 2022-12-26 DIAGNOSIS — F15.10 METHAMPHETAMINE ABUSE (H): ICD-10-CM

## 2022-12-26 DIAGNOSIS — I10 ESSENTIAL HYPERTENSION: ICD-10-CM

## 2022-12-26 PROCEDURE — 99284 EMERGENCY DEPT VISIT MOD MDM: CPT | Performed by: EMERGENCY MEDICINE

## 2022-12-26 PROCEDURE — 250N000013 HC RX MED GY IP 250 OP 250 PS 637: Performed by: EMERGENCY MEDICINE

## 2022-12-26 RX ORDER — ESCITALOPRAM OXALATE 20 MG/1
20 TABLET ORAL
COMMUNITY
Start: 2022-01-27 | End: 2023-08-26

## 2022-12-26 RX ORDER — ESCITALOPRAM OXALATE 20 MG/1
TABLET ORAL
COMMUNITY
Start: 2022-11-22 | End: 2023-08-26

## 2022-12-26 RX ORDER — HYDROXYZINE HYDROCHLORIDE 25 MG/1
TABLET, FILM COATED ORAL
COMMUNITY
Start: 2022-07-26 | End: 2023-08-26

## 2022-12-26 RX ORDER — AMLODIPINE BESYLATE 5 MG/1
TABLET ORAL
COMMUNITY
Start: 2022-07-24 | End: 2022-12-27

## 2022-12-26 RX ORDER — BUPRENORPHINE HYDROCHLORIDE AND NALOXONE HYDROCHLORIDE DIHYDRATE 8; 2 MG/1; MG/1
TABLET SUBLINGUAL
COMMUNITY
Start: 2022-11-24 | End: 2023-08-26

## 2022-12-26 RX ORDER — LISINOPRIL 20 MG/1
20 TABLET ORAL DAILY
COMMUNITY
Start: 2022-06-10 | End: 2022-12-27

## 2022-12-26 RX ORDER — ATOMOXETINE 100 MG/1
CAPSULE ORAL
COMMUNITY
Start: 2022-11-22 | End: 2023-08-26

## 2022-12-26 RX ORDER — BUPROPION HYDROCHLORIDE 100 MG/1
TABLET, EXTENDED RELEASE ORAL
COMMUNITY
Start: 2022-06-22 | End: 2023-08-26

## 2022-12-26 RX ORDER — NALOXONE HYDROCHLORIDE 4 MG/.1ML
SPRAY NASAL
COMMUNITY
Start: 2022-11-24 | End: 2023-08-26

## 2022-12-26 RX ORDER — LISINOPRIL 30 MG/1
1 TABLET ORAL DAILY
COMMUNITY
Start: 2022-12-06 | End: 2022-12-27

## 2022-12-26 RX ORDER — AMLODIPINE BESYLATE 5 MG/1
10 TABLET ORAL ONCE
Status: COMPLETED | OUTPATIENT
Start: 2022-12-26 | End: 2022-12-26

## 2022-12-26 RX ORDER — CITALOPRAM HYDROBROMIDE 10 MG/1
4 TABLET ORAL DAILY
COMMUNITY
Start: 2022-12-06 | End: 2023-08-26

## 2022-12-26 RX ORDER — BUPROPION HYDROCHLORIDE 150 MG/1
TABLET ORAL
COMMUNITY
Start: 2022-07-26 | End: 2023-08-26

## 2022-12-26 RX ORDER — COLCHICINE 0.6 MG/1
TABLET ORAL
COMMUNITY
Start: 2022-06-10 | End: 2023-08-26

## 2022-12-26 RX ORDER — BACITRACIN ZINC 500 [USP'U]/G
OINTMENT TOPICAL
COMMUNITY
Start: 2022-11-24 | End: 2023-08-26

## 2022-12-26 RX ORDER — ATOMOXETINE 100 MG/1
100 CAPSULE ORAL
COMMUNITY
Start: 2022-01-27 | End: 2023-08-26

## 2022-12-26 RX ORDER — COLCHICINE 0.6 MG/1
CAPSULE ORAL
COMMUNITY
Start: 2022-08-30 | End: 2023-08-26

## 2022-12-26 RX ADMIN — LISINOPRIL 30 MG: 20 TABLET ORAL at 22:16

## 2022-12-26 RX ADMIN — AMLODIPINE BESYLATE 10 MG: 5 TABLET ORAL at 22:15

## 2022-12-26 SDOH — ECONOMIC STABILITY - HOUSING INSECURITY: HOMELESSNESS UNSPECIFIED: Z59.00

## 2022-12-26 ASSESSMENT — ENCOUNTER SYMPTOMS
BACK PAIN: 0
VOMITING: 0
CHILLS: 0
FEVER: 0
NERVOUS/ANXIOUS: 0
SHORTNESS OF BREATH: 0
NAUSEA: 0
DIFFICULTY URINATING: 0
COLOR CHANGE: 1
HEADACHES: 0
ABDOMINAL PAIN: 0
NECK PAIN: 0

## 2022-12-26 ASSESSMENT — ACTIVITIES OF DAILY LIVING (ADL): ADLS_ACUITY_SCORE: 35

## 2022-12-27 RX ORDER — OLANZAPINE 5 MG/1
5 TABLET ORAL EVERY 6 HOURS PRN
Qty: 16 TABLET | Refills: 0 | Status: SHIPPED | OUTPATIENT
Start: 2022-12-27 | End: 2023-08-26

## 2022-12-27 RX ORDER — AMLODIPINE BESYLATE 10 MG/1
10 TABLET ORAL DAILY
Qty: 30 TABLET | Refills: 3 | Status: SHIPPED | OUTPATIENT
Start: 2022-12-27 | End: 2023-08-26

## 2022-12-27 RX ORDER — LISINOPRIL 30 MG/1
30 TABLET ORAL DAILY
Qty: 30 TABLET | Refills: 11 | Status: SHIPPED | OUTPATIENT
Start: 2022-12-27 | End: 2023-08-26

## 2022-12-27 ASSESSMENT — ACTIVITIES OF DAILY LIVING (ADL): ADLS_ACUITY_SCORE: 35

## 2022-12-27 NOTE — ED PROVIDER NOTES
ED Provider Note  [facility]      History     Chief Complaint   Patient presents with     Homeless     HPI  Chu Pimentel is a 37 year old male with history of homelessness, hypertension, presents the ED for evaluation of bilateral foot pain.  He states he has been walking around in the cold today.  He is concerned he may have frostbite.  He also states he lost his blood pressure medicines and has not taken his blood pressure medicine for the past 2 days.  He endorses methamphetamine use just prior to arrival.  He is interested in getting into a detox program.  Specifically, he would like to go to [facility] where he has been before.    Past Medical History  Past Medical History:   Diagnosis Date     Anxiety      Depression      Hypertension      Obesity      Past Surgical History:   Procedure Laterality Date     CHOLECYSTECTOMY  2012     amLODIPine (NORVASC) 10 MG tablet  atomoxetine (STRATTERA) 100 MG capsule  bacitracin 500 UNIT/GM external ointment  citalopram (CELEXA) 10 MG tablet  escitalopram (LEXAPRO) 20 MG tablet  lisinopril (ZESTRIL) 30 MG tablet  metFORMIN (GLUCOPHAGE) 500 MG tablet  naloxone (NARCAN) 4 MG/0.1ML nasal spray  OLANZapine (ZYPREXA) 5 MG tablet  amphetamine-dextroamphetamine (ADDERALL) 10 MG tablet  amphetamine-dextroamphetamine (ADDERALL) 30 MG tablet  atomoxetine (STRATTERA) 100 MG capsule  Blood Pressure Monitoring (BLOOD PRESSURE CUFF) MISC  buprenorphine-naloxone (SUBOXONE) 8-2 MG SUBL sublingual tablet  buPROPion (WELLBUTRIN SR) 100 MG 12 hr tablet  buPROPion (WELLBUTRIN XL) 150 MG 24 hr tablet  citalopram (CELEXA) 40 MG tablet  citalopram (CELEXA) 40 MG tablet  colchicine (COLCYRS) 0.6 MG tablet  colchicine (MITIGARE) 0.6 MG capsule  escitalopram (LEXAPRO) 20 MG tablet  hydrOXYzine (ATARAX) 25 MG tablet  ibuprofen (ADVIL,MOTRIN) 200 MG tablet  lisinopril (ZESTRIL) 10 MG tablet  metFORMIN (GLUCOPHAGE) 500 MG tablet  NARCAN 4 MG/0.1ML nasal  "spray      Allergies   Allergen Reactions     Penicillins Unknown     Family History  Family History   Problem Relation Age of Onset     Hypertension Mother      Hypertension Father      Social History   Social History     Tobacco Use     Smoking status: Some Days     Packs/day: 0.50     Types: Cigarettes     Smokeless tobacco: Never     Tobacco comments:     E CIG   Substance Use Topics     Alcohol use: Yes     Drug use: Not Currently     Types: Methamphetamines     Comment: Drug use: hx of meth use       Past medical history, past surgical history, medications, allergies, family history, and social history were reviewed with the patient. No additional pertinent items.       Review of Systems   Constitutional: Negative for chills and fever.   Respiratory: Negative for shortness of breath.    Cardiovascular: Negative for chest pain.   Gastrointestinal: Negative for abdominal pain, nausea and vomiting.   Genitourinary: Negative for difficulty urinating.   Musculoskeletal: Negative for back pain and neck pain.        B/l foot pain   Skin: Positive for color change.   Neurological: Negative for headaches.   Psychiatric/Behavioral: The patient is not nervous/anxious.      A complete review of systems was performed with pertinent positives and negatives noted in the HPI, and all other systems negative.    Physical Exam   BP: (!) 168/104  Pulse: 75  Temp: 97.6  F (36.4  C)  Resp: 16  Height: 175.3 cm (5' 9\")  Weight: 125 kg (275 lb 9.2 oz)  SpO2: 99 %  Physical Exam  Vitals and nursing note reviewed.   Constitutional:       General: He is not in acute distress.     Appearance: Normal appearance.   HENT:      Head: Normocephalic.      Nose: Nose normal.   Eyes:      Pupils: Pupils are equal, round, and reactive to light.   Cardiovascular:      Rate and Rhythm: Normal rate and regular rhythm.   Pulmonary:      Effort: Pulmonary effort is normal.   Abdominal:      General: There is no distension.   Musculoskeletal:         " General: No deformity. Normal range of motion.      Cervical back: Normal range of motion.      Comments: No erythema of the bilateral feet.  No blistering.  Normal sensory exam.  No skin breakdown.  No evidence of acute frostbite.   Skin:     General: Skin is warm.   Neurological:      Mental Status: He is alert and oriented to person, place, and time.   Psychiatric:         Mood and Affect: Mood normal.         ED Course      Procedures       The medical record was reviewed and interpreted.  Managed outpatient prescription medications.              No results found for any visits on 12/26/22.  Medications   amLODIPine (NORVASC) tablet 10 mg (10 mg Oral Given 12/26/22 2215)   lisinopril (ZESTRIL) tablet 30 mg (30 mg Oral Given 12/26/22 2216)        Assessments & Plan (with Medical Decision Making)   Patient presents to the ED for evaluation of bilateral foot pain due to walking in the cold today.  On arrival, he is hypertensive at 168/104.  This has normal vital signs.  He has missed his blood pressure medicine for the past 2 days.  Was given his home dose of amlodipine and lisinopril and his repeat blood pressure is improved.    Examination of his feet is unremarkable.  No blistering, reduced sensation, or skin breakdown to suggest frostbite/burn injury.  Compartments are soft.  Patient is medically cleared.    Patient is interested in detox for amphetamines.  I spoke with intake team at [facility] and they do have space available at their facility.  They spoke with the patient over telephone and he consents for transfer to their facility to begin a 4-day detox program.  They requested that we provide a 4-day course of Zyprexa, given that he is detoxing from meth and could potentially develop psychosis. Our pharmacy is closed at this time, however, I spoke with the ED team at the [facility] who has dealt with this situation before.  They recommend sending electronic prescriptions to the [facility]  discharge pharmacy.  The prescription will then need to be couriered to the [facility].  I have also provided refill for patient's blood pressure medicines (amlodipine and lisinopril) as he states he lost them 2 days ago.  He does have his other medications.  We will arrange transfer to the [facility].      I have reviewed the nursing notes. I have reviewed the findings, diagnosis, plan and need for follow up with the patient.    New Prescriptions    OLANZAPINE (ZYPREXA) 5 MG TABLET    Take 1 tablet (5 mg) by mouth every 6 hours as needed (agitation/psychosis)       Final diagnoses:   Methamphetamine abuse (H)   Homelessness       --  Magdiel Gallegos DO  [facility] EMERGENCY DEPARTMENT  12/26/2022     Magdiel Gallegos DO  12/27/22 0059

## 2022-12-27 NOTE — ED TRIAGE NOTES
36 y/o male was attempting to get to detox but due to the bus and multiple transfers it got to late to get into detox. He states it's cold outside and his feet are cold. He doesn't have anywhere to go. Patient reports he lost his blood pressure medications recently.     BP (!) 168/104   Pulse 75   Temp 97.6  F (36.4  C) (Oral)   Resp 16   SpO2 99%       Kasey Agarwal on 12/26/2022 at 9:37 PM

## 2023-01-07 ENCOUNTER — HOSPITAL ENCOUNTER (EMERGENCY)
Facility: CLINIC | Age: 38
Discharge: HOME | End: 2023-01-08
Attending: STUDENT IN AN ORGANIZED HEALTH CARE EDUCATION/TRAINING PROGRAM | Admitting: STUDENT IN AN ORGANIZED HEALTH CARE EDUCATION/TRAINING PROGRAM
Payer: MEDICAID

## 2023-01-07 DIAGNOSIS — F22 PARANOIA (H): ICD-10-CM

## 2023-01-07 DIAGNOSIS — F15.10 METHAMPHETAMINE USE (H): ICD-10-CM

## 2023-01-07 PROCEDURE — 99284 EMERGENCY DEPT VISIT MOD MDM: CPT | Performed by: STUDENT IN AN ORGANIZED HEALTH CARE EDUCATION/TRAINING PROGRAM

## 2023-01-07 PROCEDURE — 99285 EMERGENCY DEPT VISIT HI MDM: CPT | Mod: 25 | Performed by: STUDENT IN AN ORGANIZED HEALTH CARE EDUCATION/TRAINING PROGRAM

## 2023-01-07 ASSESSMENT — ACTIVITIES OF DAILY LIVING (ADL): ADLS_ACUITY_SCORE: 33

## 2023-01-08 VITALS
OXYGEN SATURATION: 96 % | HEART RATE: 75 BPM | DIASTOLIC BLOOD PRESSURE: 69 MMHG | SYSTOLIC BLOOD PRESSURE: 110 MMHG | TEMPERATURE: 98.3 F | RESPIRATION RATE: 16 BRPM

## 2023-01-08 PROCEDURE — 250N000013 HC RX MED GY IP 250 OP 250 PS 637: Performed by: EMERGENCY MEDICINE

## 2023-01-08 PROCEDURE — 90791 PSYCH DIAGNOSTIC EVALUATION: CPT

## 2023-01-08 RX ORDER — IBUPROFEN 600 MG/1
600 TABLET, FILM COATED ORAL ONCE
Status: COMPLETED | OUTPATIENT
Start: 2023-01-08 | End: 2023-01-08

## 2023-01-08 RX ADMIN — IBUPROFEN 600 MG: 600 TABLET ORAL at 08:50

## 2023-01-08 ASSESSMENT — COLUMBIA-SUICIDE SEVERITY RATING SCALE - C-SSRS
6. HAVE YOU EVER DONE ANYTHING, STARTED TO DO ANYTHING, OR PREPARED TO DO ANYTHING TO END YOUR LIFE?: NO
TOTAL  NUMBER OF ABORTED OR SELF INTERRUPTED ATTEMPTS LIFETIME: NO
TOTAL  NUMBER OF INTERRUPTED ATTEMPTS LIFETIME: NO

## 2023-01-08 ASSESSMENT — ACTIVITIES OF DAILY LIVING (ADL)
ADLS_ACUITY_SCORE: 35
ADLS_ACUITY_SCORE: 33
ADLS_ACUITY_SCORE: 35

## 2023-01-08 NOTE — CONSULTS
"Diagnostic Evaluation Consultation  Crisis Assessment    Patient was assessed: Maggi  Patient location: [facility] ED 09  Was a release of information signed: No. Reason: declined      Referral Data and Chief Complaint  Chu CAMPA) is a 37 year old, who uses he/him pronouns, and presents to the ED alone. Patient is referred to the ED by self. Patient is presenting to the ED for the following concerns: social work services.      Informed Consent and Assessment Methods     Patient is his own guardian. Writer met with patient and explained the crisis assessment process, including applicable information disclosures and limits to confidentiality, assessed understanding of the process, and obtained consent to proceed with the assessment. Patient was observed to be able to participate in the assessment as evidenced by some willingness to engage in conversation with [facility]. Assessment methods included conducting a formal interview with patient, review of medical records, collaboration with medical staff, and obtaining relevant collateral information from family and community providers when available..     Over the course of this crisis assessment provided reassurance and offered validation. Patient's response to interventions was minimal.     Summary of Patient Situation  Pt presents to the ED looking for \"health and wellness,\" along with detox placement and medications for depression and anxiety, specifically Adderall. Pt has been using methamphetamines and identifies that \"this is becoming a problem for me.\"       Brief Psychosocial History  Pt experiences chronic homelessness and frequently stays at shelters. He indicated that he does work but did not specify whether he works full- or part-time.       Significant Clinical History  Pt is diagnosed with depression and anxiety, along with chronic substance use issues. Pt is not connected to any outpatient supports and is distrustful of mental health providers. He does " "not have a history of psychiatric hospitalizations but has been in CD treatment \"many, many, many times.\" Pt states that he goes into treatment for 30 days, uses once he gets out, and goes back into treatment.        Collateral Information  None available.     Risk Assessment  Neeses Suicide Severity Rating Scale Full Clinical Version: 1/8/2023      Suicidal Behavior  Has subject engaged in non-suicidal self-injurious behavior? (Lifetime): (P) No  Interrupted Attempts (Lifetime): (P) No  Aborted or Self-Interrupted Attempt (Lifetime): (P) No  Preparatory Acts or Behavior (Lifetime): (P) No  C-SSRS Risk (Lifetime/Recent)  Calculated C-SSRS Risk Score (Lifetime/Recent): (P) No Risk Indicated    Validity of evaluation is impacted by presenting factors during interview including paranoia, poor insight, and substance use.   Comments regarding subjective versus objective responses to Neeses tool: Pt appropriately answered initial questions about suicidal ideation but experienced difficulties answering more open-ended questions.  Environmental or Psychosocial Events: unstable housing, homelessness and ongoing abuse of substances  Chronic Risk Factors: other: chronic homelessness and substance abuse   Warning Signs: increasing substance use or abuse, anxiety, agitation, unable to sleep, sleeping all the time and recent discharges from emergency department or inpatient psychiatric care  Protective Factors: help seeking  Interpretation of Risk Scoring, Risk Mitigation Interventions and Safety Plan:  Pt presents as a low risk for suicidality. He indicates that he does not experience a desire to end his life, nor does he have a history of suicide attempts.        Does the patient have thoughts of harming others? No     Is the patient engaging in sexually inappropriate behavior?  no        Current Substance Abuse     Is there recent substance abuse? Substance type(s): methamphetamines Frequency: unable to report      Was a " "urine drug screen or blood alcohol level obtained: No       Mental Status Exam     Affect: Labile   Appearance: Appropriate    Attention Span/Concentration: Inattentive  Eye Contact: Avoidant   Fund of Knowledge: Delayed    Language /Speech Content: Fluent   Language /Speech Volume: Normal    Language /Speech Rate/Productions: Minimally Responsive and Normal    Recent Memory: Intact   Remote Memory: Intact   Mood: Irritable    Orientation to Person: Yes    Orientation to Place: Yes   Orientation to Time of Day: Yes    Orientation to Date: Yes    Situation (Do they understand why they are here?): No and Answer: pt was not able to clearly articulate what he came to the ED for    Psychomotor Behavior: Normal    Thought Content: Paranoia   Thought Form: Flight of Ideas and Tangential      History of commitment: No       Medication    Psychotropic medications: No current medications but a history of Adderall and Wellbutrin.  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 He initially did not want to speak with [facility] because the assessment was conducted via an iPad and he was distrustful that [facility] was a real mental health professional. Pt was difficult to understand at times due to rambling, subject jumping, and word salad. He stated that he needed \"services\" and identified that his meth use is \"becoming a problem for me.\" Pt was unable to effectively answer assessment " "questions, especially if they were open-ended. He frequently repeated that he needs medications and services but could not identify what he needed. Pt also exhibited paranoia, possibly related to his recent meth use. He expressed that providers are not helping him and that \"they are using my information wrong.\"    Pt does not present as an imminent risk of harm towards himself or others, nor does he meet inpatient criteria at this time. Pt expresses that he wants to get into a 30-day treatment program for his drug use and understands that he needs to get a chemical health assessment first. Pt would also like resources to get connected to physical and mental health services, along with [facility] services. [facility] provided pt with these resources in his discharge summary. Recommendation is for pt to complete a chemical health assessment and enter into a CD treatment program. Following completion of the program, pt should get connected to integrated care services (PCP, case management, mental/chemical health supports) for on-going care. Pt is in agreement with the recommendations.    Disposition    Recommended disposition: Substance Abuse Disorder Treatment and Rule 25/QUINCY Assessment       Reviewed case and recommendations with attending provider. Attending Name: Dru Cueto MD       Attending concurs with disposition: Yes       Patient concurs with disposition: Yes       Guardian concurs with disposition: NA      Final disposition: Substance abuse disorder treatment  and Rule 25/QUINCY Assessment.     Outpatient Details (if applicable):   Aftercare plan and appointments placed in the AVS and provided to patient: Yes. Given to patient by ED staff    Was lethal means counseling provided as a part of aftercare planning? No;       Assessment Details    Patient interview started at: 02:47 and completed at: 03:04.     Total duration spent on the patient case in minutes: 1.0 hrs      CPT code(s) utilized: 53961 - Psychotherapy for " Crisis - 60 (30-74*) min       Shy Pinzon, LICSW, MSW, LICSW, Psychotherapist  DEC - Triage & Transition Services  Callback: [phone]      Substance Use, Mental Health, and General Health Resources:    [facility] Recovery Services- [phone]    [facility]- [phone] (to get connected to [facility] services and programs)    Chemical Health Assessment- Behavioral [facility] Center, [address] [facility]- [phone]    Clinics that have general medical care, mental health treatment, and case management services    [facility] ([facility])- schedule an appointment by calling [phone]; [facility]- schedule an appointment by calling [phone]; [address] [facility]- [phone]      SUBSTANCE RELATED PREVENTION PLAN     Things that trigger me to use chemicals: agitation, frustration, being in an environment where drugs are present, increased anxiety, depressive symptoms     Ways in which I can avoid triggering experiences: spend time with friends/family who do not use, use distraction techniques (e.g. exercise, reading, mindfulness)      Coping tools I can utilize when experiencing cravings to use chemicals of abuse:   The box breathing method     1. Close your eyes. Breathe in through your nose while counting to four slowly. ...     2. Hold your breath inside while counting slowly to four. Try not to clamp your mouth or nose shut.     3. Begin to slowly exhale for 4 seconds.     4. Repeat steps 1 to 3 at least 10 times.           Use your five senses to help you move through distress.     1. Put your hands in cold water. ...     2.  or touch items near you. ...     3. Breathe deeply. ...     4. Savor a food or drink. ...     5. Take a short  walk. ...     6. Hold a piece of ice. ...     7. Savor a scent. ...     8. Move your body.           5,4,3,2,1 Grounding Exercise     Grounding is a technique that helps us reorient to the here-and-now, to bring up into the present. They are a useful technique if you ever feel overwhelmed, intensely anxious, or dissociated from your environment. The  5,4,3,2,1  exercise is a common sensory awareness grounding exercise that many find helpful to relax or get through difficult moments.     1. Describe 5 things you can see in the room     2. Name 4 things you can feel ( my feet on the floor  or  the air in my nose )     3. Name 3 things you are hear right now ( traffic outside )     4. Name 2 things you can smell right now (or 2 smells that you like)     5. Name 1 thing you like about yourself            5 4 3 2 1 CALM exercise to engage your senses:     5: Acknowledge FIVE things you see around you. Maybe it is a bird, maybe it is pencil, maybe it is a spot on the ceiling, however big or small, state 5 things you see.     4: Acknowledge FOUR things you can touch around you. Maybe this is your hair, hands, ground, grass, pillow, etc, whatever it may be, list out the 4 things you can feel.     3: Acknowledge THREE things you hear. This needs to be external, do not focus on your thoughts; maybe you can hear a clock, a car, a dog park. or maybe you hear your tummy rumbling, internal noises that make external sounds can count, what is audible in the moment is what you list.     2: Acknowledge TWO things you can smell: This one might be hard if you are not in a stimulating environment, if you cannot automatically sniff something out, walk nearby to find a scent. Maybe you walk to your bathroom to smell soap or outside to smell anything in nature, or even could be as simple as leaning over and smelling a pillow on the couch, or a pencil. Whatever it may be, take in the smells around you.     1. Acknowledge ONE thing you can  taste. What does the inside of your mouth taste like, gum, coffee, or the sandwich from lunch? Focus on your mouth as the last step and take in what you can taste.       People in my life that I can ask for help: friends, family, community providers     Changes I can make to support my physical and emotional health as it pertains to substance use: refrain from substance use, avoid situations where drugs are present, complete a chemical health assessment, attend all appointments as scheduled, take medications as prescribed      Other things that can help: I will attempt to avoid excessive use of mood altering chemicals with the understanding that while in crisis, they exacerbate social and/or emotional concerns.      If I am feeling unsafe or I am in a crisis, I will:      Contact my established care providers    Call the National Suicide Prevention Lifeline: [phone]      Go to the nearest emergency room      and/or    Call 911    Additional information:  Today you were seen by a licensed mental health professional through Triage and Transition services, [facility] (P)  for a crisis assessment in the Emergency Department at [facility].  It is recommended that you follow up with your established providers (psychiatrist, mental health therapist, and/or primary care doctor - as relevant) as soon as possible. Coordinators from [facility] will be calling you in the next 24-48 hours to ensure that you have the resources you need.  You can also contact [facility] coordinators directly at [phone]. You may have been scheduled for or offered an appointment with a mental health provider. [facility] maintains an extensive network of licensed behavioral health providers to connect patients with the services they need.  We do not charge providers a fee to participate in our referral network.  We match patients with providers based on a patient's specific needs, insurance coverage, and location.  Our first effort  will be to refer you to a provider within your care system, and will utilize providers outside your care system as needed.

## 2023-01-08 NOTE — ED PROVIDER NOTES
"ED Provider Note  [facility]      History     Chief Complaint   Patient presents with     Social Work Services     HPI  Chu Pimentel is a 37 year old male with history of depression, anxiety who presents with mental health concern.  States he is here for \"health and wellness \".  Reports he has been having depression and anxiety and would like to speak to a mental health .  Denies suicidal and homicidal ideation.  Has not been taking his psychiatric medications.  Currently does not have a place to stay and has been walking around frequently.  His feet are sore from this.  Has not been out in the cold and is not concerned for frostbite.  He does endorse recent methamphetamine use and denies alcohol and other drugs.  Does endorse some paranoia feeling like others are out to get him. Otherwise has not had any fevers, chills, chest pain, shortness of breath, nausea or vomiting.    Past Medical History  Past Medical History:   Diagnosis Date     Anxiety      Depression      Hypertension      Obesity      Past Surgical History:   Procedure Laterality Date     CHOLECYSTECTOMY  2012     amLODIPine (NORVASC) 10 MG tablet  amphetamine-dextroamphetamine (ADDERALL) 10 MG tablet  amphetamine-dextroamphetamine (ADDERALL) 30 MG tablet  atomoxetine (STRATTERA) 100 MG capsule  atomoxetine (STRATTERA) 100 MG capsule  bacitracin 500 UNIT/GM external ointment  Blood Pressure Monitoring (BLOOD PRESSURE CUFF) MISC  buprenorphine-naloxone (SUBOXONE) 8-2 MG SUBL sublingual tablet  buPROPion (WELLBUTRIN SR) 100 MG 12 hr tablet  buPROPion (WELLBUTRIN XL) 150 MG 24 hr tablet  citalopram (CELEXA) 10 MG tablet  citalopram (CELEXA) 40 MG tablet  citalopram (CELEXA) 40 MG tablet  colchicine (COLCYRS) 0.6 MG tablet  colchicine (MITIGARE) 0.6 MG capsule  escitalopram (LEXAPRO) 20 MG tablet  escitalopram (LEXAPRO) 20 MG tablet  hydrOXYzine (ATARAX) 25 MG tablet  ibuprofen (ADVIL,MOTRIN) 200 MG tablet  lisinopril " (ZESTRIL) 10 MG tablet  lisinopril (ZESTRIL) 30 MG tablet  metFORMIN (GLUCOPHAGE) 500 MG tablet  metFORMIN (GLUCOPHAGE) 500 MG tablet  naloxone (NARCAN) 4 MG/0.1ML nasal spray  NARCAN 4 MG/0.1ML nasal spray  OLANZapine (ZYPREXA) 5 MG tablet      Allergies   Allergen Reactions     Penicillins Unknown     Family History  Family History   Problem Relation Age of Onset     Hypertension Mother      Hypertension Father      Social History   Social History     Tobacco Use     Smoking status: Some Days     Packs/day: 0.50     Types: Cigarettes     Smokeless tobacco: Never     Tobacco comments:     E CIG   Substance Use Topics     Alcohol use: Yes     Drug use: Not Currently     Types: Methamphetamines     Comment: Drug use: hx of meth use       Past medical history, past surgical history, medications, allergies, family history, and social history were reviewed with the patient. No additional pertinent items.      A medically appropriate review of systems was performed with pertinent positives and negatives noted in the HPI, and all other systems negative.    Physical Exam   BP: (!) 149/75  Pulse: 84  Temp: 98.3  F (36.8  C)  Resp: 18  SpO2: 98 %  Physical Exam  General: No acute distress. Appears stated age.   HENT: MMM, no oropharyngeal lesions  Eyes: PERRL, normal sclerae   Cardio: Regular rate, extremities well perfused  Resp: Normal work of breathing, normal respiratory rate  Neuro: alert and fully oriented. CN II-XII grossly intact. Grossly normal strength and sensation in all extremities.   MSK: no deformities. Grossly normal ROM in extremities.   Integumentary/Skin: no rash visualized, normal color.  Foot exam unremarkable, no blistering or skin breakdown.  Psych: paranoid, denies SI/HI    ED Course, Procedures, & Data      Procedures                     No results found for any visits on 01/07/23.  Medications - No data to display  Labs Ordered and Resulted from Time of ED Arrival to Time of ED Departure - No data  to display  No orders to display          Medical Decision Making  The patient presented with a problem that is a chronic illness severe exacerbation, progression, or side effect of treatment.    The patient's evaluation involved:  discussion of management or test interpretation with another health professional (see separate area of note for details)    The patient's management involved a decision regarding hospitalization.      Assessment & Plan    Chu Pimentel is a 37-year-old male with history of anxiety and depression presenting with mental health concerns.  He has been off psychiatric medications and would like to speak to a mental health .  Denies suicidal and homicidal ideation.  His vital signs on arrival are within normal limits aside from mild hypertension.  Reports he has been off his medications.  He does have some bilateral foot pain as he has been up walking frequently.  Assessed his feet and no concern for frostbite at this time.    He is awaiting DEC assessment at the time of of transfer of care to oncoming provider, Dr. Pederson.    I have reviewed the nursing notes. I have reviewed the findings, diagnosis, plan and need for follow up with the patient.    New Prescriptions    No medications on file       Final diagnoses:   Paranoia (H)   Methamphetamine use (H)       Zuleima Basilio MD  [facility] EMERGENCY DEPARTMENT  1/7/2023     Zuleima Basilio MD  01/08/23 0211

## 2023-01-08 NOTE — DISCHARGE INSTRUCTIONS
Substance Use, Mental Health, and General Health Resources:  [facility]- [phone]  [facility]- [phone] (to get connected to [facility] services and programs)  Chemical Health Assessment- [facility], [facility]- [phone]    Clinics that have general medical care, mental health treatment, and case management services  [facility])- schedule an appointment by calling [phone]; [address] [facility]- schedule an appointment by calling [phone]; [facility] [facility]- [phone]      SUBSTANCE RELATED PREVENTION PLAN     Things that trigger me to use chemicals: agitation, frustration, being in an environment where drugs are present, increased anxiety, depressive symptoms     Ways in which I can avoid triggering experiences: spend time with friends/family who do not use, use distraction techniques (e.g. exercise, reading, mindfulness)      Coping tools I can utilize when experiencing cravings to use chemicals of abuse:   The box breathing method     1. Close your eyes. Breathe in through your nose while counting to four slowly. ...     2. Hold your breath inside while counting slowly to four. Try not to clamp your mouth or nose shut.     3. Begin to slowly exhale for 4 seconds.     4. Repeat steps 1 to 3 at least 10 times.           Use your five senses to help you move through distress.     1. Put your hands in cold water. ...     2.  or touch items near you. ...     3. Breathe deeply. ...     4. Savor a food or drink. ...     5. Take a short walk. ...     6. Hold a piece of ice. ...     7. Savor a scent. ...     8. Move your body.           5,4,3,2,1 Grounding Exercise     Grounding is a technique that  helps us reorient to the here-and-now, to bring up into the present. They are a useful technique if you ever feel overwhelmed, intensely anxious, or dissociated from your environment. The  5,4,3,2,1  exercise is a common sensory awareness grounding exercise that many find helpful to relax or get through difficult moments.     1. Describe 5 things you can see in the room     2. Name 4 things you can feel ( my feet on the floor  or  the air in my nose )     3. Name 3 things you are hear right now ( traffic outside )     4. Name 2 things you can smell right now (or 2 smells that you like)     5. Name 1 thing you like about yourself            5 4 3 2 1 CALM exercise to engage your senses:     5: Acknowledge FIVE things you see around you. Maybe it is a bird, maybe it is pencil, maybe it is a spot on the ceiling, however big or small, state 5 things you see.     4: Acknowledge FOUR things you can touch around you. Maybe this is your hair, hands, ground, grass, pillow, etc, whatever it may be, list out the 4 things you can feel.     3: Acknowledge THREE things you hear. This needs to be external, do not focus on your thoughts; maybe you can hear a clock, a car, a dog park. or maybe you hear your tummy rumbling, internal noises that make external sounds can count, what is audible in the moment is what you list.     2: Acknowledge TWO things you can smell: This one might be hard if you are not in a stimulating environment, if you cannot automatically sniff something out, walk nearby to find a scent. Maybe you walk to your bathroom to smell soap or outside to smell anything in nature, or even could be as simple as leaning over and smelling a pillow on the couch, or a pencil. Whatever it may be, take in the smells around you.     1. Acknowledge ONE thing you can taste. What does the inside of your mouth taste like, gum, coffee, or the sandwich from lunch? Focus on your mouth as the last step and take in what you can taste.        People in my life that I can ask for help: friends, family, community providers     Changes I can make to support my physical and emotional health as it pertains to substance use: refrain from substance use, avoid situations where drugs are present, complete a chemical health assessment, attend all appointments as scheduled, take medications as prescribed      Other things that can help: I will attempt to avoid excessive use of mood altering chemicals with the understanding that while in crisis, they exacerbate social and/or emotional concerns.      If I am feeling unsafe or I am in a crisis, I will:      Contact my established care providers    Call the National Suicide Prevention Lifeline: [phone]      Go to the nearest emergency room      and/or    Call 001    Additional information:  Today you were seen by a licensed mental health professional through Triage and Transition services, [facility] (P)  for a crisis assessment in the Emergency Department at [facility].  It is recommended that you follow up with your established providers (psychiatrist, mental health therapist, and/or primary care doctor - as relevant) as soon as possible. Coordinators from [facility] will be calling you in the next 24-48 hours to ensure that you have the resources you need.  You can also contact [facility] coordinators directly at [phone]. You may have been scheduled for or offered an appointment with a mental health provider. [facility] maintains an extensive network of licensed behavioral health providers to connect patients with the services they need.  We do not charge providers a fee to participate in our referral network.  We match patients with providers based on a patient's specific needs, insurance coverage, and location.  Our first effort will be to refer you to a provider within your care system, and will utilize providers outside your care system as needed.

## 2023-01-08 NOTE — ED TRIAGE NOTES
"Patient ambulatory to triage for \"health and wellness.\" Patient states he wants to detox. Patient also wants to start some medications for his depression and anxiety.       "

## 2023-01-10 ENCOUNTER — HOSPITAL ENCOUNTER (EMERGENCY)
Facility: HOSPITAL | Age: 38
Discharge: HOME | End: 2023-01-10
Attending: EMERGENCY MEDICINE | Admitting: EMERGENCY MEDICINE
Payer: MEDICAID

## 2023-01-10 VITALS
OXYGEN SATURATION: 95 % | HEART RATE: 75 BPM | BODY MASS INDEX: 42.95 KG/M2 | TEMPERATURE: 98.8 F | SYSTOLIC BLOOD PRESSURE: 154 MMHG | DIASTOLIC BLOOD PRESSURE: 80 MMHG | HEIGHT: 69 IN | RESPIRATION RATE: 20 BRPM | WEIGHT: 290 LBS

## 2023-01-10 DIAGNOSIS — F15.10 METHAMPHETAMINE ABUSE (H): ICD-10-CM

## 2023-01-10 DIAGNOSIS — Z59.00 HOMELESSNESS: ICD-10-CM

## 2023-01-10 PROCEDURE — 99285 EMERGENCY DEPT VISIT HI MDM: CPT

## 2023-01-10 SDOH — ECONOMIC STABILITY - HOUSING INSECURITY: HOMELESSNESS UNSPECIFIED: Z59.00

## 2023-01-10 NOTE — ED TRIAGE NOTES
Pt here d/t pain in feet. States they are cold. Reports he has no place to go for tonight and needs to warm up. Pt confrontational with his speech. Prior to checking in, Pt was talking with security about why he was approached when he came in the front door and tried to use the microwave.      Triage Assessment     Row Name 01/10/23 0017       Triage Assessment (Adult)    Airway WDL WDL

## 2023-01-10 NOTE — ED PROVIDER NOTES
NAME: Chu Pimentel  AGE: 37 year old male  YOB: 1985  MRN: [id number]  EVALUATION DATE & TIME: 1/10/2023  1:25 AM    PCP: No Ref-Primary, Physician    ED PROVIDER: Alfonso Glaser M.D.      Chief Complaint   Patient presents with     Mental Health Problem         FINAL IMPRESSION:  1. Methamphetamine abuse (H)    2. Homelessness        MEDICAL DECISION MAKIN:31 AM I met with the patient, obtained history, performed an initial exam, and discussed options and plan for diagnostics and treatment here in the ED.   2:09 AM Patient does not want to stay in his room and would like to be discharged.  Patient was clinically assessed and consented to treatment. After assessment, medical decision making and workup were discussed with the patient. The patient was agreeable to plan for testing, workup, and treatment.  Pertinent Labs & Imaging studies reviewed. (See chart for details)       Medical Decision Making    History:    Supplemental history from: N/A    External Record(s) reviewed: Documented in HPI, if applicable.    Work Up:    Chart documentation includes differential considered and any EKGs or imaging independently interpreted by provider.    In additional to work up documented, I considered the following work up: See chart documentation, if applicable.    External consultation:    Discussion of management with another provider: See chart documentation, if applicable    Complicating factors:    Care impacted by chronic illness: Mental Health and Other: Methamphetamine abuse    Care affected by social determinants of health: Alcohol Abuse and/or Recreational Drug Use and Housing Insecurity    Disposition considerations: Discharge. No recommendations on prescription strength medication(s). N/A.    Chu Pimentel is a 37 year old male who presents with mental health concern.   Differential diagnosis includes but not limited to suicidal ideation, depression, psychosis, methamphetamine abuse,  "drug-induced psychosis, homelessness.  Patient appears otherwise healthy but refused any examination.  Patient reports being \"charged\" and uncertain if somebody touches him what might happen.  Patient does not appear disorganized and did bring himself in.  He is able to understand that it is cold out and his feet were cold and that is why he came in.  He reports frustration with his homelessness and continued meth use however does report recent methamphetamine use.  Patient does appear somewhat impulsive and agitated initially but then calm and while I spoke with him.  He would like treatment and does not report any suicidal or homicidal ideations.  Patient does not appear to be responding to internal stimuli and is cooperative at this time however I did warn him regarding his cooperation that he will need to comply and remain in his room which patient continues to attempting to relieve his room.  Patient understands that he is to stay in his room but continues leaving and demanding or yelling at staff.  Patient not holdable at this time and given symptoms likely coming down from methamphetamines as he yawned multiple times during my interactions with him repeatedly as he crashes from the stimulant use.  He keeps reporting that he would like to lay down and be left alone which I did offer him while I try to obtain a detox or treatment bed for him.  Attempts at [facility] were unsuccessful as patient is on there were no admit list.  I was in the process of attempting to try to contact [facility] or [place] chemical dependence however patient then left his room in the skin in the hallway trying to go into another patient's room.  Staff tried to redirect him and calm him however patient becoming angry and screaming at staff accusing and becoming combative.  Security was called and at this time I do not feel patient warrants mental health admission or restraint and sedation as he is not holdable.  Patient is " "not psychotic nor disorganized and presently behaviorally not complying with recommendations in the ER.  Patient informed that he would be discharged and reports that he would go lay down in the lobby.  Security informed him that if he is discharged he will not be allowed to lay down in the lobby and due to his threats and aggressive behavior police will be called.  Patient discharged.    0 minutes of critical care time    MEDICATIONS GIVEN IN THE EMERGENCY:  Medications - No data to display    NEW PRESCRIPTIONS STARTED AT TODAY'S ER VISIT:  Discharge Medication List as of 1/10/2023  2:12 AM             =================================================================    HPI    Patient information was obtained from: Patient    Use of : N/A    HPI limited as patient would go off on tangents (crashing [employer] currency, etc.)  Chu Pimentel is a 37 year old male with a past medical history of anxiety, recurrent major depressive disorder, tobacco abuse, methamphetamine abuse, and hypertension, who presents with a mental health problem. Patient states that he is homeless and was out in the cold prior to arrival. His feet are cold and he needs a place to rest as well as help getting into treatment. He did use methamphetamine earlier and still feels \"charged\". He does not want to be examined. Denies suicidal ideation. Denies any pain or injuries.    Of note, patient feels depressed as his daughters birthday is tomorrow and he has not seen her in a while due to his drug addiction. He believes that things will get better if he gets help.      REVIEW OF SYSTEMS   Review of Systems   Unable to perform ROS: Psychiatric disorder   Musculoskeletal:        Feet cold   Psychiatric/Behavioral: Negative for suicidal ideas.        PAST MEDICAL HISTORY:  Past Medical History:   Diagnosis Date     Anxiety      Depression      Hypertension      Obesity        PAST SURGICAL HISTORY:  Past Surgical History:   Procedure " Laterality Date     CHOLECYSTECTOMY 2012       CURRENT MEDICATIONS:    No current facility-administered medications for this encounter.    Current Outpatient Medications:      amLODIPine (NORVASC) 10 MG tablet, Take 1 tablet (10 mg) by mouth daily, Disp: 30 tablet, Rfl: 3     amphetamine-dextroamphetamine (ADDERALL) 10 MG tablet, Take 1 tablet (10 mg) by mouth daily, Disp: 30 tablet, Rfl: 0     amphetamine-dextroamphetamine (ADDERALL) 30 MG tablet, Take 1 tablet (30 mg) by mouth daily, Disp: 30 tablet, Rfl: 0     atomoxetine (STRATTERA) 100 MG capsule, Take 100 mg by mouth, Disp: , Rfl:      atomoxetine (STRATTERA) 100 MG capsule, , Disp: , Rfl:      bacitracin 500 UNIT/GM external ointment, Apply thin layer to affected area twice daily, Disp: , Rfl:      Blood Pressure Monitoring (BLOOD PRESSURE CUFF) MISC, 1 Units daily, Disp: 1 each, Rfl: 0     buprenorphine-naloxone (SUBOXONE) 8-2 MG SUBL sublingual tablet, , Disp: , Rfl:      buPROPion (WELLBUTRIN SR) 100 MG 12 hr tablet, , Disp: , Rfl:      buPROPion (WELLBUTRIN XL) 150 MG 24 hr tablet, , Disp: , Rfl:      citalopram (CELEXA) 10 MG tablet, Take 4 tablets by mouth daily, Disp: , Rfl:      citalopram (CELEXA) 40 MG tablet, Take 1 tablet (40 mg) by mouth daily for 30 days, Disp: 30 tablet, Rfl: 0     citalopram (CELEXA) 40 MG tablet, [CITALOPRAM (CELEXA) 40 MG TABLET] , Disp: , Rfl:      colchicine (COLCYRS) 0.6 MG tablet, TAKE ONE TABLET BY MOUTH THREE TIMES DAILY FOR 2 DAYS THEN TAKE ONE TABLET TWICE DAILY, Hold for diarrhea., Disp: , Rfl:      colchicine (MITIGARE) 0.6 MG capsule, , Disp: , Rfl:      escitalopram (LEXAPRO) 20 MG tablet, Take 20 mg by mouth, Disp: , Rfl:      escitalopram (LEXAPRO) 20 MG tablet, , Disp: , Rfl:      hydrOXYzine (ATARAX) 25 MG tablet, , Disp: , Rfl:      ibuprofen (ADVIL,MOTRIN) 200 MG tablet, [IBUPROFEN (ADVIL,MOTRIN) 200 MG TABLET] Take 200-400 mg by mouth., Disp: , Rfl:      lisinopril (ZESTRIL) 10 MG tablet, Take 3 tablets  "(30 mg) by mouth daily for 30 days, Disp: 90 tablet, Rfl: 0     lisinopril (ZESTRIL) 30 MG tablet, Take 1 tablet (30 mg) by mouth daily, Disp: 30 tablet, Rfl: 11     metFORMIN (GLUCOPHAGE) 500 MG tablet, Take 500 mg by mouth, Disp: , Rfl:      metFORMIN (GLUCOPHAGE) 500 MG tablet, , Disp: , Rfl:      naloxone (NARCAN) 4 MG/0.1ML nasal spray, Spray 1 spray in nostril, Disp: , Rfl:      NARCAN 4 MG/0.1ML nasal spray, , Disp: , Rfl:      OLANZapine (ZYPREXA) 5 MG tablet, Take 1 tablet (5 mg) by mouth every 6 hours as needed (agitation/psychosis), Disp: 16 tablet, Rfl: 0    ALLERGIES:  Allergies   Allergen Reactions     Penicillins Unknown       FAMILY HISTORY:  Family History   Problem Relation Age of Onset     Hypertension Mother      Hypertension Father        SOCIAL HISTORY:   Social History     Socioeconomic History     Marital status: Single   Tobacco Use     Smoking status: Some Days     Packs/day: 0.50     Types: Cigarettes     Smokeless tobacco: Never     Tobacco comments:     E CIG   Substance and Sexual Activity     Alcohol use: Yes     Drug use: Not Currently     Types: Methamphetamines     Comment: Drug use: hx of meth use    Social History Narrative    7/15/2020        PHYSICAL EXAM:    Vitals: BP (!) 154/80   Pulse 75   Temp 98.8  F (37.1  C) (Temporal)   Resp 20   Ht 1.753 m (5' 9\")   Wt 131.5 kg (290 lb)   SpO2 95%   BMI 42.83 kg/m     Physical Exam  Vitals and nursing note reviewed.   Constitutional:       General: He is not in acute distress.     Appearance: Normal appearance. He is normal weight. He is not ill-appearing or toxic-appearing.   HENT:      Head: Atraumatic.   Pulmonary:      Effort: No respiratory distress.   Musculoskeletal:         General: No signs of injury.   Neurological:      General: No focal deficit present.      Mental Status: He is alert.      Gait: Gait normal.      Comments: No gross defect observed.   Psychiatric:         Mood and Affect: Affect is blunt and angry.   "       Speech: Speech normal.         Behavior: Behavior is uncooperative, agitated, aggressive and combative.         Thought Content: Thought content is not paranoid or delusional. Thought content does not include homicidal or suicidal ideation.         Cognition and Memory: Cognition is not impaired. Memory is not impaired.         Judgment: Judgment is impulsive and inappropriate.           LAB:  All pertinent labs reviewed and interpreted.  Labs Ordered and Resulted from Time of ED Arrival to Time of ED Departure - No data to display    RADIOLOGY:  No orders to display       PROCEDURES:   Procedures       I, Kristyn Kim, am serving as a scribe to document services personally performed by Dr. Alfonso Glaser  based on my observation and the provider's statements to me. I, Alfonso Glaser MD attest that Kristyn Kim is acting in a scribe capacity, has observed my performance of the services and has documented them in accordance with my direction.      Alfonso Glaser M.D.  Emergency Medicine  [facility] Emergency Department     Alfonso Glaser MD  01/10/23 7620

## 2023-01-10 NOTE — ED NOTES
Bed: JNED-07  Expected date: 1/10/23  Expected time:   Means of arrival:   Comments:  Hold for mental health

## 2023-01-10 NOTE — ED NOTES
"Pt was slamming a chair around in triage. Writer asked if Pt had taken any drugs today. Pt ignored writer and began slamming his elbow into the back of the chair in which he was sitting stating \"I have ADHD.\" Writer stated \"That may be, but you can't destroy property\". Pt continues to make physically threatening statements and physically threatening motions.   "

## 2023-02-11 ENCOUNTER — HEALTH MAINTENANCE LETTER (OUTPATIENT)
Age: 38
End: 2023-02-11

## 2023-03-10 ENCOUNTER — HOSPITAL ENCOUNTER (OUTPATIENT)
Facility: CLINIC | Age: 38
Setting detail: OBSERVATION
Discharge: HOME | End: 2023-03-22
Attending: EMERGENCY MEDICINE | Admitting: INTERNAL MEDICINE
Payer: MEDICAID

## 2023-03-10 DIAGNOSIS — T69.029A TRENCH FOOT, UNSPECIFIED LATERALITY, INITIAL ENCOUNTER: ICD-10-CM

## 2023-03-10 DIAGNOSIS — F15.10 METHAMPHETAMINE ABUSE (H): ICD-10-CM

## 2023-03-10 DIAGNOSIS — Z91.89 AT RISK FOR UNSAFE BEHAVIOR: ICD-10-CM

## 2023-03-10 DIAGNOSIS — F22 PARANOID IDEATION (H): ICD-10-CM

## 2023-03-10 DIAGNOSIS — U07.1 INFECTION DUE TO 2019 NOVEL CORONAVIRUS: ICD-10-CM

## 2023-03-10 PROCEDURE — 99285 EMERGENCY DEPT VISIT HI MDM: CPT | Performed by: EMERGENCY MEDICINE

## 2023-03-10 PROCEDURE — C9803 HOPD COVID-19 SPEC COLLECT: HCPCS | Performed by: EMERGENCY MEDICINE

## 2023-03-10 PROCEDURE — 99285 EMERGENCY DEPT VISIT HI MDM: CPT | Mod: 25 | Performed by: EMERGENCY MEDICINE

## 2023-03-10 RX ORDER — PRENATAL VIT 91/IRON/FOLIC/DHA 28-975-200
COMBINATION PACKAGE (EA) ORAL 2 TIMES DAILY
Status: DISCONTINUED | OUTPATIENT
Start: 2023-03-11 | End: 2023-03-22 | Stop reason: HOSPADM

## 2023-03-11 ENCOUNTER — TELEPHONE (OUTPATIENT)
Dept: BEHAVIORAL HEALTH | Facility: CLINIC | Age: 38
End: 2023-03-11
Payer: MEDICAID

## 2023-03-11 PROBLEM — F22 PARANOID IDEATION (H): Status: ACTIVE | Noted: 2023-03-11

## 2023-03-11 PROBLEM — U07.1 INFECTION DUE TO 2019 NOVEL CORONAVIRUS: Status: ACTIVE | Noted: 2023-03-11

## 2023-03-11 PROBLEM — T69.029A: Status: ACTIVE | Noted: 2023-03-11

## 2023-03-11 PROBLEM — Z91.89 AT RISK FOR UNSAFE BEHAVIOR: Status: ACTIVE | Noted: 2023-03-11

## 2023-03-11 PROBLEM — F15.10 METHAMPHETAMINE ABUSE (H): Status: ACTIVE | Noted: 2023-03-11

## 2023-03-11 LAB
ALBUMIN SERPL BCG-MCNC: 3.9 G/DL (ref 3.5–5.2)
ALP SERPL-CCNC: 62 U/L (ref 40–129)
ALT SERPL W P-5'-P-CCNC: 13 U/L (ref 10–50)
ANION GAP SERPL CALCULATED.3IONS-SCNC: 12 MMOL/L (ref 7–15)
AST SERPL W P-5'-P-CCNC: 17 U/L (ref 10–50)
BASOPHILS # BLD AUTO: 0 10E3/UL (ref 0–0.2)
BASOPHILS NFR BLD AUTO: 1 %
BILIRUB SERPL-MCNC: 0.4 MG/DL
BUN SERPL-MCNC: 9.8 MG/DL (ref 6–20)
CALCIUM SERPL-MCNC: 8.7 MG/DL (ref 8.6–10)
CHLORIDE SERPL-SCNC: 105 MMOL/L (ref 98–107)
CREAT SERPL-MCNC: 0.73 MG/DL (ref 0.67–1.17)
CRP SERPL-MCNC: 3.7 MG/L
D DIMER PPP FEU-MCNC: 0.31 UG/ML FEU (ref 0–0.5)
DEPRECATED HCO3 PLAS-SCNC: 25 MMOL/L (ref 22–29)
EOSINOPHIL # BLD AUTO: 0.2 10E3/UL (ref 0–0.7)
EOSINOPHIL NFR BLD AUTO: 3 %
ERYTHROCYTE [DISTWIDTH] IN BLOOD BY AUTOMATED COUNT: 14.7 % (ref 10–15)
GFR SERPL CREATININE-BSD FRML MDRD: >90 ML/MIN/1.73M2
GLUCOSE SERPL-MCNC: 156 MG/DL (ref 70–99)
HCT VFR BLD AUTO: 44.1 % (ref 40–53)
HGB BLD-MCNC: 14.1 G/DL (ref 13.3–17.7)
HOLD SPECIMEN: NORMAL
HOLD SPECIMEN: NORMAL
IMM GRANULOCYTES # BLD: 0 10E3/UL
IMM GRANULOCYTES NFR BLD: 0 %
LYMPHOCYTES # BLD AUTO: 2.5 10E3/UL (ref 0.8–5.3)
LYMPHOCYTES NFR BLD AUTO: 34 %
MCH RBC QN AUTO: 28.3 PG (ref 26.5–33)
MCHC RBC AUTO-ENTMCNC: 32 G/DL (ref 31.5–36.5)
MCV RBC AUTO: 88 FL (ref 78–100)
MONOCYTES # BLD AUTO: 0.7 10E3/UL (ref 0–1.3)
MONOCYTES NFR BLD AUTO: 10 %
NEUTROPHILS # BLD AUTO: 4 10E3/UL (ref 1.6–8.3)
NEUTROPHILS NFR BLD AUTO: 52 %
NRBC # BLD AUTO: 0 10E3/UL
NRBC BLD AUTO-RTO: 0 /100
PLATELET # BLD AUTO: 272 10E3/UL (ref 150–450)
POTASSIUM SERPL-SCNC: 3.3 MMOL/L (ref 3.4–5.3)
PROT SERPL-MCNC: 6.6 G/DL (ref 6.4–8.3)
RBC # BLD AUTO: 4.99 10E6/UL (ref 4.4–5.9)
SARS-COV-2 RNA RESP QL NAA+PROBE: POSITIVE
SODIUM SERPL-SCNC: 142 MMOL/L (ref 136–145)
WBC # BLD AUTO: 7.5 10E3/UL (ref 4–11)

## 2023-03-11 PROCEDURE — 250N000013 HC RX MED GY IP 250 OP 250 PS 637

## 2023-03-11 PROCEDURE — U0003 INFECTIOUS AGENT DETECTION BY NUCLEIC ACID (DNA OR RNA); SEVERE ACUTE RESPIRATORY SYNDROME CORONAVIRUS 2 (SARS-COV-2) (CORONAVIRUS DISEASE [COVID-19]), AMPLIFIED PROBE TECHNIQUE, MAKING USE OF HIGH THROUGHPUT TECHNOLOGIES AS DESCRIBED BY CMS-2020-01-R: HCPCS | Performed by: FAMILY MEDICINE

## 2023-03-11 PROCEDURE — 36415 COLL VENOUS BLD VENIPUNCTURE: CPT

## 2023-03-11 PROCEDURE — 90791 PSYCH DIAGNOSTIC EVALUATION: CPT

## 2023-03-11 PROCEDURE — 85379 FIBRIN DEGRADATION QUANT: CPT

## 2023-03-11 PROCEDURE — 99207 PR APP CREDIT; MD BILLING SHARED VISIT: CPT | Mod: FS | Performed by: INTERNAL MEDICINE

## 2023-03-11 PROCEDURE — 80053 COMPREHEN METABOLIC PANEL: CPT

## 2023-03-11 PROCEDURE — 85025 COMPLETE CBC W/AUTO DIFF WBC: CPT

## 2023-03-11 PROCEDURE — 86140 C-REACTIVE PROTEIN: CPT

## 2023-03-11 PROCEDURE — 99418 PROLNG IP/OBS E/M EA 15 MIN: CPT | Mod: FS

## 2023-03-11 PROCEDURE — 99223 1ST HOSP IP/OBS HIGH 75: CPT | Mod: FS

## 2023-03-11 PROCEDURE — G0378 HOSPITAL OBSERVATION PER HR: HCPCS

## 2023-03-11 RX ORDER — ACETAMINOPHEN 325 MG/1
650 TABLET ORAL EVERY 6 HOURS PRN
Status: DISCONTINUED | OUTPATIENT
Start: 2023-03-11 | End: 2023-03-22 | Stop reason: HOSPADM

## 2023-03-11 RX ORDER — BISACODYL 5 MG
5 TABLET, DELAYED RELEASE (ENTERIC COATED) ORAL DAILY PRN
Status: DISCONTINUED | OUTPATIENT
Start: 2023-03-11 | End: 2023-03-22 | Stop reason: HOSPADM

## 2023-03-11 RX ORDER — BISACODYL 5 MG
10 TABLET, DELAYED RELEASE (ENTERIC COATED) ORAL DAILY PRN
Status: DISCONTINUED | OUTPATIENT
Start: 2023-03-11 | End: 2023-03-22 | Stop reason: HOSPADM

## 2023-03-11 RX ORDER — ACETAMINOPHEN 325 MG/1
650 TABLET ORAL EVERY 6 HOURS PRN
Status: DISCONTINUED | OUTPATIENT
Start: 2023-03-11 | End: 2023-03-11

## 2023-03-11 RX ORDER — ONDANSETRON 4 MG/1
4 TABLET, ORALLY DISINTEGRATING ORAL EVERY 6 HOURS PRN
Status: DISCONTINUED | OUTPATIENT
Start: 2023-03-11 | End: 2023-03-22 | Stop reason: HOSPADM

## 2023-03-11 RX ORDER — ENOXAPARIN SODIUM 100 MG/ML
40 INJECTION SUBCUTANEOUS EVERY 24 HOURS
Status: DISCONTINUED | OUTPATIENT
Start: 2023-03-12 | End: 2023-03-20

## 2023-03-11 RX ORDER — PROCHLORPERAZINE MALEATE 5 MG
10 TABLET ORAL EVERY 6 HOURS PRN
Status: DISCONTINUED | OUTPATIENT
Start: 2023-03-11 | End: 2023-03-22 | Stop reason: HOSPADM

## 2023-03-11 RX ORDER — POTASSIUM CHLORIDE 750 MG/1
40 TABLET, EXTENDED RELEASE ORAL ONCE
Status: COMPLETED | OUTPATIENT
Start: 2023-03-11 | End: 2023-03-11

## 2023-03-11 RX ORDER — PROCHLORPERAZINE 25 MG
25 SUPPOSITORY, RECTAL RECTAL EVERY 12 HOURS PRN
Status: DISCONTINUED | OUTPATIENT
Start: 2023-03-11 | End: 2023-03-22 | Stop reason: HOSPADM

## 2023-03-11 RX ORDER — ONDANSETRON 2 MG/ML
4 INJECTION INTRAMUSCULAR; INTRAVENOUS EVERY 6 HOURS PRN
Status: DISCONTINUED | OUTPATIENT
Start: 2023-03-11 | End: 2023-03-22 | Stop reason: HOSPADM

## 2023-03-11 RX ORDER — ACETAMINOPHEN 650 MG/1
650 SUPPOSITORY RECTAL EVERY 6 HOURS PRN
Status: DISCONTINUED | OUTPATIENT
Start: 2023-03-11 | End: 2023-03-11

## 2023-03-11 RX ORDER — POTASSIUM CHLORIDE 1.5 G/1.58G
40 POWDER, FOR SOLUTION ORAL ONCE
Status: DISCONTINUED | OUTPATIENT
Start: 2023-03-11 | End: 2023-03-14

## 2023-03-11 RX ORDER — FLUTICASONE PROPIONATE 50 MCG
2 SPRAY, SUSPENSION (ML) NASAL DAILY
Status: DISCONTINUED | OUTPATIENT
Start: 2023-03-12 | End: 2023-03-22 | Stop reason: HOSPADM

## 2023-03-11 RX ORDER — ACETAMINOPHEN 650 MG/1
650 SUPPOSITORY RECTAL EVERY 6 HOURS PRN
Status: DISCONTINUED | OUTPATIENT
Start: 2023-03-11 | End: 2023-03-22 | Stop reason: HOSPADM

## 2023-03-11 RX ORDER — PRENATAL VIT 91/IRON/FOLIC/DHA 28-975-200
COMBINATION PACKAGE (EA) ORAL 2 TIMES DAILY
Qty: 42 G | Refills: 0 | Status: SHIPPED | OUTPATIENT
Start: 2023-03-11 | End: 2023-08-26

## 2023-03-11 RX ADMIN — POTASSIUM CHLORIDE 40 MEQ: 750 TABLET, EXTENDED RELEASE ORAL at 23:14

## 2023-03-11 RX ADMIN — Medication 5 MG: at 23:14

## 2023-03-11 ASSESSMENT — COLUMBIA-SUICIDE SEVERITY RATING SCALE - C-SSRS
ATTEMPT LIFETIME: NO
4. HAVE YOU HAD THESE THOUGHTS AND HAD SOME INTENTION OF ACTING ON THEM?: NO
2. HAVE YOU ACTUALLY HAD ANY THOUGHTS OF KILLING YOURSELF?: YES
5. HAVE YOU STARTED TO WORK OUT OR WORKED OUT THE DETAILS OF HOW TO KILL YOURSELF? DO YOU INTEND TO CARRY OUT THIS PLAN?: NO
6. HAVE YOU EVER DONE ANYTHING, STARTED TO DO ANYTHING, OR PREPARED TO DO ANYTHING TO END YOUR LIFE?: NO
4. HAVE YOU HAD THESE THOUGHTS AND HAD SOME INTENTION OF ACTING ON THEM?: NO
1. IN THE PAST MONTH, HAVE YOU WISHED YOU WERE DEAD OR WISHED YOU COULD GO TO SLEEP AND NOT WAKE UP?: YES
1. IN THE PAST MONTH, HAVE YOU WISHED YOU WERE DEAD OR WISHED YOU COULD GO TO SLEEP AND NOT WAKE UP?: YES
ATTEMPT LIFETIME: NO
4. HAVE YOU HAD THESE THOUGHTS AND HAD SOME INTENTION OF ACTING ON THEM?: NO
3. HAVE YOU BEEN THINKING ABOUT HOW YOU MIGHT KILL YOURSELF?: YES
5. HAVE YOU STARTED TO WORK OUT OR WORKED OUT THE DETAILS OF HOW TO KILL YOURSELF? DO YOU INTEND TO CARRY OUT THIS PLAN?: NO
TOTAL  NUMBER OF ABORTED OR SELF INTERRUPTED ATTEMPTS LIFETIME: NO
1. HAVE YOU WISHED YOU WERE DEAD OR WISHED YOU COULD GO TO SLEEP AND NOT WAKE UP?: YES
2. HAVE YOU ACTUALLY HAD ANY THOUGHTS OF KILLING YOURSELF?: YES
6. HAVE YOU EVER DONE ANYTHING, STARTED TO DO ANYTHING, OR PREPARED TO DO ANYTHING TO END YOUR LIFE?: NO
5. HAVE YOU STARTED TO WORK OUT OR WORKED OUT THE DETAILS OF HOW TO KILL YOURSELF? DO YOU INTEND TO CARRY OUT THIS PLAN?: NO
TOTAL  NUMBER OF INTERRUPTED ATTEMPTS LIFETIME: NO
2. HAVE YOU ACTUALLY HAD ANY THOUGHTS OF KILLING YOURSELF?: YES
TOTAL  NUMBER OF ABORTED OR SELF INTERRUPTED ATTEMPTS LIFETIME: NO

## 2023-03-11 ASSESSMENT — ACTIVITIES OF DAILY LIVING (ADL)
ADLS_ACUITY_SCORE: 35

## 2023-03-11 NOTE — DISCHARGE INSTRUCTIONS
Thank you for coming to the [facility] Emergency Department.     For your feet:  Keep them dry and wear clean socks as much as possible.   Twice daily apply the antifungal cream for at least 2 weeks.

## 2023-03-11 NOTE — TELEPHONE ENCOUNTER
"R: 3:30PM Per Dr Romero in the ED, pt is Covid positive.  Pt can not be placed on a mental health unit for 10 days after a positive result, when pt is covid recovered.  Pt removed from active PPS worklist and moved to the \"covid +\" tab.  If placement is still needed per psych after pt is covid recovered, call PPS to get pt back on active work list.  Dr Romero will seek medical placement for pt.    "

## 2023-03-11 NOTE — TELEPHONE ENCOUNTER
S: [facility]  Marcie calling at 12:57 PM about a 38 year old/Male presenting with meth use         B: Pt arrived via Self . Presenting problem, stressors: Pt presenting with paranoia, worsening mood, increasing psychosis, will not participate in safety planning, believes that people have stolen his identity. Pt is off his medication and is requesting voluntary [facility] to help him stabilize. Pt doesn't feel safe to leave the hospital.     Pt affect in ED: Guarded, Irritable  and Paranoid  Pt Dx: Major Depressive Disorder, Generalized Anxiety Disorder, ADHD and Substance Use Disorder: amphetamine use disorder  Previous [facility] hx? Yes- dates unknown    Pt endorses passive SI - has thought about going outside and dying from exposure  Hx of suicide attempt? No  Pt has a remote hx of SIB via headbanging to get voices to stop.   Pt denies HI   Pt endorses auditory hallucinations .     Hx of aggression/violence, sexual offences, legal concerns, or Epic care plan? describe: none that  could find  Current concerns for aggression this visit? No  Does pt have a history of Civil Commitment? unknown  Is Pt their own guardian? Yes    Pt is prescribed medication. Is patient medication compliant? No  Pt denies OP services   CD concerns: Actively using/consuming meth  Acute or chronic medical concerns: None  Does Pt present with specific needs, assistive devices, or exclusionary criteria? None      Pt is ambulatory  Pt is able to perform ADLs independently      A: Pt to be reviewed for [facility] admission. Pt is Voluntary  Preferred placement: Statewide    COVID:Not yet ordered, Intake to request lab   Utox: Ordered, not yet collected   CMP: N/A  CBC: N/A    R: Patient cleared and ready for behavioral bed placement: Yes  Pt placed on IP worklist? Yes    Pt has other MRN: [id number] - that may display further history. Pt charts will be merged.

## 2023-03-11 NOTE — ED PROVIDER NOTES
[facility] ([facility])    3/10/23       ED PROVIDER NOTE      History     Chief Complaint   Patient presents with     Addiction Problem     Looking for rehab using meth/etoh/marijuana     HPI  Lin Jane is a 38 year old male who presents to the ED seeking detox from methamphetamines, alcohol, and marijuana presents for methamphetamine abuse and invasive thoughts.  He states that he has been abusing meth, takes about 1 g over the course of a week by smoking or snorting. His last use was yesterday between 2 and 5 PM.  He rarely uses marijuana and alcohol and does not think he has used those in the last few days at least.  He states that part of the reason he came in today is because he feels like he is struggling with his mental health, primarily he reports hearing having invasive thoughts and paranoia about people around him being hurt or raped.  He denies any thoughts of hurting himself, suicidal thoughts, or hurting other people.  He is interested in getting assistance with getting into chemical dependency treatment.  He is currently unhoused and has been staying at shelter.  In addition he has not slept in 2 days and for the last couple days he has been out walking around in wet shoes and has pain in his feet.      Of note patient gave an incorrect name, his correct spelling of his name is Chu, last name Margarito, date of birth of 1985 and he has a previous MR number here in our system [id number]. When asked about why he gave an incorrect spelling of his name he reports feeling paranoid that people are going to use his records against him.  Per review of his chart he has a history of allergy to penicillin. Reports not taking his blood pressure medication for a few weeks.       Past Medical History  Past Medical History:   Diagnosis Date     Drug abuse (H)      History reviewed. No pertinent surgical history.  terbinafine (LAMISIL) 1 % external cream      No Known  "Allergies  Family History  History reviewed. No pertinent family history.  Social History   Social History     Tobacco Use     Smoking status: Every Day     Packs/day: 1.00     Types: Cigarettes   Substance Use Topics     Alcohol use: Yes     Comment: a few times a month     Drug use: Yes     Frequency: 3.0 times per week     Types: Marijuana, Methamphetamines      Past medical history, past surgical history, medications, allergies, family history, and social history were reviewed with the patient. No additional pertinent items.      A complete review of systems was performed with pertinent positives and negatives noted in the HPI, and all other systems negative.    Physical Exam   BP: (!) 147/86  Pulse: 74  Temp: 98.2  F (36.8  C)  Resp: 16  Height: 175.3 cm (5' 9\")  Weight: 117.9 kg (260 lb)  SpO2: 98 %     Physical Exam  Gen: Alert and cooperative.   HEENT:head atraumatic  CV:RRR without murmurs  PULM:Clear to auscultation bilaterally  Abd:soft, nontender, nondistended. Bowel sounds present and normal  UE:No traumatic injuries, skin normal  LE: feet are mildly red and wet, no open wounds or blisters.   Skin: no rashes or ecchymoses    ED Course, Procedures, & Data      Procedures                   No results found for any visits on 03/10/23.  Medications   terbinafine (lamISIL) 1 % cream ( Topical Not Given 3/11/23 0225)     Labs Ordered and Resulted from Time of ED Arrival to Time of ED Departure - No data to display  No orders to display            Assessment & Plan    37-year-old male presenting withmethamphetamine use. Having bothersome paranoid thoughts but no payam psychosis or SI/HI.  His last use was a little over 24 hours ago.  His vitals are stable. I will plan for him to have DEC assessment. We will monitor him here in the emergency department.    Urine drug screen was ordered.    On his physical exam he has signs of mild trench foot without any blistering.  His feet were cleaned and dried. He was " treated with topical antifungal and given clean socks.    3:07 AM  Pt's DEC assessment completed. They recommended monitoring him in the ED through the night for repeat DEC assessment in the AM.   Signed out to Dr. Lomeli overnight.     I have reviewed the nursing notes. I have reviewed the findings, diagnosis, plan and need for follow up with the patient.    New Prescriptions    TERBINAFINE (LAMISIL) 1 % EXTERNAL CREAM    Apply topically 2 times daily       Final diagnoses:   Methamphetamine abuse (H)   Trench foot, unspecified laterality, initial encounter     I, Manuel Shea, am serving as a trained medical scribe to document services personally performed by Katherine Munroe MD, based on the provider's statements to me.      I, Katherine Munroe MD, was physically present and have reviewed and verified the accuracy of this note documented by Manuel Shea.     [facility] EMERGENCY DEPARTMENT  3/10/2023    MD Ludwig Leiva Katrina Anne, MD  03/11/23 0318

## 2023-03-11 NOTE — PLAN OF CARE
Lin Jane  March 11, 2023  Plan of Care Hand-off Note     Patient Care Path: Inpatient Mental Health    Plan for Care:     Pt presents with paranoid thoughts and passive suicidal ideation in context of recent methamphetamine use.  Pt observed overnight in ED and reassessed today.  Pt continues to endorse passive suicidal ideation, paranoia that others are out to cause him harm and steal his identity. Pt is unable/unwilling to safety plan.  It is unclear if pt is experiencing internal stimuli but suspected.      Critical Safety Issues:  Recent hx of self injurious (head banging) behavior, active substance abuse, ADHD, off medications, homeless, without support system, worsening mood , paranoia and passive suicidal ideation.  Pt assessed at moderate risk.    Overview:  This patient is a child/adolescent: No    This patient has additional special visitor precautions: No    Legal Status: Voluntary    Contacts:   none ,  : Contact      Psychiatry Consult:  Adult Psychiatry Consult requested related to paranoia, mood. Psychiatry IP Consult Order Placed: No      Updated Attending Provider regarding plan of care.    Marcie Patterson

## 2023-03-11 NOTE — ED TRIAGE NOTES
"Pt ambulatory to ed a/ox4 currently homeless and living in shelters. Pt states wants to get clean and looking for rehab. Pt currently uses meth and marijuana and occasional alcohol. Pt states \"I just want a bed to sleep in its too cold to be outside now. I haven't slept in several days.\"   Otherwise pt calm and cooperative. NAD.      Triage Assessment     Row Name 03/10/23 2579       Triage Assessment (Adult)    Airway WDL WDL       Respiratory WDL    Respiratory WDL WDL       Skin Circulation/Temperature WDL    Skin Circulation/Temperature WDL WDL       Cardiac WDL    Cardiac WDL WDL       Peripheral/Neurovascular WDL    Peripheral Neurovascular WDL WDL       Cognitive/Neuro/Behavioral WDL    Cognitive/Neuro/Behavioral WDL WDL              "

## 2023-03-11 NOTE — CONSULTS
"Diagnostic Evaluation Consultation  Crisis Assessment    Patient was assessed: [facility]  Patient location: [place] [facility]   Was a release of information signed: No. Reason: pt does not have any providers for the [facility].  He is on observation status.       Referral Data and Chief Complaint  Lin Jane is a 38 year old, who uses he/him pronouns.  Per ED note, \"patient gave an incorrect name, his correct spelling of his name is Chu, last name Margarito, date of birth of 1985 and he has a previous MR number here in our system [id number]. When asked about why he gave an incorrect spelling of his name he reports feeling well paranoid that people are going to use his records against him.\"  He presents to the ED alone. Patient is referred to the ED by self. Patient is presenting to the ED for the following concerns: methamphetamine abuse, suicidal ideation, paranoia/feeling unsafe, and auditory hallucinations.      Informed Consent and Assessment Methods     Patient is his own guardian. Writer met with patient and explained the crisis assessment process, including applicable information disclosures and limits to confidentiality, assessed understanding of the process, and obtained consent to proceed with the assessment. Patient was observed to be able to participate in the assessment as evidenced by verbal consent . Assessment methods included conducting a formal interview with patient, review of medical records, collaboration with medical staff, and obtaining relevant collateral information from family and community providers when available..     Over the course of this crisis assessment provided reassurance, offered validation and engaged patient in problem solving and disposition planning. Patient's response to interventions was pt was polite and cooperative. He was somewhat guarded however answered all assessment questions. For some of the assessment he had on his peralta and medical mask, covering almost all of his face. " "     Summary of Patient Situation     Per chart, pt presented to the ED seeking detox and he has invasive thoughts. . His last use was yesterday between 2 and 5pm.   He states that part of the reason he came in today is because he feels like he is struggling with his mental health, primarily he reports hearing having invasive thoughts and paranoia about people around him being hurt or raped.  He is interested in getting assistance and getting into chemical dependency treatment.  He is currently homeless and has been staying at a shelter for 1.5-2 years.   In addition he has not slept in 2 days and for the last couple days he has been out walking around in wet shoes and has pain in his feet.  During the DEC assessment, pt stated that he thinks he needs to be at the hospital. He said \"I don't feel safe outside. I don't feel safe inside. I don't know if I'm losing my mind. I don't know if there is a whole lot of gas lighting. I'm trying to figure out the next best move for everybody.\"  Pt reports some \"crazy things going on spiritually, their rights, taken advantage of. My rights and privacy have been taken advantage of and being used in a wrongful way.  I need to get to the right treatment place to see if it's true.\" and  \"I don't know what is going on because I'm not God. I've been feeling very attacked spiritually by negative things. Not just using drugs but from being in shelters. I'm not okay with it. It needs to be fixed.\" Pt reports he sometimes bangs his head to \"make the stuff stop\". He is not sure when he last banged his head, however reports it was within the last 3 months.   Pt reports hearing voices \"in my mind and voices around me. I don't know if I'm losing my mind.\"  Pt reports it sounds like the voices are in the next room getting hurt or abused saying \"help me help me\" and \"no no, please get up.\"  He also reports command hallucinations sometimes such as \"go get up\", \"go left\", and \"go right\".  He " "denies that the voices tell him to hurt anyone. He said sometimes the voices tell him that others want to hurt him.  He said \"I don't know if it's my dad's involvement. I just want to get free and be successful.\"  He reports problems trusting people which has impacted his ability to establish a support system.  He reports hearing the voices for \"too long\".      Pt feels like when he is high on methamphetamine, he feels normal and \"the only way I can function is when I am high\".  Pt reports he usually lounges around during the day.  Pt reports he has thought about harming himself by staying outside and freezing to death.  He has thought that it would be better if he did this.  Pt denies a plan or intent.   Pt reports he did not get any sleep for the past two days \"I've been running around for two days.\"  Pt reports he usually gets a lot of sleep but has not been able to sleep in the last few days. Pt reports a good appetite.     To note, during the DEC assessment, an ED staff came in the room and then left when they realized the assessment was occurring.  Pt expressed paranoid thoughts regarding that staff, stating \"just like that someone comes and tries to steal!\"      Brief Psychosocial History    Pt has two children that reside with their mother. He is not sure where they are and he misses them. Pt reports that he believes in God. Pt has resided in a shelter 1-2 years on and off.  He reports that he broke up with his girlfriend and then started residing in a shelter 3-6 months later.  Pt identifies as . He denies having a support system. He reports low motivation doing tasks and states that he usually \"lounges around\" and uses drugs during the day.       Significant Clinical History     Pt has a history of anxiety and depression He reports a history of being hospitalized inpatient, however was unable/unwilling to provide details.  Pt reports a history of QUINCY tx at least 12 times.  The last time was over " "one year ago.  Pt also reported a history of being prescribed Adderal, reporting that he stopped taking this 6 months ago.      Collateral Information  No collateral was available at the time of the assessment. Pt denies having a support system.       Risk Assessment  [place] Suicide Severity Rating Scale Full Clinical Version: 3/11/23  Suicidal Ideation  1. Wish to be Dead (Lifetime): Yes  1. Wish to be Dead (Past 1 Month): Yes  2. Non-Specific Active Suicidal Thoughts (Lifetime): Yes  2. Non-Specific Active Suicidal Thoughts (Past 1 Month): Yes  3. Active Suicidal Ideation with any Methods (Not Plan) Without Intent to Act (Lifetime): Yes  3. Active Suicidal Ideation with any Methods (Not Plan) Without Intent to Act (Past 1 Month): Yes  Active Suicidal Ideation with any Methods (Not Plan) Description (Past 1 Month):  (Patient has considered going outside to freeze to death)  4. Active Suicidal Ideation with Some Intent to Act, Without Specific Plan (Lifetime): No  4. Active Suicidal Ideation with Some Intent to Act, Without Specific Plan (Past 1 Month): No  5. Active Suicidal Ideation with Specific Plan and Intent (Lifetime): No  5. Active Suicidal Ideation with Specific Plan and Intent (Past 1 Month): No  Intensity of Ideation  Most Severe Ideation Rating (Lifetime):  (\"It was nothing at all\")  Most Severe Ideation Rating (Past 1 Month): 3  Frequency (Lifetime):  (\"it was nothing at all\")  Frequency (Past 1 Month): Many times each day  Duration (Past 1 Month): 4-8 hours/most of day  Controllability (Past 1 Month): Can control thoughts with little difficulty  Deterrents (Past 1 Month): Deterrents probably stopped you  Reasons for Ideation (Past 1 Month):  (\"I don't know why. I'm trying to figure it out.\")  Suicidal Behavior  Actual Attempt (Lifetime): No  Has subject engaged in non-suicidal self-injurious behavior? (Lifetime): Yes  Has subject engaged in non-suicidal self-injurious behavior? (Past 3 Months): Yes " "(sometimes bangs head to \"make the stuff stop\")  Interrupted Attempts (Lifetime): No  Aborted or Self-Interrupted Attempt (Lifetime): No  Preparatory Acts or Behavior (Lifetime): No  C-SSRS Risk (Lifetime/Recent)  Calculated C-SSRS Risk Score (Lifetime/Recent): Moderate Risk       Validity of evaluation is impacted by presenting factors during interview. Pt is somewhat guarded.  He also has not slept for 2 days which could impact his motivation to fully answer all of the questions.    Environmental or Psychosocial Events: challenging interpersonal relationships, barriers to accessing healthcare, helplessness/hopelessness, unemployment/underemployment, unstable housing, homelessness, ongoing abuse of substances, neither working nor attending school and social isolation  Chronic Risk Factors: history of psychiatric hospitalization and history of Non-Suicidal Self Injury (NSSI)   Warning Signs: talking or writing about death, dying, or suicide, hopelessness, feeling trapped, like there is no way out, increasing substance use or abuse, anxiety, agitation, unable to sleep, sleeping all the time and no reason for living, no sense of purpose in life  Protective Factors: help seeking and cultural, spiritual , or Evangelical beliefs associated with meaning and value in life  Interpretation of Risk Scoring, Risk Mitigation Interventions and Safety Plan:  Pt presents to the ED and reports he wants detox for methamphetamine use. He has stressors that impact his mental health symptoms. He reports suicidal ideation and has considered freezing to death outside. He denies intent of suicide.  He has paranoia and reports no social support, which puts him at further risk.        Does the patient have thoughts of harming others? No     Is the patient engaging in sexually inappropriate behavior?  no        Current Substance Abuse     Is there recent substance abuse?  Methamphetamine abuse for 6 years.  Last use was yesterday \"earlier in " "the day\", estimates using \"a couple of hits\", \"about a quarter gram\".  He reports he uses one gram per week.  Pt denies alcohol use and denies using cannabis in the last few days.       Was a urine drug screen or blood alcohol level obtained: Labs were ordered.  At the time of the DEC assessment, the sample was not yet collected.        Mental Status Exam     Affect: Appropriate , sometimes difficult to assess as pt was almost fully covered with his peralta and mask during some of the assessment.   Appearance: Appropriate    Attention Span/Concentration: Attentive  Eye Contact: Variable   Fund of Knowledge: Appropriate    Language /Speech Content: Fluent   Language /Speech Volume: Normal    Language /Speech Rate/Productions: Normal , some guarded responses    Recent Memory: Intact   Remote Memory: Intact   Mood: Anxious and Depressed    Orientation to Person: Yes    Orientation to Place: Yes   Orientation to Time of Day: Yes    Orientation to Date: Yes    Situation (Do they understand why they are here?): Yes    Psychomotor Behavior: Normal    Thought Content: Hallucinations, Paranoia and Suicidal ideation  Thought Form: Goal Directed      History of commitment: \"I'm not too sure\"    Medication    Psychotropic medications: No Pt reports he normally takes adderal. Pt reports he stopped taking Adderal 6 months ago.    Medication changes made in the last two weeks: No       Current Care Team    Primary Care Provider: No  Psychiatrist: No  Therapist: No  : No     CTSS or ARMHS: No  ACT Team: No  Other: No      Diagnosis    311 (F32.9) Unspecified Depressive Disorder    Substance-Related & Addictive Disorders Stimulant Use Disorder:  ., Specify current severity:  Moderate  304.40 (F15.20) Moderate, Amphetamine type substance - by history       Clinical Summary and Substantiation of Recommendations    Pt presents to the ED and reports methamphetamine abuse with his most recent use yesterday. He is experiencing " paranoia, auditory hallucinations, and does not feel safe in the community. The auditory hallucinations are stressful for him. He has command hallucinations and denies that they tell him to harm himself or others. He is paranoid about others, thinking they are trying to harm him or steal from him. He reports a history of banging his head when he has more symptoms and is not sure when the last time was that he did this. He reports suicidal ideation and has considered a plan to freeze to death outside. He denies intent of suicide. He is homeless and does not have social support. He reports not sleeping for two days and walking around outside during that time.  He reports a history of QU[facility]CY tx at least 12 times. At the time of the assessment, the UTOX results were not available as the sample was not yet collected. It is likely that he is experiencing drug induced psychosis along with symptoms of depression.   Observation status is recommended at this time so that patient can have time to sleep and have a longer time to be sober from methamphetamine, to see if symptoms improve. He will be reassessed tomorrow. If pt's symptoms improved, it would be reasonable for him to discharge with a QUINCY assessment and psychiatry.     Disposition    Recommended disposition: Other: observation status       Reviewed case and recommendations with attending provider. Attending Name: Dr. Munroe       Attending concurs with disposition: Yes       Patient and/or validated legal guardian concurs with disposition: Yes       Final disposition: Other: observation status.     Assessment Details    Patient interview started at: 12:16am and completed at: 12:53am.     Total duration spent on the patient case in minutes: 1.25 hrs      CPT code(s) utilized: 44180 - Psychotherapy for Crisis - 60 (30-74*) min       CECILE Cavazos,  Psychotherapist  DEC - Triage & Transition Services  Callback: [phone]

## 2023-03-11 NOTE — CONSULTS
"[facility] Crisis Reassessment      Lin Jane was reassessed at the request of Dr Romero for the following reasons: paranoia and suicidal ideation after observation overnight. Pt was first seen on 3/10/23 by  Viviana Echols; see the initial assessment note for details.      Patient Presentation    Initial ED presentation details: Pt presented to ED seeking detox with complaints of invasive thoughts including paranoia that he and those around him are unsafe and at risk of being harmed.   Pt also endorsed suicidal ideation with thoughts of freezing to death.  Pt has been staying in shelter for past 1-2 years.  Pt reported last use of methamphetamine was between 2-5p on 3/10/23.   Current patient presentation: Pt continues to express paranoid thoughts and worries that the police or others are out to get him.   Pt spells his name incorrectly to  to protect his identity.   He repeatedly states he feels unsafe and needs help.   Feels his mood is worse when he is not using meth.  Pt denies current suicide plan but remarks \"why shouldn't I?\" .  Pt describes feeling \"psychotic\" , \"something is wrong\" and requests inpatient services to get back on medication.   Pt agrees he needs chemical dependency treatment as well.   Pt refuses to participate in safety planning due to feeling unsafe.    Changes observed since initial assessment: Pt denies specific plan for suicide.  Pt continues to express paranoia, invasive thoughts and feeling unsafe.  Suicide rating notes pt at moderate risk.        Risk of Harm    Stark Suicide Severity Rating Scale Full Clinical Version: 3/10/23  Suicidal Ideation  1. Wish to be Dead (Lifetime): Yes  1. Wish to be Dead (Past 1 Month): Yes  2. Non-Specific Active Suicidal Thoughts (Lifetime): Yes  2. Non-Specific Active Suicidal Thoughts (Past 1 Month): Yes  3. Active Suicidal Ideation with any Methods (Not Plan) Without Intent to Act (Lifetime): Yes  3. Active Suicidal Ideation with any Methods " "(Not Plan) Without Intent to Act (Past 1 Month): Yes  Active Suicidal Ideation with any Methods (Not Plan) Description (Past 1 Month):  (Patient has considered going outside to freeze to death)  4. Active Suicidal Ideation with Some Intent to Act, Without Specific Plan (Lifetime): No  4. Active Suicidal Ideation with Some Intent to Act, Without Specific Plan (Past 1 Month): No  5. Active Suicidal Ideation with Specific Plan and Intent (Lifetime): No  5. Active Suicidal Ideation with Specific Plan and Intent (Past 1 Month): No  Intensity of Ideation  Most Severe Ideation Rating (Lifetime):  (\"It was nothing at all\")  Most Severe Ideation Rating (Past 1 Month): 3  Frequency (Lifetime):  (\"it was nothing at all\")  Frequency (Past 1 Month): Many times each day  Duration (Past 1 Month): 4-8 hours/most of day  Controllability (Past 1 Month): Can control thoughts with little difficulty  Deterrents (Past 1 Month): Deterrents probably stopped you  Reasons for Ideation (Past 1 Month):  (\"I don't know why. I'm trying to figure it out.\")  Suicidal Behavior  Actual Attempt (Lifetime): No  Has subject engaged in non-suicidal self-injurious behavior? (Lifetime): Yes  Has subject engaged in non-suicidal self-injurious behavior? (Past 3 Months): Yes (sometimes bangs head to \"make the stuff stop\")  Interrupted Attempts (Lifetime): No  Aborted or Self-Interrupted Attempt (Lifetime): No  Preparatory Acts or Behavior (Lifetime): No  C-SSRS Risk (Lifetime/Recent)  Calculated C-SSRS Risk Score (Lifetime/Recent): Moderate Risk      Validity of evaluation is impacted by presenting factors during interview paranoia, substance use.   Comments regarding subjective versus objective responses to Morrow tool: pt partially answering questions, becoming agitated  Environmental or Psychosocial Events: helplessness/hopelessness, impulsivity/recklessness, unemployment/underemployment, unstable housing, homelessness and ongoing abuse of " substances  Chronic Risk Factors: history of psychiatric hospitalization and history of Non-Suicidal Self Injury (NSSI)   Warning Signs: seeking access to means to hurt or kill self, talking or writing about death, dying, or suicide, hopelessness, increasing substance use or abuse and no reason for living, no sense of purpose in life  Protective Factors: help seeking  Interpretation of Risk Scoring, Risk Mitigation Interventions and Safety Plan:  Pt presents to ED with complaint of paranoia, invasive thoughts and feeling unsafe. Risk factors include currently using methamphetamine, homeless and without supports.  Pt is not taking prescribed medication.  Pt has hx of ADHD, Depression and Anxiety.  Pt describes worsening mood when not using drugs.  Pt assessed to be at moderate safety risk.  Pt unable or unwilling to safety plan at this time.  Pt requesting voluntary inpatient hospitalization at this time.      Does the patient have thoughts of harming others? No    Mental Status Exam   Affect: Labile   Appearance: Appropriate    Attention Span/Concentration: Attentive?    Eye Contact: Variable   Fund of Knowledge: Appropriate    Language /Speech Content: Fluent   Language /Speech Volume: Loud and Normal    Language /Speech Rate/Productions: Pressured    Recent Memory: Intact   Remote Memory: Intact   Mood: Anxious and Irritable    Orientation to Person: Answer: spells name incorrectly    Orientation to Place: Yes   Orientation to Time of Day: Yes    Orientation to Date: Yes    Situation (Do they understand why they are here?): Yes    Psychomotor Behavior: Agitated    Thought Content: Paranoia   Thought Form: Tangential       Additional Collateral Information   No collateral available      Therapeutic Intervention  The following therapeutic methodologies were employed when working with the patient: Establishing rapport, Active listening, Apply solution-focused therapy to address current crisis, Identify additional  supports and alternative coping skills and Safety planning. Patient response to intervention: unable to safety plan.    Diagnosis:      311 (F32.9) Unspecified Depressive Disorder    Substance-Related & Addictive Disorders Stimulant Use Disorder:  ., Specify current severity:  Moderate  304.40 (F15.20) Moderate, Amphetamine type substance - by history     Clinical Substantiation of Recommendations  Pt presents to ED following methamphetamine use.  Pt requesting detox and is observed overnight in ED.  Pt continues to express paranoia and agitation.   Expresses feeling his identity has been compromised, others are talking about him and out to cause him harm though he is unsure why.  Pt denies current plan for suicide but endorses passive thoughts.  Pt reports worsening mood when not using and desire to stop using and restart medication.  Pt denies feeling safe to discharge and is unwilling or unable to participate in safety planning. -  Pt requesting voluntary hospitalization.   ER MD consulted and supports plan for inpatient at this time.      Plan:    Disposition  Recommended disposition: Inpatient Mental Health and Rule 25/QUINCY Assessment      Reviewed case and recommendations with attending provider. Attending Name: Dr Romero      Attending concurs with disposition: Yes      Patient and/or verified legal guardian concurs with disposition: Yes      Final disposition: Inpatient mental health  and Rule 25/QUINCY Assessment.         Assessment Details  Total duration spent on the patient case in minutes: 1.0 hrs     CPT code(s) utilized: 86795 - Psychotherapy for Crisis - 60 (30-74*) min       Marcie Patterson [facility]  Callback: [phone]

## 2023-03-11 NOTE — PLAN OF CARE
"Lin Jane  March 11, 2023  Plan of Care Hand-off Note     Patient Care Path: Observation    Plan for Care:     Pt presents to the ED and reports methamphetamine abuse with his most recent use yesterday. He is experiencing paranoia, auditory hallucinations, and does not feel safe in the community. The auditory hallucinations are stressful for him. He has command hallucinations and denies that they tell him to harm himself or others. He is paranoid about others, thinking they are trying to harm him or steal from him. He reports a history of banging his head when he has more symptoms and is not sure when the last time was that he did this. He reports suicidal ideation and has considered a plan to freeze to death outside. He denies intent of suicide. He is homeless and does not have social support. He reports not sleeping for two days and walking around outside during that time.  He reports a history of QUINCY tx at least 12 times.  It is likely that he is experiencing drug induced psychosis along with symptoms of depression.   Observation status is recommended at this time so that patient can have time to sleep and have a longer time to be sober from methamphetamine, to see if symptoms improve.  If pt's symptoms improve, it would be reasonable for him to discharge with a QUINCY assessment and psychiatry.       Critical Safety Issues: Pt has paranoia and he provided the incorrect spelling of his name and birth date to triage. During the DEC assessment, he thought an ED staff was coming in the room to steal from him. He has suicidal ideation and has considered a plan to freeze to death outside. He denies intent of suicide. He has a history of SIB by banging his head. He has not slept for two days and was wandering around the community during that time. He has auditory hallucinations that are stressful. He reports hearing voices of someone getting hurt/assaulted.  He reports command hallucinations telling him to \"go " "left\", \"go right\" and denies that they tell him to harm himself or others.  He abuses methamphetamine and his last reported use was yesterday.      Overview:  This patient is a child/adolescent: No    This patient has additional special visitor precautions: No    Legal Status: Voluntary    Contacts:   None     Psychiatry Consult:  Adult Psychiatry Consult requested related to mental health symptoms. Psychiatry IP Consult Order Placed: Yes    Updated RN and Attending Provider regarding plan of care.    CECILE Cavazos    "

## 2023-03-11 NOTE — ED NOTES
SIGN-OUT:  - Assumed care of this patient from Dr. Choi.  Patient has been using methamphetamine and has been having paranoid thoughts.  Patient is not expressing suicidal ideation or homicidal ideation and is not holdable.  Patient had an initial mental health assessment and they recommended repeat assessment after a period of observation to determine if this could be related to the methamphetamine abuse.  Patient will have a repeat assessment tomorrow morning.  - Pending at shift change: Needs repeat mental health assessment  - Tentative plan from original EM Physician: Repeat mental health assessment    UPDATES / REASSESSMENT:   Reassessment:   --- Patient is still awaiting mental health assessment.  Was signed out to Dr. Connors who will follow-up on DEC assessment.      MD Armani Orozco, Ronn Perez MD  03/11/23 0832

## 2023-03-11 NOTE — ED PROVIDER NOTES
Patient initially evaluated by Dr. Munroe for methamphetamine use and paranoid ideations.  Initially seen by DEC last evening patient was reassessed this morning still expressing paranoid ideations and feelings of unsafe.  Patient is requesting mental health admission.  DEC  did evaluate patient at this point we will plan to order a bed at this point patient is voluntary.  I did order U tox also.    Patient covid + with minimal sx. Patient to be admitted [facility] med bed and discussed with med triage.     Raffy Romero MD  03/11/23 1210       Raffy Romero MD  03/11/23 1696

## 2023-03-11 NOTE — H&P
[facility]    History and Physical - Hospitalist Service, GOLD TEAM        Date of Admission:  3/10/2023    Assessment & Plan      Lin Jane is a 38 year old male admitted on 3/10/2023. He has a past medical history of anxiety, depression, class II obesity, HTN, T2DM, gout, polysubstance use disorder, who was admitted from [place] ED on 3/11/23 after presenting for evaluation of polysubstance detox (as below). Patient also found to COVID +, mildly symptomatic, admitted for further monitoring and management.     Per ED note, this chart lists pt's name incorrectly. Pt has another chart with MRN [id number], whereby the correct spelling of his name is Chu Pimentel with a  1985. When questioned about rationale for giving incorrect name and , pt noted that he was feeling paranoid about others using his records against him.     Confirmed COVID-19 infection       Symptom Onset 3/9/23   Date of 1st Positive Test 3/11/2023   Vaccination Status Partially Vaccinated       - COVID-19 special precautions, continuous pulse-ox  - Oxygen: continue current support with O2 Device: None (Room air) at  ; titrate to keep SpO2 between 90-96%  - Labs: Labs notable for mild hypokalemia. DDimer pending.   - Imaging: no additional imaging needed at this time  - Breathing treatments: no inhalers needed; avoid nebulizers in favor of MDIs. Flonase ordered.   - IV fluids: not indicated at this time  - Antibiotics: not indicated   - COVID-Focused Medications: No COVID-specific therapies are appropriate at this time.  - DVT Prophylaxis:         - At high risk of thrombotic complications due to COVID-19 (DDimer = N/A )         - PROPHYLACTIC dosing: lovenox 40mg daily. DDimer pending, so initiating ppx dosing for the time being. No s/sx of DVT/PE, low suspicion of this. Follow Ddimer.    Polysubstance Use Disorder  Pt admitted for seeking detox from methamphetamines, alcohol, tobacco,  "and marijuana, expressed interest in getting into chemical dependency. Has been using ~1g meth over the past week by smoking and snorting, last marijuana and alcohol use he estimates was more than several days ago. Had previously also been abusing opiates, and was then placed on Suboxone, but appears that last refill of this for x7 days was on 2/21/23 by Dr. Chrsitianne Pelletier at [facility] per PDMP (in pt's other chart). No nasal sores or ulcerations/bleeding/drainage w/ recent snorting of meth.  - Psychiatry consulted to discuss drug detox + paranoid (as below)  - Pending Utox sample collection    Paranoia  Hallucinations (Auditory and Visual)  Insomnia   MDD  CULLEN  Hx of ADHD  Pt admitted in ED to invasive, paranoid thoughts of people around him being hurt and/or raped. He has also been experiencing auditory hallucinations which are counting numbers, reciting letter, and giving him benign commands such as \"look left, go right, walk forward\". No threatening voices. Denies suicidal or homicidal ideation, no thoughts of harming himself or others. Had one episode of visual hallucinations last week whereby he saw \"eyes in a vent on the ceiling\". Per other chart, has previously been on Celexa, Lexapro, Strattera, Adderall, and Zyprexa. Clinical vignette raises c/f evolving schizophreniform vs schizophrenia vs other psychotic disorder. ?schizoaffective component, but denies mood changes.  - Psychiatry consulted, appreciate assistance   - Will schedule Melatonin 5mg at bedtime for now    Mild Trench Foot  In ED, pt had noted walking around in same socks and shoes for two days that were wet, had been reporting some bilateral foot pain. Feet were dried and he was treated with a topical antifungal, given clean socks. On exam, mild erythema to forefeet, but no e/o frostbite or necrotic-appearing tissue.  - Continue to monitor    HTN  In the past, per his other chart, has been on Amlodipine 10mg daily and Lisinopril 30mg daily. However, " "has not been taking for the past few weeks has reportedly not been taking. BP this evening is WNL.  - Given WNL BP, pt opting to not restart antihypertensives this evening, stopped taking sometime last year as he felt doctors were trying to kill him  - If needing these, he did seem open to eventually starting them when I explained the importance of managing BP, but unsure how adherent he will be given his hx    Hypokalemia  K 3.3 on admission, replaced w/ 40mEq KCl.  - RN replacement protocol initiated     Type 2 Diabetes Mellitus, non-insulin-dependent, improving  Per other chart, A1c in 05/2022 was 6.6%, A1c ultimately came down to 5.9% 01/25/23, thus categorizing pt as technically being prediabetic. He notes that he took Metformin for 2 days, then stopped as he didn't feel like taking it. Reportedly managed his diabetes by \"walking around and not eating much\". BG here 156, post-prandial.  - BG monitoring BID for now  - Consider escalating BG mgmt w/ low sliding scale insulin if needed  - Follow-up with PCP for ongoing diabetes mgmt    Hx of Gout  Typically presents in L great toe, occasionally R ankle. No issues at the moment.  - Monitor    Psychosocial Stressors  Homelessness  Pt homeless, and in the context of substance use as above, as well as struggling with mental health. Currently resides at a shelter. Pt hopes to someday hold a stable job and live healthily again.  - SW assisting with placement and resources for stable housing  - Patient strongly wishes to \"get [his] life in order\"   - Requests a PCP referral   - Wants an OP Psychiatrist   - Wants a        Diet: Regular Diet Adult    DVT Prophylaxis: Enoxaparin (Lovenox) SQ  Moy Catheter: Not present  Lines: None     Cardiac Monitoring: None  Code Status: Full Code    Clinically Significant Risk Factors Present on Admission        # Hypokalemia: Lowest K = 3.3 mmol/L in last 2 days, will replace as needed                # Obesity: Estimated " "body mass index is 38.4 kg/m  as calculated from the following:    Height as of this encounter: 1.753 m (5' 9\").    Weight as of this encounter: 117.9 kg (260 lb).           Disposition Plan      Expected Discharge Date: 03/13/2023                The patient's care was discussed with the Attending Physician, Dr. Anne Pastor, Bedside Nurse and Patient.    Sampson Hillman PA-C  Hospitalist Service, [facility]  Securely message with [employer] (more info)  Text page via [facility] Paging/Directory   See signed in provider for up to date coverage information    ______________________________________________________________________    Chief Complaint   \"I feel weird but I'm glad I'm here\"    History is obtained from the patient    History of Present Illness   Lin Jane is a 38 year old male who is seen in his room this evening. Patient notes that he has resided at [facility] for the past 1-2 years. However, two days ago, was caught smoking meth whereby staff reportedly kicked him out temporarily, requiring him to leave for 10 days. With nowhere to go, he was wandering the streets for the past two nights with no shoes, and socks that did not have soles in them. Has not slept or eaten/drank much in the past two days.     For the past two days, since leaving the shelter, notes that he has been hearing voices that have been counting numbers, reciting letters, and giving him commands such as \"go left, look up, walk forward\". He had one episode of a visual hallucination last week whereby he saw white eyes in a vent on the ceiling in a dark room. None since then, however. He also admits to believing that crowds are out to watch him, and that people are being watched on cameras, including himself, and that \"our identity\" has been compromised, especially with \"the [place], it listens, it knows what's going on, and the TVs and cameras know " "exactly what you're doing. They want to steal our identities\". He does not specify who \"they\" are. Denies suicidal ideation, homicidal ideation, thoughts of self-injurious behavior, harming others, or violent thoughts.    Reports onset of COVID sx two days ago. Has had diffuse body aches, a runny nose, nasal congestion. Otherwise denies chest pain, shortness of breath, abdominal pain, nausea, vomiting, urinary or bowel changes, leg pains or swelling.    As far as walking outside the past two days without shoes, does report bilateral foot pain and redness. No black-appearing toes or skin. Does have some numbness to the tip of the R great toe.    Past Medical History    Past Medical History:   Diagnosis Date     Anxiety      Depression      Drug abuse (H)      HTN (hypertension)      Obesity        Past Surgical History   History reviewed. No pertinent surgical history.    Prior to Admission Medications   None        Review of Systems    The 10 point Review of Systems is negative other than noted in the HPI or here.     Social History   I have reviewed this patient's social history and updated it with pertinent information if needed.  Social History     Tobacco Use     Smoking status: Every Day     Packs/day: 1.00     Types: Cigarettes   Substance Use Topics     Alcohol use: Yes     Comment: a few times a month     Drug use: Yes     Frequency: 3.0 times per week     Types: Marijuana, Methamphetamines       Allergies   Allergies   Allergen Reactions     Penicillins         Physical Exam   Vital Signs: Temp: 98.2  F (36.8  C) Temp src: Oral BP: 135/58 Pulse: 77   Resp: 18 SpO2: 100 % O2 Device: None (Room air)    Weight: 260 lbs 0 oz    Constitutional: awake, alert, cooperative, no apparent distress, and appears stated age  Eyes: lids and lashes normal, sclera clear and conjunctiva normal  ENT: normocepalic, without obvious abnormality  Respiratory: No increased work of breathing, not hypoxic on RA, good air exchange, " clear to auscultation bilaterally, no crackles or wheezing  Cardiovascular: regular rate and rhythm, normal S1 and S2, no S3, no S4 and no murmur noted, no gallops and no rubs.  GI: normal bowel sounds, soft, non-distended and non-tender  Skin: no bruising or bleeding, no rashes and no lesions on visualized skin. Does have mild erythema to bilateral forefeet extending distally into all digits. However, no open wounds on feet, aside from a few small well-healing abrasions. No necrosis at either foot.  Musculoskeletal: no lower extremity pitting edema present. No deformities. No joint swelling or joint warmth in feet or ankles.  Neurologic: Moving all extremities equally and spontaneously. No obvious focal neuro deficits. Loss of sensation to light touch at distal R great toe, and decreased sensation to light touch at L great toe. Intact to light touch sensation in all dermatomes at remainder of feet. No loss of motor capabilities at feet or toes.  Neuropsychiatric: General: normal, calm and normal eye contact  Level of consciousness: alert / normal  Affect: normal and pleasant    Medical Decision Making       90 MINUTES SPENT BY ME on the date of service doing chart review, history, exam, documentation & further activities per the note.      Data     I have personally reviewed the following data over the past 24 hrs:    7.5  \   14.1   / 272     142 105 9.8 /  156 (H)   3.3 (L) 25 0.73 \       ALT: 13 AST: 17 AP: 62 TBILI: 0.4   ALB: 3.9 TOT PROTEIN: 6.6 LIPASE: N/A       Procal: N/A CRP: 3.70 Lactic Acid: N/A       INR:  N/A PTT:  N/A   D-dimer:  0.31 Fibrinogen:  N/A       Imaging results reviewed over the past 24 hrs:   No results found for this or any previous visit (from the past 24 hour(s)).  Recent Labs   Lab 03/11/23  1938   WBC 7.5   HGB 14.1   MCV 88         POTASSIUM 3.3*   CHLORIDE 105   CO2 25   BUN 9.8   CR 0.73   ANIONGAP 12   TRICIA 8.7   *   ALBUMIN 3.9   PROTTOTAL 6.6   BILITOTAL  0.4   ALKPHOS 62   ALT 13   AST 17

## 2023-03-12 LAB
ALBUMIN SERPL BCG-MCNC: 3.7 G/DL (ref 3.5–5.2)
ALP SERPL-CCNC: 62 U/L (ref 40–129)
ALT SERPL W P-5'-P-CCNC: 15 U/L (ref 10–50)
AMPHETAMINES UR QL SCN: ABNORMAL
ANION GAP SERPL CALCULATED.3IONS-SCNC: 7 MMOL/L (ref 7–15)
AST SERPL W P-5'-P-CCNC: 10 U/L (ref 10–50)
BARBITURATES UR QL SCN: ABNORMAL
BASOPHILS # BLD AUTO: 0 10E3/UL (ref 0–0.2)
BASOPHILS NFR BLD AUTO: 0 %
BENZODIAZ UR QL SCN: ABNORMAL
BILIRUB SERPL-MCNC: 0.3 MG/DL
BUN SERPL-MCNC: 9.5 MG/DL (ref 6–20)
BZE UR QL SCN: ABNORMAL
CALCIUM SERPL-MCNC: 8.7 MG/DL (ref 8.6–10)
CANNABINOIDS UR QL SCN: ABNORMAL
CHLORIDE SERPL-SCNC: 102 MMOL/L (ref 98–107)
CREAT SERPL-MCNC: 0.72 MG/DL (ref 0.67–1.17)
DEPRECATED HCO3 PLAS-SCNC: 31 MMOL/L (ref 22–29)
EOSINOPHIL # BLD AUTO: 0.2 10E3/UL (ref 0–0.7)
EOSINOPHIL NFR BLD AUTO: 3 %
ERYTHROCYTE [DISTWIDTH] IN BLOOD BY AUTOMATED COUNT: 14.5 % (ref 10–15)
GFR SERPL CREATININE-BSD FRML MDRD: >90 ML/MIN/1.73M2
GLUCOSE BLDC GLUCOMTR-MCNC: 114 MG/DL (ref 70–99)
GLUCOSE BLDC GLUCOMTR-MCNC: 144 MG/DL (ref 70–99)
GLUCOSE SERPL-MCNC: 123 MG/DL (ref 70–99)
HBA1C MFR BLD: 5.7 %
HCT VFR BLD AUTO: 41.5 % (ref 40–53)
HGB BLD-MCNC: 13.4 G/DL (ref 13.3–17.7)
IMM GRANULOCYTES # BLD: 0 10E3/UL
IMM GRANULOCYTES NFR BLD: 0 %
LYMPHOCYTES # BLD AUTO: 2.8 10E3/UL (ref 0.8–5.3)
LYMPHOCYTES NFR BLD AUTO: 37 %
MCH RBC QN AUTO: 28.5 PG (ref 26.5–33)
MCHC RBC AUTO-ENTMCNC: 32.3 G/DL (ref 31.5–36.5)
MCV RBC AUTO: 88 FL (ref 78–100)
MONOCYTES # BLD AUTO: 0.8 10E3/UL (ref 0–1.3)
MONOCYTES NFR BLD AUTO: 11 %
NEUTROPHILS # BLD AUTO: 3.8 10E3/UL (ref 1.6–8.3)
NEUTROPHILS NFR BLD AUTO: 49 %
NRBC # BLD AUTO: 0 10E3/UL
NRBC BLD AUTO-RTO: 0 /100
OPIATES UR QL SCN: ABNORMAL
PLATELET # BLD AUTO: 252 10E3/UL (ref 150–450)
POTASSIUM SERPL-SCNC: 3.6 MMOL/L (ref 3.4–5.3)
PROT SERPL-MCNC: 6.3 G/DL (ref 6.4–8.3)
RBC # BLD AUTO: 4.71 10E6/UL (ref 4.4–5.9)
SODIUM SERPL-SCNC: 140 MMOL/L (ref 136–145)
WBC # BLD AUTO: 7.7 10E3/UL (ref 4–11)

## 2023-03-12 PROCEDURE — 96372 THER/PROPH/DIAG INJ SC/IM: CPT

## 2023-03-12 PROCEDURE — 250N000011 HC RX IP 250 OP 636

## 2023-03-12 PROCEDURE — 80053 COMPREHEN METABOLIC PANEL: CPT

## 2023-03-12 PROCEDURE — 83036 HEMOGLOBIN GLYCOSYLATED A1C: CPT | Performed by: INTERNAL MEDICINE

## 2023-03-12 PROCEDURE — 82962 GLUCOSE BLOOD TEST: CPT

## 2023-03-12 PROCEDURE — G0378 HOSPITAL OBSERVATION PER HR: HCPCS

## 2023-03-12 PROCEDURE — 250N000013 HC RX MED GY IP 250 OP 250 PS 637: Performed by: INTERNAL MEDICINE

## 2023-03-12 PROCEDURE — 80307 DRUG TEST PRSMV CHEM ANLYZR: CPT | Performed by: FAMILY MEDICINE

## 2023-03-12 PROCEDURE — 99232 SBSQ HOSP IP/OBS MODERATE 35: CPT | Performed by: INTERNAL MEDICINE

## 2023-03-12 PROCEDURE — 99207 PR NO BILLABLE SERVICE THIS VISIT: CPT | Performed by: PSYCHIATRY & NEUROLOGY

## 2023-03-12 PROCEDURE — 250N000013 HC RX MED GY IP 250 OP 250 PS 637: Performed by: EMERGENCY MEDICINE

## 2023-03-12 PROCEDURE — 250N000013 HC RX MED GY IP 250 OP 250 PS 637

## 2023-03-12 PROCEDURE — 85025 COMPLETE CBC W/AUTO DIFF WBC: CPT

## 2023-03-12 PROCEDURE — 36415 COLL VENOUS BLD VENIPUNCTURE: CPT

## 2023-03-12 PROCEDURE — 36415 COLL VENOUS BLD VENIPUNCTURE: CPT | Performed by: INTERNAL MEDICINE

## 2023-03-12 RX ORDER — RISPERIDONE 2 MG/1
2 TABLET ORAL AT BEDTIME
Status: DISCONTINUED | OUTPATIENT
Start: 2023-03-12 | End: 2023-03-15

## 2023-03-12 RX ORDER — NICOTINE POLACRILEX 4 MG
15-30 LOZENGE BUCCAL
Status: DISCONTINUED | OUTPATIENT
Start: 2023-03-12 | End: 2023-03-22 | Stop reason: HOSPADM

## 2023-03-12 RX ORDER — AMLODIPINE BESYLATE 10 MG/1
10 TABLET ORAL DAILY
Status: DISCONTINUED | OUTPATIENT
Start: 2023-03-12 | End: 2023-03-22 | Stop reason: HOSPADM

## 2023-03-12 RX ORDER — DEXTROSE MONOHYDRATE 25 G/50ML
25-50 INJECTION, SOLUTION INTRAVENOUS
Status: DISCONTINUED | OUTPATIENT
Start: 2023-03-12 | End: 2023-03-22 | Stop reason: HOSPADM

## 2023-03-12 RX ORDER — RISPERIDONE 1 MG/1
1 TABLET ORAL DAILY
Status: DISCONTINUED | OUTPATIENT
Start: 2023-03-12 | End: 2023-03-15

## 2023-03-12 RX ORDER — LISINOPRIL 10 MG/1
30 TABLET ORAL DAILY
Status: DISCONTINUED | OUTPATIENT
Start: 2023-03-12 | End: 2023-03-14

## 2023-03-12 RX ADMIN — Medication 5 MG: at 22:01

## 2023-03-12 RX ADMIN — FLUTICASONE PROPIONATE 2 SPRAY: 50 SPRAY, METERED NASAL at 09:51

## 2023-03-12 RX ADMIN — ENOXAPARIN SODIUM 40 MG: 40 INJECTION SUBCUTANEOUS at 09:51

## 2023-03-12 RX ADMIN — AMLODIPINE BESYLATE 10 MG: 10 TABLET ORAL at 17:47

## 2023-03-12 RX ADMIN — TERBINAFINE HYDROCHLORIDE: 1 CREAM TOPICAL at 22:02

## 2023-03-12 RX ADMIN — RISPERIDONE 2 MG: 2 TABLET ORAL at 22:01

## 2023-03-12 RX ADMIN — LISINOPRIL 30 MG: 10 TABLET ORAL at 17:47

## 2023-03-12 RX ADMIN — RISPERIDONE 1 MG: 1 TABLET ORAL at 17:47

## 2023-03-12 ASSESSMENT — ACTIVITIES OF DAILY LIVING (ADL)
ADLS_ACUITY_SCORE: 33
ADLS_ACUITY_SCORE: 31
ADLS_ACUITY_SCORE: 33
ADLS_ACUITY_SCORE: 31
ADLS_ACUITY_SCORE: 33
ADLS_ACUITY_SCORE: 33
ADLS_ACUITY_SCORE: 31
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
## 2023-03-12 NOTE — CONSULTS
Patient seen and chart reviewed. Formal consult dictated.(initial) telephone 15mns    Jaden Malcolm MD

## 2023-03-12 NOTE — PLAN OF CARE
"VS:     BP (!) 148/82 (BP Location: Left arm)   Pulse 84   Temp 98.9  F (37.2  C) (Oral)   Resp 18   Ht 1.753 m (5' 9\")   Wt 117.9 kg (260 lb)   SpO2 98%   BMI 38.40 kg/m      Pt A/O X 4. Afebrile. VSS. Lungs-clear.Denies nausea, shortness of breath, and chest pain.     Output:     Continent of bowel and bladder     Activity:       Independent in room    Covid pos.      Skin: Scabs on knuckles     Pain:     denies     CMS:       CMS and Neuro's are intact. Denies numbness and tingling in all extremities.      Dressing:     none     Diet:     Reg diet   LDA:     none     Plan:       Pt is able to make needs known and the call light is within the pt's reach. Continue to monitor.       Additional Info:     Pt having auditory hallucinations- Psych following               "

## 2023-03-12 NOTE — PROGRESS NOTES
[facility]    Medicine Progress Note - Hospitalist Service, GOLD TEAM 18    Date of Admission:  3/10/2023    Assessment & Plan   Lin Jane is a 39 yo male admitted on 3/10/2023.  He has a past medical history of anxiety, depression, class II obesity, HTN, T2DM, gout,  polysubstance use disorder who was admitted from [place] ED on 3/11/23 after presenting there for polysubstance detox.  He tested positive for COVID-19 during routine screening in the ED.  Admitted to medical floor because he tested + for COVID-19.     Per ED note, this encounter lists pt's name incorrectly.  Pt has another chart with MRN [id number], whereby the correct spelling of his name is Chu Pimentel with a  1985.  He provided wrong name and  because he is paranoid about others using his records again him      Confirmed COVID-19 infection    ---   Partially vaccinated   ---   He developed diffuse body aches, runny nose and nasal congestion on 3/9  ---   SARS CoV-2 PCR + on 3/11/23  ---   On RA  ---   No indications for COVID directed therapy  ---   Enoxaparin 40 mg sq daily for DVT prophylax  ---   Full barrier isolation until 3/21     Polysubstance Use Disorder  ---   He uses methamphetamines, alcohol, tobacco and marijuana  ---   He expressed interest in getting into chemical dependency.   ---   Has been using ~1g meth over the past week by smoking and snorting  ---   Last marijuana and alcohol use he estimates was more than several days ago. ---   Had previously abused opiates, and was then placed on Suboxone, but appears that last refill of this for 7 days was on 23 [place]. Christianne [place] [facility] [place]'s other chart).   ---   U tox screen is positive for amphetamine only  ---   No nasal sores or ulcerations/bleeding/drainage w/ recent snorting of meth.  ---   Psychiatry consulted to discuss drug detox + paranoid (as below)     Paranoia  Hallucinations (Auditory and  "Visual)  Insomnia   MDD  CULLEN  Hx of ADHD  ---   Pt has been having paranoid thoughts of people around him being hurt and/or raped.   ---   He has also been experiencing auditory hallucinations which are counting numbers, reciting letter, and giving him benign commands such as \"look left, go right, walk forward\". No threatening voices.   ---   Denied suicidal or homicidal ideation, no thoughts of harming himself or others.   ---   Had one episode of visual hallucinations last week whereby he saw \"eyes in a vent on the ceiling\".   ---   Per other chart, has previously been on Celexa, Lexapro, Strattera, Adderall, and Zyprexa.   ---   Clinical vignette raises c/f evolving schizophreniform vs schizophrenia vs other psychotic disorder. ?schizoaffective component, but denies mood changes.  ---   Seen by Psychiatry consult today and assessment was psychosis not otherwise specified  ---   Will treat patient with risperidone 1 mg now and 2 mg po qhs  ---   Continue Melatonin 5mg at bedtime      Mild Trench Foot  ---   In ED, pt had noted walking around in same socks and shoes for two days that were wet, had been reporting some bilateral foot pain.   ---   Feet were dried and he was treated with a topical antifungal, given clean socks. On exam, mild erythema to forefeet, but no e/o frostbite or necrotic-appearing tissue.  ---    Continue to monitor     HTN  ---   Previously on Amlodipine 10 mg daily and Lisinopril 30 mg daily.   ---   However, he has not been taking them for the past few weeks  ---   Restart both meds       Hypokalemia  ---   K 3.3 on admission  ---   Replace per protocol     T2DM  ---   Was on Metformin in the  Past, took for 2 days, then stopped as he didn't feel like taking it.   ---   His diabetes is managed with diet  ---   Check Hgba1c, was 6.6% in 05/2022 was 6.6%  ---   Fasting glucose was at measure this a.m.  ---   Start NovoLog medium intensity sliding scale insulin     Hx of Gout  ---   Typically " "presents in L great toe, occasionally R ankle.   ---   No issues at the moment.  ---   Monitor     Psychosocial Stressors  Homelessness  ---   Pt homeless, and in the context of substance use as above, as well as struggling with mental health.   ---   Currently resides at a shelter.   ---   Pt hopes to someday hold a stable job and live healthily again.  ---   SW consult for placement   ---   Patient strongly wishes to \"get [his] life in order\"  ---   Requests a PCP referral  ---   Wants an OP Psychiatrist  ---   Wants a              Diet: Regular Diet Adult    DVT Prophylaxis: Enoxaparin (Lovenox) SQ  Moy Catheter: Not present  Lines: None     Cardiac Monitoring: None  Code Status: Full Code  Disposition Plan    TBD          Herve Olea MD  Hospitalist Service, GOLD TEAM 18  [facility]  Securely message with [employer] (more info)  Text page via [employer] Paging/Directory   See signed in provider for up to date coverage information  ______________________________________________________________________    Interval History   No new complaints.  He endorses auditory and visual hallucination  He feels very anxious  Has a bedside sitter  No cardiopulmonary symptoms    Physical Exam   Vital Signs: Temp: 98.6  F (37  C) Temp src: Oral BP: (!) 142/78 Pulse: 66   Resp: 18 SpO2: 98 % O2 Device: None (Room air)    Weight: 260 lbs 0 oz  General: aao x 3, NAD.  HEENT:  NC/AT, PERRL, EOMI, neck supple, no thyromegaly, op clear, mmm.  CVS:  NL s 1 and s2, no m/r/g.  Lungs:  CTA B/L.   Abd:  Soft, + bs, NT, no rebound or gaurding, no fluid shift.  Ext:  No c/c.  Lymph:  No edema.  Neuro:  Nonfocal.  Musculoskeletal: No calf tenderness to palpation.    Skin:  No rash.  Psychiatry:  Mood and affect appropriate.        Data     I have personally reviewed the following data over the past 24 hrs:    7.7  \   13.4   / 252     140 102 9.5 /  144 (H)   3.6 31 (H) 0.72 \       ALT: 15 " AST: 10 AP: 62 TBILI: 0.3   ALB: 3.7 TOT PROTEIN: 6.3 (L) LIPASE: N/A       Procal: N/A CRP: 3.70 Lactic Acid: N/A       INR:  N/A PTT:  N/A   D-dimer:  0.31 Fibrinogen:  N/A       Imaging results reviewed over the past 24 hrs:   No results found for this or any previous visit (from the past 24 hour(s)).

## 2023-03-12 NOTE — PLAN OF CARE
"Goal Outcome Evaluation:                        VS: Temp: 98  F (36.7  C) Temp src: Oral BP: 108/74 Pulse: 83   Resp: 18 SpO2: 98 % O2 Device: None (Room air)      O2: Sats >92% on RA  C/o colds- flonase ordered.  Denies SOB and chest pain   Output: Voids without difficulty.    Last BM: 3/11 and passing gas   Activity: Up ad corin, steady feet    Skin: Decline full assessment.   Scabs noted on his knuckles.   Pain: C/o generalized pain,decline Tylenol this shift   Neuro: Alert and oriented x4.  Denies numbness and tingling.   Dressing: None   Diet: Regular   LDA: None    Equipment: Personal belongings.   Plan: Awaiting psych to see pt    Additional Info: Continues to endorse auditory hallucination, telling pt to hurt himself by \"freezing to death\".     Sitter at bedside for safety.     K+ replaced at midnight. Refused recheck K+ at 0345. Reschedule together with AM labs.     Per ED note, this chart lists pt's name incorrectly. Pt has another chart with MRN [id number], whereby the correct spelling of his name is hCu Pimentel with a  1985.                    "

## 2023-03-12 NOTE — UTILIZATION REVIEW
Concurrent stay review; Secondary Review Determination     [facility]          Under the authority of the Utilization Management Committee, the utilization review process indicated a secondary review on the above patient.  The review outcome is based on review of the medical records, discussions with staff, and applying clinical experience noted on the date of the review.          (x) Observation Status Appropriate - Concurrent stay review    RATIONALE FOR DETERMINATION       37 yo male admitted on 3/10/2023.  He has a past medical history of anxiety, depression, class II obesity, HTN, T2DM, gout,  polysubstance use disorder who was admitted from [place] ED on 3/11/23 after presenting there for polysubstance detox.  He tested positive for COVID-19 during routine screening in the ED.  Admitted to medical floor because he tested + for COVID-19.  Patient has diffuse body aches secondary to COVID infection, but no indication for COVID directed therapy.  Patient had psychiatry consult, patient was diagnosed with psychosis primary or secondary to drug abuse and Risperdal p.o. was recommended.  The patient will be monitored for Risperdal treatment.   will evaluate the patient for placement.    Patient is clinically improving and there is no clear indication to change patient's status to inpatient. The severity of illness, intensity of service provided, expected LOS and risk for adverse outcome make the care appropriate for observation.      This document was produced using voice recognition software       The information on this document is developed by the utilization review team in order for the business office to ensure compliance.  This only denotes the appropriateness of proper admission status and does not reflect the quality of care rendered.         The definitions of Inpatient Status and Observation Status used in making the determination above are those provided in the CMS  Coverage Manual, Chapter 1 and Chapter 6, section 70.4.      Sincerely,     JOHN MONTERROSO MD   Utilization Review  Physician Advisor  [facility]

## 2023-03-13 LAB
GLUCOSE BLDC GLUCOMTR-MCNC: 112 MG/DL (ref 70–99)
GLUCOSE BLDC GLUCOMTR-MCNC: 114 MG/DL (ref 70–99)
GLUCOSE BLDC GLUCOMTR-MCNC: 117 MG/DL (ref 70–99)
GLUCOSE BLDC GLUCOMTR-MCNC: 157 MG/DL (ref 70–99)
POTASSIUM SERPL-SCNC: 3.9 MMOL/L (ref 3.4–5.3)

## 2023-03-13 PROCEDURE — 84132 ASSAY OF SERUM POTASSIUM: CPT | Performed by: INTERNAL MEDICINE

## 2023-03-13 PROCEDURE — 250N000013 HC RX MED GY IP 250 OP 250 PS 637

## 2023-03-13 PROCEDURE — 36415 COLL VENOUS BLD VENIPUNCTURE: CPT | Performed by: INTERNAL MEDICINE

## 2023-03-13 PROCEDURE — 99232 SBSQ HOSP IP/OBS MODERATE 35: CPT | Performed by: INTERNAL MEDICINE

## 2023-03-13 PROCEDURE — 96372 THER/PROPH/DIAG INJ SC/IM: CPT

## 2023-03-13 PROCEDURE — 82962 GLUCOSE BLOOD TEST: CPT

## 2023-03-13 PROCEDURE — G0378 HOSPITAL OBSERVATION PER HR: HCPCS

## 2023-03-13 PROCEDURE — 250N000013 HC RX MED GY IP 250 OP 250 PS 637: Performed by: INTERNAL MEDICINE

## 2023-03-13 PROCEDURE — 250N000011 HC RX IP 250 OP 636

## 2023-03-13 RX ADMIN — AMLODIPINE BESYLATE 10 MG: 10 TABLET ORAL at 08:47

## 2023-03-13 RX ADMIN — Medication 5 MG: at 22:15

## 2023-03-13 RX ADMIN — LISINOPRIL 30 MG: 10 TABLET ORAL at 08:47

## 2023-03-13 RX ADMIN — ENOXAPARIN SODIUM 40 MG: 40 INJECTION SUBCUTANEOUS at 08:47

## 2023-03-13 RX ADMIN — RISPERIDONE 1 MG: 1 TABLET ORAL at 09:44

## 2023-03-13 RX ADMIN — FLUTICASONE PROPIONATE 2 SPRAY: 50 SPRAY, METERED NASAL at 09:44

## 2023-03-13 RX ADMIN — RISPERIDONE 2 MG: 2 TABLET ORAL at 22:15

## 2023-03-13 ASSESSMENT — ACTIVITIES OF DAILY LIVING (ADL)
ADLS_ACUITY_SCORE: 31

## 2023-03-13 NOTE — PROGRESS NOTES
AOx4, calm and cooperative, irritable when disturbed frequently for cares this AM. Patient bui cooperative and pleasant towards evening. Cluster cares as much as possible.  RA, denies any chest pain or SOB. Endorses nasal congestion (flonase daily).   Good appetite.   Continent of B&B.  Independent in room.     Awaiting placement, psychiatry consult for therapies, CD consult and case management services. COVID isolation until 3/19.

## 2023-03-13 NOTE — PROGRESS NOTES
[facility]    Medicine Progress Note - Hospitalist Service, GOLD TEAM 19    Date of Admission:  3/10/2023    Assessment & Plan   Lin Jane is a 39 yo male admitted on 3/10/2023.  He has a past medical history of anxiety, depression, class II obesity, HTN, T2DM, gout,  polysubstance use disorder who was admitted from [place] ED on 3/11/23 after presenting there for polysubstance detox.  He tested positive for COVID-19 during routine screening in the ED.  Admitted to medical floor because he tested + for COVID-19.     Per ED note, this encounter lists pt's name incorrectly.  Pt has another chart with MRN [id number], whereby the correct spelling of his name is Chu Pimentel with a  1985.  He provided wrong name and  because he is paranoid about others using his records again him      #Confirmed COVID-19 infection    - Partially vaccinated   - He developed diffuse body aches, runny nose and nasal congestion on 3/9  - SARS CoV-2 PCR + on 3/11/23  - On RA  - No indications for COVID directed therapy  - Enoxaparin 40 mg sq daily for DVT prophylax  - Full barrier isolation until 3/19     #Polysubstance Use Disorder  - He uses methamphetamines, alcohol, tobacco and marijuana  - He expressed interest in getting into chemical dependency.   - Has been using ~1g meth over the past week by smoking and snorting  - Last marijuana and alcohol use he estimates was more than several days ago.  - Had previously abused opiates, and was then placed on Suboxone, but appears that last refill of this for 7 days was on 23 by Dr. Christianne Pelletier at [facility] per PDMP (in pt's other chart).   - U tox screen is positive for amphetamine only  - No nasal sores or ulcerations/bleeding/drainage w/ recent snorting of meth.  - Psychiatry consulted to discuss drug detox + paranoid (as below)     #Paranoia  #Hallucinations (auditory and visual)  #Insomnia   #MDD  #CULLEN  #Hx ADHD  - Pt has  "been having paranoid thoughts of people around him being hurt and/or raped.   - He has also been experiencing auditory hallucinations which are counting numbers, reciting letter, and giving him benign commands such as \"look left, go right, walk forward\". No threatening voices.   - Denied suicidal or homicidal ideation, no thoughts of harming himself or others.   - Had one episode of visual hallucinations last week whereby he saw \"eyes in a vent on the ceiling\".   - Per other chart, has previously been on Celexa, Lexapro, Strattera, Adderall, and Zyprexa.   - Clinical vignette raises c/f evolving schizophreniform vs schizophrenia vs other psychotic disorder. ?schizoaffective component, but denies mood changes.  - Seen by Psychiatry consult today and assessment was psychosis not otherwise specified  - Will treat patient with risperidone 1 mg now and 2 mg po qhs  - Continue Melatonin 5mg at bedtime      #Mild trench foot  - In ED, pt had noted walking around in same socks and shoes for two days that were wet, had been reporting some bilateral foot pain.   - Feet were dried and he was treated with a topical antifungal, given clean socks. On exam, mild erythema to forefeet, but no e/o frostbite or necrotic-appearing tissue.  - Continue to monitor     #HTN  - Previously on Amlodipine 10 mg daily and Lisinopril 30 mg daily.   - However, he has not been taking them for the past few weeks  - Restart both meds     #Hypokalemia  - K 3.3 on admission  - Replace per protocol     #T2DM  - Was on Metformin in the past, took for 2 days, then stopped as he didn't feel like taking it.   - His diabetes is managed with diet  - Check Hgba1c, was 6.6% in 05/2022 was 6.6%  - Fasting glucose was at measure this a.m.  - Start NovoLog medium intensity sliding scale insulin     #Hx gout  - Typically presents in L great toe, occasionally R ankle.   - No issues at the moment.  - Monitor     #Psychosocial stressors  #Homelessness  - Pt homeless, " "and in the context of substance use as above, as well as struggling with mental health.   - Currently resides at a shelter.   - Pt hopes to someday hold a stable job and live healthily again.  - SW consult for placement   - Patient strongly wishes to \"get [his] life in order\"  - Requests a PCP referral  - Wants an OP Psychiatrist  - Wants a         Diet: Regular Diet Adult    DVT Prophylaxis: Enoxaparin (Lovenox) SQ  Moy Catheter: Not present  Lines: None     Cardiac Monitoring: None  Code Status: Full Code      Clinically Significant Risk Factors Present on Admission        # Hypokalemia: Lowest K = 3.3 mmol/L in last 2 days, will replace as needed                # Obesity: Estimated body mass index is 38.4 kg/m  as calculated from the following:    Height as of this encounter: 1.753 m (5' 9\").    Weight as of this encounter: 117.9 kg (260 lb).           Disposition Plan      Expected Discharge Date: 03/19/2023                  Ken Bey DO, S  Hospitalist Service, GOLD TEAM [address] [place] [address]  Securely message with [employer] (more info)  Text page via [facility] Paging/Directory   See signed in provider for up to date coverage information  ______________________________________________________________________    Interval History   Patient is a bit cantankerous.  Patient is essentially here for 10 days of COVID isolation.  Touched base with psychiatry.  There is question whether patient requires re-admission to inpatient psychiatry after COVID isolation.  Patient reportedly has no where to go at present.  We will likely re-consult psychiatry midweek to re-evaluate.    Physical Exam   Vital Signs: Temp: 98.4  F (36.9  C) Temp src: Oral BP: (!) 146/83 Pulse: 64   Resp: 19 SpO2: 97 % O2 Device: None (Room air)    Weight: 260 lbs 0 oz    GENERAL: Alert and oriented x 3; no acute distress; well-nourished.  HEENT: Normocephalic; atraumatic; PERRLA; MMM.  CV: RRR; normal " S1, S2; no rubs, murmurs, or gallops.  RESP: Lung fields clear to aucultation B/L; no wheezing or crepitations.  GI: Abdomen is soft, nontender, nondistended; no organomegaly; normal bowel sounds.  : Deferred genital examination.   MSK: No clubbing, cyanosis, or edema.  DERM: Skin is intact; no rash, lesions, or skin breakdown.  NEURO: No focal deficits appreciated; strength & sensorium are grossly intact.  PSYCH: No active hallucinations; ornery & irritable.    Medical Decision Making       45 MINUTES SPENT BY ME on the date of service doing chart review, history, exam, documentation & further activities per the note.      Data     I have personally reviewed the following data over the past 24 hrs:    N/A  \   N/A   / N/A     N/A N/A N/A /  157 (H)   3.9 N/A N/A \       Imaging results reviewed over the past 24 hrs:   No results found for this or any previous visit (from the past 24 hour(s)).

## 2023-03-13 NOTE — PLAN OF CARE
Pulmonary:Pt is Afebrile; denies having SOB and/or chest pain; Reports mild occasional cough     Output:Continent of B&B.    Activity:Indepepdnent in his room    Skin: Scabs to knuckles and mild erythema to libby. feet.    Pain: Pt denies pain.    Neuro/CMS: Alert and oriented x4; Denies N/T    Dressing:None    IV/Drains:None    Pt had a psych consult today and was started on risperidone.    Plan: Pt will like to go for CD treatments; SW consult in place to have establish a plan with pt.

## 2023-03-13 NOTE — PROGRESS NOTES
"[id number]    VS: BP (!) 143/98   Pulse 84   Temp 98.9  F (37.2  C) (Oral)   Resp 18   Ht 1.753 m (5' 9\")   Wt 117.9 kg (260 lb)   SpO2 98%   BMI 38.40 kg/m       O2: Sating >90% on RA. Lung sounds clear. Denies chest pain and SOB.   Output: Voids spontaneously and adequately. Up  Independently    Last BM: PEDRO    Activity: Independent    Skin: Scabs to knuckles and slight erythema to feet    Pain: Denied    CMS: A&Ox4. Denies N/T.    Dressing: None    Diet: Regular. Appetite was good. BG checks with sliding scale    LDA: None    Equipment: IV pole, FWW, gait belt, and personal belongings. Call light within reach and uses appropriately.   Plan:  TBD CD treatment  meeting with pt.    Additional Info: Pt. Slept for most of the night, no acute changes this shift. Special precautions (COVID)  Continue POC.        "

## 2023-03-14 LAB
GLUCOSE BLDC GLUCOMTR-MCNC: 122 MG/DL (ref 70–99)
GLUCOSE BLDC GLUCOMTR-MCNC: 140 MG/DL (ref 70–99)
GLUCOSE BLDC GLUCOMTR-MCNC: 160 MG/DL (ref 70–99)
GLUCOSE BLDC GLUCOMTR-MCNC: 166 MG/DL (ref 70–99)

## 2023-03-14 PROCEDURE — 250N000013 HC RX MED GY IP 250 OP 250 PS 637

## 2023-03-14 PROCEDURE — 96372 THER/PROPH/DIAG INJ SC/IM: CPT

## 2023-03-14 PROCEDURE — 82962 GLUCOSE BLOOD TEST: CPT | Mod: 91

## 2023-03-14 PROCEDURE — G0378 HOSPITAL OBSERVATION PER HR: HCPCS

## 2023-03-14 PROCEDURE — 250N000011 HC RX IP 250 OP 636

## 2023-03-14 PROCEDURE — 250N000013 HC RX MED GY IP 250 OP 250 PS 637: Performed by: STUDENT IN AN ORGANIZED HEALTH CARE EDUCATION/TRAINING PROGRAM

## 2023-03-14 PROCEDURE — 99232 SBSQ HOSP IP/OBS MODERATE 35: CPT | Performed by: INTERNAL MEDICINE

## 2023-03-14 PROCEDURE — 250N000013 HC RX MED GY IP 250 OP 250 PS 637: Performed by: INTERNAL MEDICINE

## 2023-03-14 PROCEDURE — 250N000013 HC RX MED GY IP 250 OP 250 PS 637: Performed by: EMERGENCY MEDICINE

## 2023-03-14 RX ORDER — LISINOPRIL 10 MG/1
40 TABLET ORAL DAILY
Status: DISCONTINUED | OUTPATIENT
Start: 2023-03-15 | End: 2023-03-22 | Stop reason: HOSPADM

## 2023-03-14 RX ADMIN — TERBINAFINE HYDROCHLORIDE: 1 CREAM TOPICAL at 22:54

## 2023-03-14 RX ADMIN — ENOXAPARIN SODIUM 40 MG: 40 INJECTION SUBCUTANEOUS at 08:52

## 2023-03-14 RX ADMIN — TERBINAFINE HYDROCHLORIDE: 1 CREAM TOPICAL at 13:42

## 2023-03-14 RX ADMIN — AMLODIPINE BESYLATE 10 MG: 10 TABLET ORAL at 08:45

## 2023-03-14 RX ADMIN — FLUTICASONE PROPIONATE 2 SPRAY: 50 SPRAY, METERED NASAL at 08:51

## 2023-03-14 RX ADMIN — LISINOPRIL 30 MG: 10 TABLET ORAL at 08:43

## 2023-03-14 RX ADMIN — RISPERIDONE 2 MG: 2 TABLET ORAL at 22:48

## 2023-03-14 RX ADMIN — Medication 5 MG: at 22:48

## 2023-03-14 RX ADMIN — RISPERIDONE 1 MG: 1 TABLET ORAL at 08:42

## 2023-03-14 RX ADMIN — NICOTINE POLACRILEX 2 MG: 2 GUM, CHEWING ORAL at 19:24

## 2023-03-14 RX ADMIN — ACETAMINOPHEN 650 MG: 325 TABLET ORAL at 23:05

## 2023-03-14 RX ADMIN — NICOTINE POLACRILEX 2 MG: 2 GUM, CHEWING ORAL at 18:15

## 2023-03-14 ASSESSMENT — ACTIVITIES OF DAILY LIVING (ADL)
ADLS_ACUITY_SCORE: 31

## 2023-03-14 NOTE — PLAN OF CARE
Goal Outcome Evaluation:      Plan of Care Reviewed With: patient      End of shift Summary: See flowsheet for VS and detail assessments.    Changes this Shift:     Pulmonary: Lungs sounds clear.    Output: Pt is continent of B&B     Activity: Pt is independent     Pain: Denies pain.     Neuro/CMS: A&OX4, denies any N/T     Dressing: No dressings on     IV/Drains: No IV/drain     Additional info: Pt was calm and cooperative when interacted with. Sitter at bedside. Denies any SOB, chest pain and dizziness. BG this morning was 122, and lunch time was 140.     Plan:  Will continue with plan of care.

## 2023-03-14 NOTE — UTILIZATION REVIEW
Concurrent stay review; Secondary Review Determination - [facility]        Under the authority of the Utilization Management Committee, the utilization review process indicated a secondary review on the above patient.  The review outcome is based on review of the medical records, discussions with staff, and applying clinical experience noted on the date of the review.        (x) Observation/outpatient Status Appropriate - Concurrent stay review       RATIONALE FOR DETERMINATION:   Please refer to previous review.  Still no indication for inpatient admission.  Patient delayed discharge is related to disposition, there is no medical necessity for inpatient admission at the time of this review. If there is a change in patient status, please resend for review.    The information on this document is developed by the utilization review team in order for the business office to ensure compliance.  This only denotes the appropriateness of proper admission status and does not reflect the quality of care rendered.       The definitions of Inpatient Status and Observation Status used in making the determination above are those provided in the CMS Coverage Manual, Chapter 1 and Chapter 6, section 70.4.       Sincerely,    Fox Greene MD

## 2023-03-14 NOTE — PROGRESS NOTES
[facility]    Medicine Progress Note - Hospitalist Service, GOLD TEAM 16    Date of Admission:  3/10/2023    Assessment & Plan     A: Patient is a 37 y/o man who has anxiety, depression, obesity, hypertension, diabetes mellitus type II, gout and polysubstance abuse. Patient presented for polysubstance detox and was found to have mildly symptomatic Covid-19. This led to patient being admitted to the medical unit.     Per ED note, this encounter lists patient's name and birthdate incorrectly: Patient has another chart with MRN [id number], correct spelling of patient's name is Chu Pimentel and date of birth [birth year] Patient reportedly gave incorrect name and date of birth because he was paranoid about others using his records against him.    P:  1.) Mildly symptomatic Covid-19 infection with no evidence of superimposed bacterial infection:  - Patient reportedly had onset of symptoms on 09-Mar-2023  - Patient on isolation precautions for now - today is day 5 of isolation period.  - No indication for Covid-specific therapy at this point in time.    2.) Polysubstance use disorder; urine toxicology only positive for amphetamines:  - Patient reportedly uses methamphetamines, marijuana, alcohol and tobacco.   - Patient reportedly has been using about 1 gm methamphetamines over the past week by smoking and snorting.   - Patient reportedly last used marijuana and alcohol several days ago.    - Patient reportedly had used opioids in the past and had been on suboxone but last ref[place] was a 7 day [place] 21-Feb-2023 [place] [facility] [place]'s other chart[place]     3.) Paranoia, auditory and visual hallucinations, insomnia, MDD, CULLEN, history of ADHD: Patient currently on risperidal. Will consult Psychiatry.     4.) Mild trench foot: Patient on topical antifungals. Monitoring for symptoms.    5.) Hypertension: Patient had been on norvasc 10 mg daily and lisinopril 30  "mg daily; patient had not taken this for some time but these have been resumed. Will increase lisinopril to 40 mg daily and monitor response.    6.) Hypokalemia: Supplementing as needed.    7.) Diabetes mellitus type II, HbA1c 5.7% in spite of patient not being on treatment: Patient on insulin sliding scale.    8.) History of gout: Patient asymptomatic. Monitoring for changes.    9.) Other: Patient is homeless and resides in a shelter.         Diet: Regular Diet Adult    DVT Prophylaxis: Patient on lovenox 40 mg subcutaneous daily  Moy Catheter: Not present  Lines: None     Cardiac Monitoring: None  Code Status: Full Code      Clinically Significant Risk Factors Present on Admission                       # Obesity: Estimated body mass index is 38.4 kg/m  as calculated from the following:    Height as of this encounter: 1.753 m (5' 9\").    Weight as of this encounter: 117.9 kg (260 lb).           Disposition Plan     Expected Discharge Date: 03/19/2023                  Justin Espinoza MD  Hospitalist Service, GOLD TEAM [address] [place] [address]  Securely message with [employer] (more info)  Text page via [employer] Paging/Directory   See signed in provider for up to date coverage information  ______________________________________________________________________    Interval History     Patient noted generalized body aches. Patient noted no dyspnea. Patient noted mood was \"solid\" and noted no hallucinations.    Physical Exam   Vital Signs: Temp: 97.8  F (36.6  C) Temp src: Oral BP: (!) 158/79 Pulse: 76   Resp: 18 SpO2: 98 % O2 Device: None (Room air)    Weight: 260 lbs 0 oz    General: Patient comfortable, NAD.  Heart: RRR, S1 S2 w/o murmurs.  Lungs: Breath sounds present. No crackles/wheezes heard.  Abdomen: Soft, nontender.      Medical Decision Making             "

## 2023-03-14 NOTE — PLAN OF CARE
Goal Outcome Evaluation:      Plan of Care Reviewed With: patient              Plan of Care Reviewed With: patient       End of shift Summary: See flowsheet for VS and detail assessments.     Changes this Shift:      Pulmonary: Lungs sounds clear.     Output: Pt is continent of B&B      Activity: Pt is independent      Pain: Denies pain.      Neuro/CMS: A&OX4, denies any N/T      Dressing: No dressings on      IV/Drains: No IV/drain      Additional info: Pt was calm and cooperative when interacted with. Sitter at bedside. Denies any SOB, chest pain and dizziness. BG this morning was 122, and lunch time was 140.      Plan:  Will continue with plan of care.

## 2023-03-14 NOTE — PLAN OF CARE
AOx4, calm and cooperative on this shift; has a 1:1 in room, denies SI - Cluster cares as much as possible on AM shift to avoid pt irritation.  RA, denies any chest pain or SOB. Tolerated meds    Good appetite.   Continent of B&B.  Independent in room.      Awaiting placement, psychiatry consult for therapies, CD consult and case management services. COVID isolation until 3/19.

## 2023-03-15 LAB
CREAT SERPL-MCNC: 0.61 MG/DL (ref 0.67–1.17)
GFR SERPL CREATININE-BSD FRML MDRD: >90 ML/MIN/1.73M2
GLUCOSE BLDC GLUCOMTR-MCNC: 128 MG/DL (ref 70–99)
GLUCOSE BLDC GLUCOMTR-MCNC: 133 MG/DL (ref 70–99)
GLUCOSE BLDC GLUCOMTR-MCNC: 157 MG/DL (ref 70–99)
GLUCOSE BLDC GLUCOMTR-MCNC: 169 MG/DL (ref 70–99)
PLATELET # BLD AUTO: 237 10E3/UL (ref 150–450)

## 2023-03-15 PROCEDURE — 99232 SBSQ HOSP IP/OBS MODERATE 35: CPT | Performed by: PSYCHIATRY & NEUROLOGY

## 2023-03-15 PROCEDURE — 250N000013 HC RX MED GY IP 250 OP 250 PS 637: Performed by: INTERNAL MEDICINE

## 2023-03-15 PROCEDURE — 84132 ASSAY OF SERUM POTASSIUM: CPT | Performed by: INTERNAL MEDICINE

## 2023-03-15 PROCEDURE — 36415 COLL VENOUS BLD VENIPUNCTURE: CPT | Performed by: INTERNAL MEDICINE

## 2023-03-15 PROCEDURE — 250N000011 HC RX IP 250 OP 636

## 2023-03-15 PROCEDURE — 250N000013 HC RX MED GY IP 250 OP 250 PS 637: Performed by: PSYCHIATRY & NEUROLOGY

## 2023-03-15 PROCEDURE — 250N000013 HC RX MED GY IP 250 OP 250 PS 637

## 2023-03-15 PROCEDURE — 250N000013 HC RX MED GY IP 250 OP 250 PS 637: Performed by: STUDENT IN AN ORGANIZED HEALTH CARE EDUCATION/TRAINING PROGRAM

## 2023-03-15 PROCEDURE — 96372 THER/PROPH/DIAG INJ SC/IM: CPT

## 2023-03-15 PROCEDURE — 85049 AUTOMATED PLATELET COUNT: CPT | Performed by: INTERNAL MEDICINE

## 2023-03-15 PROCEDURE — 82962 GLUCOSE BLOOD TEST: CPT | Mod: 91

## 2023-03-15 PROCEDURE — 82565 ASSAY OF CREATININE: CPT | Performed by: INTERNAL MEDICINE

## 2023-03-15 PROCEDURE — 99232 SBSQ HOSP IP/OBS MODERATE 35: CPT | Performed by: INTERNAL MEDICINE

## 2023-03-15 PROCEDURE — G0378 HOSPITAL OBSERVATION PER HR: HCPCS

## 2023-03-15 RX ORDER — RISPERIDONE 2 MG/1
2 TABLET ORAL 2 TIMES DAILY
Status: DISCONTINUED | OUTPATIENT
Start: 2023-03-16 | End: 2023-03-22 | Stop reason: HOSPADM

## 2023-03-15 RX ORDER — QUETIAPINE FUMARATE 50 MG/1
50 TABLET, FILM COATED ORAL AT BEDTIME
Status: DISCONTINUED | OUTPATIENT
Start: 2023-03-15 | End: 2023-03-22 | Stop reason: HOSPADM

## 2023-03-15 RX ADMIN — ENOXAPARIN SODIUM 40 MG: 40 INJECTION SUBCUTANEOUS at 10:19

## 2023-03-15 RX ADMIN — Medication 5 MG: at 22:16

## 2023-03-15 RX ADMIN — NICOTINE POLACRILEX 2 MG: 2 GUM, CHEWING ORAL at 17:13

## 2023-03-15 RX ADMIN — AMLODIPINE BESYLATE 10 MG: 10 TABLET ORAL at 10:23

## 2023-03-15 RX ADMIN — TERBINAFINE HYDROCHLORIDE: 1 CREAM TOPICAL at 20:40

## 2023-03-15 RX ADMIN — ACETAMINOPHEN 650 MG: 325 TABLET ORAL at 13:32

## 2023-03-15 RX ADMIN — LISINOPRIL 40 MG: 10 TABLET ORAL at 10:23

## 2023-03-15 RX ADMIN — QUETIAPINE FUMARATE 50 MG: 50 TABLET ORAL at 22:16

## 2023-03-15 RX ADMIN — FLUTICASONE PROPIONATE 2 SPRAY: 50 SPRAY, METERED NASAL at 10:27

## 2023-03-15 RX ADMIN — NICOTINE POLACRILEX 2 MG: 2 GUM, CHEWING ORAL at 10:19

## 2023-03-15 RX ADMIN — NICOTINE POLACRILEX 2 MG: 2 GUM, CHEWING ORAL at 13:28

## 2023-03-15 RX ADMIN — RISPERIDONE 1 MG: 1 TABLET ORAL at 10:19

## 2023-03-15 ASSESSMENT — ACTIVITIES OF DAILY LIVING (ADL)
ADLS_ACUITY_SCORE: 31

## 2023-03-15 NOTE — CARE PLAN
VS:    O2: Sating >90% on RA. Lung sounds clear. Denies chest pain and SOB.   Output: Voids spontaneously and adequately.    Last BM:  Bowel sounds active x4. Passing flatus. X 3 occurrences watery diarrhea.   Activity: Independent in room    Skin: Intact   Pain: Pain was managed with Tylenol    CMS: A&Ox4. Denies N/T.    Dressing: None.   Diet: Regular. Appetite was good.  Denies N/V.    LDA: None.    Equipment: IV pole, FWW, gait belt, and personal belongings. Call light within reach and uses appropriately.   Plan: POC    Additional Info: Pt is having diarrhea X3 occurrences, has no med, Md paged, awaiting response.  Pt upset at 2200 for BG check, stated he is not diabetic and does not want to be poke or waking up when he is sleeping.

## 2023-03-15 NOTE — PLAN OF CARE
Goal Outcome Evaluation:      Plan of Care Reviewed With: patient    No acute changes this shift-     A&Ox4. CMS intact. Denies numbness/tingling, nausea,vomiting, SOB, and chest pain.  Up independently. Voiding adequate amounts. LBM 3/14  C/o body aches. Tylenol administered w/ relief.   Denies SI this shift- sitter at the bedside.   Pt states that the clothes he came with are soiled and would like to see if there is a clothing donation he can use for when he's ready for discharge. Social work contacted regarding clothing.     Will continue with POC and monitor.     Unable to assess due to medical condition

## 2023-03-15 NOTE — PROGRESS NOTES
[facility]    Medicine Progress Note - Hospitalist Service, GOLD TEAM 16    Date of Admission:  3/10/2023    Assessment & Plan     A: Patient is a 37 y/o man who has anxiety, depression, obesity, hypertension, diabetes mellitus type II, gout and polysubstance abuse. Patient presented for polysubstance detox and was found to have mildly symptomatic Covid-19. This led to patient being admitted to the medical unit.      Per ED note, this encounter lists patient's name and birthdate incorrectly: Patient has another chart with MRN [id number], correct spelling of patient's name is Chu Pimentel and date of birth [birth year]-. Patient reportedly gave incorrect name and date of birth because he was paranoid about others using his records against him.     P:  1.) Mildly symptomatic Covid-19 infection with no evidence of superimposed bacterial infection:  - Patient reportedly had onset of symptoms on 09-Mar-2023  - Patient on isolation precautions for now - today is day 6 of isolation period.  - No indication for Covid-specific therapy at this point in time.     2.) Polysubstance use disorder; urine toxicology only positive for amphetamines:  - Patient reportedly uses methamphetamines, marijuana, alcohol and tobacco.   - Patient reportedly has been using about 1 gm methamphetamines over the past week by smoking and snorting.   - Patient reportedly last used marijuana and alcohol several days ago.    - Patient reportedly had used opioids in the past and had been on suboxone but last refill was a 7 day supply on 21-Feb-2023 by Dr. Christianne Pelletier [place] [facility] [place] (in pt's other chart).      3.) Paranoia, auditory and visual hallucinations, insomnia, MDD, CULLEN, history of ADHD: Patient currently on risperidal. Psychiatry to see.      4.) Mild trench foot: Patient on topical antifungals. Monitoring for symptoms.     5.) Hypertension:   - Patient had been on norvasc 10 mg daily and  "lisinopril 30 mg daily but patient had not taken this for some time.  - Blood pressure now controlled on norvasc 10 mg daily and lisinopril 40 mg daily. Continuing to monitor.     6.) Hypokalemia: Supplementing as needed.     7.) Diabetes mellitus type II, HbA1c 5.7% in spite of patient not being on treatment: Patient on insulin sliding scale.     8.) History of gout: Patient asymptomatic. Monitoring for changes.     9.) Other: Patient is homeless and resides in a shelter.        Diet: Regular Diet Adult    DVT Prophylaxis: Lovenox 40 mg subcutaneous daily  Moy Catheter: Not present  Lines: None     Cardiac Monitoring: None  Code Status: Full Code      Clinically Significant Risk Factors Present on Admission                       # Obesity: Estimated body mass index is 38.4 kg/m  as calculated from the following:    Height as of this encounter: 1.753 m (5' 9\").    Weight as of this encounter: 117.9 kg (260 lb).           Disposition Plan     Expected Discharge Date: 03/19/2023                  Justin Espinoza MD  Hospitalist Service, [address]  Securely message with [employer] (more info)  Text page via [facility] Paging/Directory   See signed in provider for up to date coverage information  ______________________________________________________________________    Interval History     Patient noted generalized discomfort improved. Patient noted mood \"stable\". Patient noted no hallucinations.    Physical Exam   Vital Signs: Temp: 99  F (37.2  C) Temp src: Oral BP: 134/76 Pulse: 85   Resp: 16 SpO2: 97 % O2 Device: None (Room air)    Weight: 260 lbs 0 oz    General: Patient comfortable, NAD.  Heart: RRR, S1 S2 w/o murmurs.  Lungs: Breath sounds present. No crackles/wheezes heard.  Abdomen: Soft, nontender.      "

## 2023-03-16 LAB
GLUCOSE BLDC GLUCOMTR-MCNC: 107 MG/DL (ref 70–99)
POTASSIUM SERPL-SCNC: 4.2 MMOL/L (ref 3.4–5.3)

## 2023-03-16 PROCEDURE — 82962 GLUCOSE BLOOD TEST: CPT

## 2023-03-16 PROCEDURE — 250N000011 HC RX IP 250 OP 636

## 2023-03-16 PROCEDURE — G0378 HOSPITAL OBSERVATION PER HR: HCPCS

## 2023-03-16 PROCEDURE — 250N000013 HC RX MED GY IP 250 OP 250 PS 637: Performed by: INTERNAL MEDICINE

## 2023-03-16 PROCEDURE — 250N000013 HC RX MED GY IP 250 OP 250 PS 637: Performed by: PSYCHIATRY & NEUROLOGY

## 2023-03-16 PROCEDURE — 250N000013 HC RX MED GY IP 250 OP 250 PS 637

## 2023-03-16 PROCEDURE — 99232 SBSQ HOSP IP/OBS MODERATE 35: CPT | Performed by: INTERNAL MEDICINE

## 2023-03-16 PROCEDURE — 96372 THER/PROPH/DIAG INJ SC/IM: CPT

## 2023-03-16 PROCEDURE — 250N000013 HC RX MED GY IP 250 OP 250 PS 637: Performed by: STUDENT IN AN ORGANIZED HEALTH CARE EDUCATION/TRAINING PROGRAM

## 2023-03-16 RX ADMIN — Medication 5 MG: at 21:45

## 2023-03-16 RX ADMIN — AMLODIPINE BESYLATE 10 MG: 10 TABLET ORAL at 10:30

## 2023-03-16 RX ADMIN — NICOTINE POLACRILEX 2 MG: 2 GUM, CHEWING ORAL at 20:27

## 2023-03-16 RX ADMIN — ACETAMINOPHEN 650 MG: 325 TABLET ORAL at 16:53

## 2023-03-16 RX ADMIN — RISPERIDONE 2 MG: 2 TABLET ORAL at 10:30

## 2023-03-16 RX ADMIN — Medication 1 MG: at 20:24

## 2023-03-16 RX ADMIN — FLUTICASONE PROPIONATE 2 SPRAY: 50 SPRAY, METERED NASAL at 10:30

## 2023-03-16 RX ADMIN — QUETIAPINE FUMARATE 50 MG: 50 TABLET ORAL at 21:45

## 2023-03-16 RX ADMIN — ENOXAPARIN SODIUM 40 MG: 40 INJECTION SUBCUTANEOUS at 10:32

## 2023-03-16 RX ADMIN — NICOTINE POLACRILEX 2 MG: 2 GUM, CHEWING ORAL at 16:53

## 2023-03-16 RX ADMIN — RISPERIDONE 2 MG: 2 TABLET ORAL at 20:24

## 2023-03-16 RX ADMIN — LISINOPRIL 40 MG: 10 TABLET ORAL at 10:30

## 2023-03-16 RX ADMIN — NICOTINE POLACRILEX 2 MG: 2 GUM, CHEWING ORAL at 10:30

## 2023-03-16 ASSESSMENT — ACTIVITIES OF DAILY LIVING (ADL)
ADLS_ACUITY_SCORE: 31

## 2023-03-16 NOTE — CONSULTS
"  PSYCHIATRIC CONSULTATION, follow-up    Requesting Physician: Anne Pastor MD    Admission Date: 03/11/2023  Date of Service: 03/15/2023    It should be noted that the patient name has been misspelled and should be ANAMIKA RICCI with a different YOB: 1985. With this he has a different MR # [id number]    The patient was seen, his chart reviewed. He has been sitting in a chair watching TV and appeared to be quite comfortable although continued to hear \"voices\" telling him to do different things like \"get up and go\".  He has been feeling safe in the hospital but continued to maintain that in the shelter they stole his identity and his stuff. He belives that people were taking advantage of him.   He showed no grossly disruptive behavior here. He c/o problems sleeping at night stating that Melatonin was not helpful. He has been medication compliant. He has been on 1:1.    In the past he has been carrying various diagnoses including ADHD, MDD, psychotic disorder, stimulant use disorder, opioid use disorder and alcohol abuse. He had multiple ER visits, 6 times this year and nearly 30 in 2022. He has been hospitalized psychiatrically at [facility] \"more than once\". He has been prescribed Adderall, Strattera, Wellbutrin, Celexa and Lexapro. He has been on Hydroxyzine, Klonopin and Zyprexa. At some point he has been on Suboxone. Recently he has taken no psychotropic medications.    This is a 38 year old  unemployed white male with a long history of depression, anxiety, ADHD, psychosis and polysubstance abuse who initially presented to [facility] ED seeking detox from methamphetamines, hallucinations and and invasive thoughts. He reported hearing \"voices\" and felt that people around him stealing from him and taking advantage of him.  In addition he has not slept in 2 days and for a couple of  days prior to admission has been out walking around in wet shoes and has been having pain in " his feet. He was SARS CoV2 positive and was transferred to this hospital and placed on the medical floor. On 03/12/23 he was seen by Dr. Malcolm for initial psychiatric consult and started on Risperdal.    MEDICATIONS, psychotropic  Risperdal 1 mg QAM and 2 mg at bedtime  Melatonin 5 mg at bedtime    LABS: His Glucose by meter today was 128 at 10:01, 133 at 13:27 and 169 at 16:13    VS: Pulse - 85, BP - 134/76, Temp - 99, Resp - 16, SpO2 - 97%    MENTAL STATUS EXAMINATION  Revealed a normally built and normally developed, well nourished 38-year-old white male appearing his stated age. He was alert and oriented X 3. He was generally pleasant and cooperative. His speech was slightly pressured but coherent and goal related. His mood was elated, affect hypomanic. He denied SI or HI. He readily admitted to auditory hallucinations. He presented with persecutory delusions oriented to the recent past. He had marginal insight into his problems and his judgement did not seem to be impaired.    DIAGNOSTIC IMPRESSION  Bipolar Disorder, hypomanic with psychotic Features  R/O Schizoaffective Disorder, bipolar type  R/O Substance-induced Mood Disorder  ADHD by history  Stimulant Use Disorder  Opioid Use Disorder by history  Alcohol Use Disorder by history    Plan: I will increase Risperdal to 2 mg BID and add Seroquel 50 mg at bedtime. A mood stabilizer trial may also be considered. I will continue 1:1. The patient should be transferred to Inpatient Psychiatry when cleared medically.    Thanks,    Kendall Littlejohn MD

## 2023-03-16 NOTE — PROGRESS NOTES
[facility]    Medicine Progress Note - Hospitalist Service, GOLD TEAM 16    Date of Admission:  3/10/2023    Assessment & Plan     A: Patient is a 37 y/o man who has anxiety, depression, obesity, hypertension, diabetes mellitus type II, gout and polysubstance abuse. Patient presented for polysubstance detox and was found to have mildly symptomatic Covid-19. This led to patient being admitted to the medical unit.      Per ED note, this encounter lists patient's name and birthdate incorrectly: Patient has another chart with MRN [id number], correct spelling of patient's name is Chu Pimentel and date of birth [birth year] Patient reportedly gave incorrect name and date of birth because he was paranoid about others using his records against him.    P:  1.) Mildly symptomatic Covid-19 infection with no evidence of superimposed bacterial infection:  - Patient reportedly had onset of symptoms on 09-Mar-2023  - Patient on isolation precautions for now - today is day 7 of isolation period.  - No indication for Covid-specific therapy at this point in time.     2.) Polysubstance use disorder; urine toxicology only positive for amphetamines:  - Patient reportedly uses methamphetamines, marijuana, alcohol and tobacco.   - Patient reportedly has been using about 1 gm methamphetamines over the past week by smoking and snorting.   - Patient reportedly last used marijuana and alcohol several days ago.    - Patient reportedly had used opioids in the past and had been on suboxone but last refill was a 7 day supply on 21-Feb-2023 by Dr. Christianne Pelletier [facility] per PDMP (in pt's other chart).      3.) Paranoia, auditory and visual hallucinations, insomnia, MDD, CULLEN, history of ADHD: Patient currently on risperidal. Psychiatry to see.      4.) Mild trench foot: Patient on topical antifungals. Monitoring for symptoms.     5.) Hypertension:   - Patient had been on norvasc 10 mg daily and lisinopril  "30 mg daily but patient had not taken this for some time.  - Blood pressure now controlled on norvasc 10 mg daily and lisinopril 40 mg daily. Continuing to monitor.     6.) Hypokalemia: Supplementing as needed.     7.) Listed history of diabetes mellitus type II; HbA1c 5.7% in spite of patient not being on treatment: After review of blood glucose trends, will stop accuchecks and stop insulin sliding scale for now. Further monitoring of HbA1c and further management as outpatient.     8.) History of gout: Patient asymptomatic. Monitoring for changes.     9.) Other: Patient is homeless and resides in a shelter.        Diet: Regular Diet Adult    DVT Prophylaxis: Lovenox 40 mg subcutaneous daily.  Moy Catheter: Not present  Lines: None     Cardiac Monitoring: None  Code Status: Full Code      Clinically Significant Risk Factors Present on Admission                       # Obesity: Estimated body mass index is 38.4 kg/m  as calculated from the following:    Height as of this encounter: 1.753 m (5' 9\").    Weight as of this encounter: 117.9 kg (260 lb).           Disposition Plan     Expected Discharge Date: 03/19/2023                  Justin Espinoza MD  Hospitalist Service, GOLD TEAM [address] [place] [address]  Securely message with [employer] (more info)  Text page via [facility] Paging/Directory   See signed in provider for up to date coverage information  ______________________________________________________________________    Interval History     Patient noted still having some body discomfort and some fatigue. Patient noted no hallucinations and no suicidal ideation. Patient expressed frustration at accuchecks and blood draws.    Patient stated that he was not a diabetic. Patient stated that he was on metformin for about 1 week at one point in the past but never on insulin.    Physical Exam   Vital Signs: Temp: 98  F (36.7  C) Temp src: Oral BP: 137/80 Pulse: 79   Resp: 16 SpO2: 98 % O2 " Device: None (Room air)    Weight: 260 lbs 0 oz    General: Patient comfortable, NAD.  Heart: RRR, S1 S2 w/o murmurs.  Lungs: Breath sounds present. No crackles/wheezes heard.  Abdomen: Soft, nontender.    Medical Decision Making

## 2023-03-16 NOTE — PLAN OF CARE
"End of shift Summary: See flowsheet for VS and detail assessments.    Changes this Shift: A&O x4, VSS, sats maintained on RA. Covid positive. SI precautions, 1:1 sitter maintained. Pt requested trazodone instead of melatonin, provider contacted, no new orders at this time. Pt is adamant in stating he does not have diabetes despite RN education, stating at his 2200 BG check, \"this is the last time we're doing this, I don't even have diabetes.\" No bedtime insulin needed. No IV access. Continue POC.  "

## 2023-03-16 NOTE — PROGRESS NOTES
"End of shift Summary: See flowsheet for VS and assessment details.    A&Ox4, call light in reach, 1:1 sitter maintained for pt safety.  SI precautions maintained.  Special precautions maintained.  VSS on room air, continent of bowel and bladder, LBM 3/14.  Denied SOB, chest pain, numbness/tingling, nausea/vomiting, pain.  Pt adamantly refused overnight BG check, telling assistive personnel to \"get out\".  No observed skin concerns.    Plan: Continue POC.    "

## 2023-03-17 LAB — POTASSIUM SERPL-SCNC: 4.4 MMOL/L (ref 3.4–5.3)

## 2023-03-17 PROCEDURE — 99232 SBSQ HOSP IP/OBS MODERATE 35: CPT | Performed by: INTERNAL MEDICINE

## 2023-03-17 PROCEDURE — 250N000011 HC RX IP 250 OP 636

## 2023-03-17 PROCEDURE — 250N000013 HC RX MED GY IP 250 OP 250 PS 637: Performed by: STUDENT IN AN ORGANIZED HEALTH CARE EDUCATION/TRAINING PROGRAM

## 2023-03-17 PROCEDURE — 250N000013 HC RX MED GY IP 250 OP 250 PS 637: Performed by: PSYCHIATRY & NEUROLOGY

## 2023-03-17 PROCEDURE — 36415 COLL VENOUS BLD VENIPUNCTURE: CPT | Performed by: INTERNAL MEDICINE

## 2023-03-17 PROCEDURE — 250N000013 HC RX MED GY IP 250 OP 250 PS 637: Performed by: INTERNAL MEDICINE

## 2023-03-17 PROCEDURE — G0378 HOSPITAL OBSERVATION PER HR: HCPCS

## 2023-03-17 PROCEDURE — 96372 THER/PROPH/DIAG INJ SC/IM: CPT

## 2023-03-17 PROCEDURE — 84132 ASSAY OF SERUM POTASSIUM: CPT | Performed by: INTERNAL MEDICINE

## 2023-03-17 PROCEDURE — 250N000013 HC RX MED GY IP 250 OP 250 PS 637

## 2023-03-17 RX ADMIN — ENOXAPARIN SODIUM 40 MG: 40 INJECTION SUBCUTANEOUS at 08:42

## 2023-03-17 RX ADMIN — NICOTINE POLACRILEX 2 MG: 2 GUM, CHEWING ORAL at 16:30

## 2023-03-17 RX ADMIN — AMLODIPINE BESYLATE 10 MG: 10 TABLET ORAL at 08:42

## 2023-03-17 RX ADMIN — NICOTINE POLACRILEX 2 MG: 2 GUM, CHEWING ORAL at 20:36

## 2023-03-17 RX ADMIN — LISINOPRIL 40 MG: 10 TABLET ORAL at 08:41

## 2023-03-17 RX ADMIN — Medication 5 MG: at 22:00

## 2023-03-17 RX ADMIN — QUETIAPINE FUMARATE 50 MG: 50 TABLET ORAL at 22:01

## 2023-03-17 RX ADMIN — RISPERIDONE 2 MG: 2 TABLET ORAL at 08:42

## 2023-03-17 RX ADMIN — TERBINAFINE HYDROCHLORIDE: 1 CREAM TOPICAL at 22:01

## 2023-03-17 RX ADMIN — FLUTICASONE PROPIONATE 2 SPRAY: 50 SPRAY, METERED NASAL at 08:42

## 2023-03-17 RX ADMIN — NICOTINE POLACRILEX 2 MG: 2 GUM, CHEWING ORAL at 08:50

## 2023-03-17 RX ADMIN — RISPERIDONE 2 MG: 2 TABLET ORAL at 20:36

## 2023-03-17 RX ADMIN — NICOTINE POLACRILEX 2 MG: 2 GUM, CHEWING ORAL at 19:36

## 2023-03-17 ASSESSMENT — ACTIVITIES OF DAILY LIVING (ADL)
ADLS_ACUITY_SCORE: 31

## 2023-03-17 NOTE — PROGRESS NOTES
[facility]    Medicine Progress Note - Hospitalist Service, GOLD TEAM 16    Date of Admission:  3/10/2023    Assessment & Plan     A: Patient is a 38 y/o man who has anxiety, depression, obesity, hypertension, diabetes mellitus type II, gout and polysubstance abuse. Patient presented for polysubstance detox and was found to have mildly symptomatic Covid-19. This led to patient being admitted to the medical unit.      Per ED note, this encounter lists patient's name and birthdate incorrectly: Patient has another chart with MRN [id number], correct spelling of patient's name is Chu Pimentel and date of birth [birth year] Patient reportedly gave incorrect name and date of birth because he was paranoid about others using his records against him.     P:  1.) Mildly symptomatic Covid-19 infection with no evidence of superimposed bacterial infection:  - Patient reportedly had onset of symptoms on 09-Mar-2023  - Patient on isolation precautions for now - today is day 8 of isolation period.  - No indication for Covid-specific therapy at this point in time.     2.) Polysubstance use disorder; urine toxicology only positive for amphetamines:  - Patient reportedly uses methamphetamines, marijuana, alcohol and tobacco.   - Patient reportedly has been using about 1 gm methamphetamines over the past week by smoking and snorting.   - Patient reportedly last used marijuana and alcohol several days ago.    - Patient reportedly had used opioids in the past and had been on suboxone but last refill was a 7 day supply on 21-Feb-2023 by Dr. Christianne Pelletier at [facility] [place], [place] per PDMP (in pt's other chart).      3.) Paranoia, auditory and visual hallucinations, insomnia, MDD, CULLEN, history of ADHD: Patient currently on risperidal and seroquel. Anticipate that patient will eventually go to inpatient psychiatric unit.     4.) Mild trench foot: Patient on topical antifungals. Monitoring for symptoms.     5.)  "Hypertension:   - Patient had been on norvasc 10 mg daily and lisinopril 30 mg daily but patient had not taken this for some time.  - Blood pressure now controlled on norvasc 10 mg daily and lisinopril 40 mg daily. Continuing to monitor.     6.) Hypokalemia, corrected for now: No active intervention indicated.     7.) Listed history of diabetes mellitus type II; HbA1c 5.7% in spite of patient not being on treatment: After review of blood glucose trends, will stop accuchecks and stop insulin sliding scale for now. Further monitoring of HbA1c and further management as outpatient.     8.) History of gout: Patient asymptomatic. Monitoring for changes.     9.) Other: Patient is homeless and resides in a shelter.        Diet: Regular Diet Adult    Moy Catheter: Not present  Lines: None     Cardiac Monitoring: None  Code Status: Full Code      Clinically Significant Risk Factors Present on Admission                       # Obesity: Estimated body mass index is 38.4 kg/m  as calculated from the following:    Height as of this encounter: 1.753 m (5' 9\").    Weight as of this encounter: 117.9 kg (260 lb).           Disposition Plan      Expected Discharge Date: 03/21/2023                  Justin Espinoza MD  Hospitalist Service, [address]  Securely message with [facility] (more info)  Text page via [employer] Paging/Directory   See signed in provider for up to date coverage information  ______________________________________________________________________    Interval History     Patient noted feeling well. Patient noted feeling like he had been here too long    Physical Exam   Vital Signs: Temp: 97.1  F (36.2  C) Temp src: Oral BP: 127/73 Pulse: 105   Resp: 16 SpO2: 96 % O2 Device: None (Room air)    Weight: 260 lbs 0 oz    General: Patient comfortable, NAD.  Lungs: Breath sounds present. No crackles/wheezes heard.    Medical Decision Making             "

## 2023-03-17 NOTE — PLAN OF CARE
VSS. A&Ox4. Denies thoughts of harming self or others.1:1 at bedside. Admitted to medical floor d/t pos Covid result. Denies SOB, cough, and other Covid symptoms. Expected to transfer to inpt psych when isolation is no longer necessary if a bed is available. Independent in room. Pt noted having insomnia some nights and received melatonin and Seroquel at HS. Slept well overnight. No acute changes this shift.  Smitha Kumar RN on 3/17/2023 at 7:07 AM

## 2023-03-17 NOTE — PLAN OF CARE
Pt up independently in room. Denies SI. Asymptomatic COVID. 1:1 sitter at bedside. Pt upset about lab draws and requesting them to be stopped. Writer spoke with Dr. Espinoza and daily lab draw for potassium discontinued. Pt able to make needs known.

## 2023-03-17 NOTE — PROGRESS NOTES
[id number]    Patient A&O x4 and able to make his needs known. Denied CP, lightheadedness, dizziness, numbness or tingling. Denied SOB/OZUNA. On Covid isolation. 1:1 for safety. Denied SI, HI. Incentive spirometer encouraged and done several times. Independent in the room and demonstrates the ability to use call light appropriately. Will continue  with POC.

## 2023-03-17 NOTE — PROGRESS NOTES
Pt on K replacement protocol. He allowed labs to be drawn this morning but told lab techs that he does not want anymore lab draws. Dr. Espinoza notified.

## 2023-03-18 PROCEDURE — 250N000011 HC RX IP 250 OP 636

## 2023-03-18 PROCEDURE — 250N000013 HC RX MED GY IP 250 OP 250 PS 637: Performed by: STUDENT IN AN ORGANIZED HEALTH CARE EDUCATION/TRAINING PROGRAM

## 2023-03-18 PROCEDURE — 96372 THER/PROPH/DIAG INJ SC/IM: CPT

## 2023-03-18 PROCEDURE — 250N000013 HC RX MED GY IP 250 OP 250 PS 637

## 2023-03-18 PROCEDURE — 250N000013 HC RX MED GY IP 250 OP 250 PS 637: Performed by: INTERNAL MEDICINE

## 2023-03-18 PROCEDURE — 250N000013 HC RX MED GY IP 250 OP 250 PS 637: Performed by: PSYCHIATRY & NEUROLOGY

## 2023-03-18 PROCEDURE — 99232 SBSQ HOSP IP/OBS MODERATE 35: CPT | Performed by: INTERNAL MEDICINE

## 2023-03-18 PROCEDURE — G0378 HOSPITAL OBSERVATION PER HR: HCPCS

## 2023-03-18 RX ORDER — TRAZODONE HYDROCHLORIDE 50 MG/1
50 TABLET, FILM COATED ORAL
Status: DISCONTINUED | OUTPATIENT
Start: 2023-03-18 | End: 2023-03-20

## 2023-03-18 RX ADMIN — ACETAMINOPHEN 650 MG: 325 TABLET ORAL at 21:18

## 2023-03-18 RX ADMIN — NICOTINE POLACRILEX 4 MG: 4 GUM, CHEWING BUCCAL at 19:35

## 2023-03-18 RX ADMIN — RISPERIDONE 2 MG: 2 TABLET ORAL at 09:44

## 2023-03-18 RX ADMIN — ENOXAPARIN SODIUM 40 MG: 40 INJECTION SUBCUTANEOUS at 09:45

## 2023-03-18 RX ADMIN — AMLODIPINE BESYLATE 10 MG: 10 TABLET ORAL at 09:44

## 2023-03-18 RX ADMIN — FLUTICASONE PROPIONATE 2 SPRAY: 50 SPRAY, METERED NASAL at 09:49

## 2023-03-18 RX ADMIN — Medication 5 MG: at 21:18

## 2023-03-18 RX ADMIN — TERBINAFINE HYDROCHLORIDE: 1 CREAM TOPICAL at 19:36

## 2023-03-18 RX ADMIN — NICOTINE POLACRILEX 2 MG: 2 GUM, CHEWING ORAL at 15:08

## 2023-03-18 RX ADMIN — LISINOPRIL 40 MG: 10 TABLET ORAL at 11:23

## 2023-03-18 RX ADMIN — NICOTINE POLACRILEX 2 MG: 2 GUM, CHEWING ORAL at 09:50

## 2023-03-18 RX ADMIN — QUETIAPINE FUMARATE 50 MG: 50 TABLET ORAL at 21:18

## 2023-03-18 RX ADMIN — RISPERIDONE 2 MG: 2 TABLET ORAL at 19:35

## 2023-03-18 ASSESSMENT — ACTIVITIES OF DAILY LIVING (ADL)
ADLS_ACUITY_SCORE: 31

## 2023-03-18 NOTE — PROGRESS NOTES
[facility]    Medicine Progress Note - Hospitalist Service, GOLD TEAM 16    Date of Admission:  3/10/2023    Assessment & Plan      A: Patient is a 38 y/o man who has anxiety, depression, obesity, hypertension, diabetes mellitus type II, gout and polysubstance abuse. Patient presented for polysubstance detox and was found to have mildly symptomatic Covid-19. This led to patient being admitted to the medical unit.      Per ED note, this encounter lists patient's name and birthdate incorrectly: Patient has another chart with MRN [id number], correct spelling of patient's name is Chu Pimentel and date of birth [birth year]-. Patient reportedly gave incorrect name and date of birth because he was paranoid about others using his records against him.    Previous notes had stated that patient had onset of Covid-19 symptoms on 09-Mar-2023 but patient reported today that he had no Covid-19 symptoms prior to presentation. As patient was found to be positive for Covid-19 on 11-Mar-2023, day 1 of isolation would have been 12-Mar-2023.    Patient noted some acute right foot pain today that appears to be musculoskeletal, possibly a sprain.    P:  1.) Mildly symptomatic Covid-19 infection with no evidence of superimposed bacterial infection:  - Patient reported no Covid-19 symptoms prior to presentation - patient was found to be positive for Covid-19 on 11-Mar-2023.  - Patient on isolation precautions for now - day 1 of isolation was 12-Mar-2023 and today is day 7 of isolation.  - No indication for Covid-specific therapy at this point in time.     2.) Polysubstance use disorder; urine toxicology only positive for amphetamines:  - Patient reportedly uses methamphetamines, marijuana, alcohol and tobacco.   - Patient reportedly has been using about 1 gm methamphetamines over the past week by smoking and snorting.   - Patient reportedly last used marijuana and alcohol several days ago.    -  "Patient reportedly had used opioids in the past and had been on suboxone but last refill was a 7 day supply on 21-Feb-2023 by Dr. Christianne Pelletier at [facility] [place] (in pt's other chart).      3.) Paranoia, auditory and visual hallucinations, insomnia, MDD, CULLEN, history of ADHD: Patient currently on risperidal and seroquel. Anticipate that patient will eventually go to inpatient psychiatric unit.     4.) Mild trench foot: Patient on topical antifungals. Monitoring for symptoms.     5.) Hypertension:   - Patient had been on norvasc 10 mg daily and lisinopril 30 mg daily but patient had not taken this for some time.  - Blood pressure now controlled on norvasc 10 mg daily and lisinopril 40 mg daily. Continuing to monitor.     6.) Hypokalemia, corrected for now: No active intervention indicated.     7.) Listed history of diabetes mellitus type II; HbA1c 5.7% in spite of patient not being on treatment: After review of blood glucose trends, will stop accuchecks and stop insulin sliding scale for now. Further monitoring of HbA1c and further management as outpatient.     8.) History of gout: Patient asymptomatic. Monitoring for changes.     9.) Acute right foot pain, possible right ankle sprain: Acetaminophen as needed.    10.) Nicotine dependence, cigarette smoking, ongoing: Will change from 2 mg nicotine gum to 4 mg nicotine gum hourly as needed.     11.) Insomnia, patient not currently on trazodone: Ordering trazodone 50 mg nightly as needed for insomnia.    12.) Other: Patient is homeless and resides in a shelter.          Diet: Regular Diet Adult    DVT Prophylaxis: Patient on lovenox 40 mg subcutaneous daily.  Moy Catheter: Not present  Lines: None     Cardiac Monitoring: None  Code Status: Full Code      Clinically Significant Risk Factors Present on Admission                       # Obesity: Estimated body mass index is 38.4 kg/m  as calculated from the following:    Height as of this encounter: 1.753 m (5' 9\").    " Weight as of this encounter: 117.9 kg (260 lb).           Disposition Plan      Expected Discharge Date: 03/21/2023        Discharge Comments: off covid precautions 3/21, to inpatient psych.          Justin Espinoza MD  Hospitalist Service, GOLD TEAM [address] [place] [address]  Securely message with [employer] (more info)  Text page via [facility] Paging/Directory   See signed in provider for up to date coverage information  ______________________________________________________________________    Interval History     Patient noted feeling well but noted some discomfort in lateral right foot with walking. Patient asked for an increase in frequency of nicotine gum. Patient also asked for an increase in trazodone dose for insomnia.    Patient stated that he had no Covid-19 symptoms prior to his presentation on 10-Mar-2023. Patient reported that he had no change in sense of smell or taste, had no cough and had no body aches. Patient noted having some body aches after admission to the hospital that have since resolved. Patient also noted having a few days of diarrhea after admission but noted that diarrhea had also resolved.    Physical Exam   Vital Signs: Temp: 97.8  F (36.6  C) Temp src: Oral BP: 131/75 Pulse: 98   Resp: 16 SpO2: 100 % O2 Device: None (Room air)    Weight: 260 lbs 0 oz    General: Patient comfortable, NAD.  Heart: RRR, S1 S2 w/o murmurs.  Lungs: Breath sounds present. No crackles/wheezes heard.  Extremities: No swelling in foot. No tenderness to palpation. No discomfort with passive movement of right foot.    Medical Decision Making

## 2023-03-18 NOTE — PLAN OF CARE
VS: VSS   O2: Stable on RA.   Output: Void in toilet.   Last BM: 3/17 per pt.   Activity: Up ad corin in room.   Up for meals? Yes   Skin: Intact.   Pain: Mild left foot pain from gout. Otherwise denies pain in rest of body.   CMS: Intact.   Dressing: None.   Diet: Regular.   LDA: None.           Additional Info: 1:1 sitter in room for safety and SI. Nicorette gum given PRN.      OBS Goals:       -diagnostic tests and consults completed and resulted - Yes  -safe disposition plan has been identified - SW involved.  -treatment plan for his substance use has been established Ongoing.  Nurse to notify provider when observation goals have been met and patient is ready for discharge.

## 2023-03-18 NOTE — PLAN OF CARE
Goal Outcome Evaluation:     Pt A&O x4, VSS, LS CTA throughout, denies SOB, no cough noted. Pt c/o pain 2/10 to BLE, medicated cream applied to affected area. Pt has 1x1 supervision throughout entire shift with q15 min charting. Denied SI, HI.  Independent in room and demonstrates ability to use call light when necessary. No concerns noted, continue with POC.     8pm, 12am, 4am Observation Goals:    Diagnostic tests and consults completed and resulted. -Met  Safe disposition plan- not met.   Treatment plan for his substance use has been established- not met.

## 2023-03-19 PROCEDURE — 99232 SBSQ HOSP IP/OBS MODERATE 35: CPT | Performed by: INTERNAL MEDICINE

## 2023-03-19 PROCEDURE — 250N000013 HC RX MED GY IP 250 OP 250 PS 637

## 2023-03-19 PROCEDURE — 96372 THER/PROPH/DIAG INJ SC/IM: CPT

## 2023-03-19 PROCEDURE — 250N000013 HC RX MED GY IP 250 OP 250 PS 637: Performed by: PSYCHIATRY & NEUROLOGY

## 2023-03-19 PROCEDURE — 250N000013 HC RX MED GY IP 250 OP 250 PS 637: Performed by: INTERNAL MEDICINE

## 2023-03-19 PROCEDURE — G0378 HOSPITAL OBSERVATION PER HR: HCPCS

## 2023-03-19 PROCEDURE — 250N000011 HC RX IP 250 OP 636

## 2023-03-19 RX ADMIN — ENOXAPARIN SODIUM 40 MG: 40 INJECTION SUBCUTANEOUS at 08:54

## 2023-03-19 RX ADMIN — NICOTINE POLACRILEX 4 MG: 4 GUM, CHEWING BUCCAL at 13:00

## 2023-03-19 RX ADMIN — NICOTINE POLACRILEX 4 MG: 4 GUM, CHEWING BUCCAL at 18:52

## 2023-03-19 RX ADMIN — AMLODIPINE BESYLATE 10 MG: 10 TABLET ORAL at 08:54

## 2023-03-19 RX ADMIN — QUETIAPINE FUMARATE 50 MG: 50 TABLET ORAL at 21:18

## 2023-03-19 RX ADMIN — RISPERIDONE 2 MG: 2 TABLET ORAL at 21:18

## 2023-03-19 RX ADMIN — Medication 5 MG: at 21:18

## 2023-03-19 RX ADMIN — FLUTICASONE PROPIONATE 2 SPRAY: 50 SPRAY, METERED NASAL at 08:57

## 2023-03-19 RX ADMIN — LISINOPRIL 40 MG: 10 TABLET ORAL at 08:54

## 2023-03-19 RX ADMIN — ACETAMINOPHEN 650 MG: 325 TABLET ORAL at 09:20

## 2023-03-19 RX ADMIN — NICOTINE POLACRILEX 4 MG: 4 GUM, CHEWING BUCCAL at 16:12

## 2023-03-19 RX ADMIN — RISPERIDONE 2 MG: 2 TABLET ORAL at 08:54

## 2023-03-19 RX ADMIN — NICOTINE POLACRILEX 4 MG: 4 GUM, CHEWING BUCCAL at 09:20

## 2023-03-19 ASSESSMENT — ACTIVITIES OF DAILY LIVING (ADL)
ADLS_ACUITY_SCORE: 31

## 2023-03-19 NOTE — PROGRESS NOTES
[facility]    Medicine Progress Note - Hospitalist Service, GOLD TEAM 16    Date of Admission:  3/10/2023    Assessment & Plan     A: Patient is a 38 y/o man who has anxiety, depression, obesity, hypertension, diabetes mellitus type II, gout and polysubstance abuse. Patient presented for polysubstance detox and was found to have mildly symptomatic Covid-19. This led to patient being admitted to the medical unit.      Per ED note, this encounter lists patient's name and birthdate incorrectly: Patient has another chart with MRN [id number], correct spelling of patient's name is Chu Pimentel and date of birth [birth year]-. Patient reportedly gave incorrect name and date of birth because he was paranoid about others using his records against him.     Previous notes had stated that patient had onset of Covid-19 symptoms on 09-Mar-2023 but patient has since reported that he had no Covid-19 symptoms prior to presentation. As patient was found to be positive for Covid-19 on 11-Mar-2023, day 1 of isolation would have been 12-Mar-2023.     Patient noted some acute right foot pain today that appears to be musculoskeletal, possibly a sprain.     P:  1.) Mildly symptomatic Covid-19 infection with no evidence of superimposed bacterial infection:  - Patient reported no Covid-19 symptoms prior to presentation - patient was found to be positive for Covid-19 on 11-Mar-2023.  - Patient on isolation precautions for now - day 1 of isolation was 12-Mar-2023 and today is day 8 of isolation.  - No indication for Covid-specific therapy at this point in time.  - Patient has been declining lab draws for lovenox monitoring. After discussion with patient, plan is for patient to remain on prophylactic lovenox until isolation period ends on 22-Mar-2023 with frequent ambulation for DVT prophylaxis after that.     2.) Polysubstance use disorder; urine toxicology only positive for amphetamines:  - Patient  reportedly uses methamphetamines, marijuana, alcohol and tobacco.   - Patient reportedly has been using about 1 gm methamphetamines over the past week by smoking and snorting.   - Patient reportedly last used marijuana and alcohol several days ago.    - Patient reportedly had used opioids in the past and had been on suboxone but last refill was a 7 day supply on 21-Feb-2023 [place] [facility] [place]'s other chart[place]      3.) Paranoia, auditory and visual hallucinations, insomnia, MDD, CULLEN, history of ADHD: Patient currently on risperidal and seroquel. Anticipate that patient will eventually go to inpatient psychiatric unit.     4.) Mild trench foot: Patient on topical antifungals. Monitoring for symptoms.     5.) Hypertension:   - Patient had been on norvasc 10 mg daily and lisinopril 30 mg daily but patient had not taken this for some time.  - Blood pressure now controlled on norvasc 10 mg daily and lisinopril 40 mg daily. Continuing to monitor.     6.) Hypokalemia, corrected for now: No active intervention indicated.     7.) Listed history of diabetes mellitus type II; HbA1c 5.7% in spite of patient not being on treatment: After review of blood glucose trends, will stop accuchecks and stop insulin sliding scale for now. Further monitoring of HbA1c and further management as outpatient.     8.) History of gout: Patient asymptomatic. Monitoring for changes.      9.) Acute right foot pain, possible right ankle sprain: Acetaminophen as needed.     10.) Nicotine dependence, cigarette smoking, ongoing: Changed from 2 mg nicotine gum to 4 mg nicotine gum hourly as needed.     11.) Insomnia, patient had not been on trazodone: Ordering trazodone 50 mg nightly as needed for insomnia.     12.) Other: Patient is homeless and resides in a shelter.        Diet: Regular Diet Adult    DVT Prophylaxis: Patient to be on subcutaneous lovenox until 21-Mar-2023  Moy Catheter: Not present  Lines: None     Cardiac  "Monitoring: None  Code Status: Full Code      Clinically Significant Risk Factors Present on Admission                       # Obesity: Estimated body mass index is 38.4 kg/m  as calculated from the following:    Height as of this encounter: 1.753 m (5' 9\").    Weight as of this encounter: 117.9 kg (260 lb).           Disposition Plan      Expected Discharge Date: 03/21/2023        Discharge Comments: off covid precautions 3/22, to inpatient psych.          Justin Espinoza MD  Hospitalist Service, [address]  Securely message with [employer] (more info)  Text page via [facility] Paging/Directory   See signed in provider for up to date coverage information  ______________________________________________________________________    Interval History     Patient noted feeling well. Patient noted some body aches. Patient noted no fever, no chills, no dyspnea and no new problems.    Physical Exam   Vital Signs: Temp: 97.7  F (36.5  C) Temp src: Oral BP: 133/73 Pulse: 91   Resp: 19 SpO2: 95 % O2 Device: None (Room air)    Weight: 260 lbs 0 oz    General: Patient comfortable, NAD.  Heart: RRR, S1 S2 w/o murmurs.  Lungs: Breath sounds present. No crackles/wheezes heard.  Abdomen: Soft, nontender.      Medical Decision Making             "

## 2023-03-19 NOTE — PROGRESS NOTES
Patient doesn't like getting labs drawn or being poked. Confirm lab necessities prior to lab arriving to room and RN to explain reasoning of labs to pt first for improved compliance.     Pain: reports moderate lvl 5 pain in RLE, prn apap given   Neuro: alert and oriented x4, 1:1 for safety    Mood/behavior: calm/cooperative most of shift, intermittent mood lability   Resp: WDL RA, denies SOB/chest pain   Cardiac/Peripheral Vascular: WDL  GI/: voids spontaneously  Activity: independent in room  IV access devices: no PIV   LDA/skin: skin intact excepted where noted on flowsheet     Plan: continue POC  Discharge: inpatient psych when off COVID precautions on 3/21

## 2023-03-19 NOTE — PLAN OF CARE
A&Ox4, agitated with flat affect  Denies SOB, CP or nausea  Up independently   Voiding in BR   Able to rest during the night  Sitter in the room for safety  Plan: inpt psych

## 2023-03-19 NOTE — PROGRESS NOTES
A&Ox4, RA, no N/V, Vital signs stable,   Ind in room, voids in toilet, LBM 3/17  Reg diet, 100% appetite of all meals served.  Reports mild pain on BLE due to gout,Tylenol given, Lamlsil cream applied.   Skin intact, No IV acces  Covid positive, per MD, will be taken off positive covid precautions next Wednesday 3/22 since he did not have symptoms   1:1 sitter for safety and SI, pt denies SI

## 2023-03-20 PROCEDURE — 250N000013 HC RX MED GY IP 250 OP 250 PS 637: Performed by: INTERNAL MEDICINE

## 2023-03-20 PROCEDURE — 250N000013 HC RX MED GY IP 250 OP 250 PS 637: Performed by: PSYCHIATRY & NEUROLOGY

## 2023-03-20 PROCEDURE — 96372 THER/PROPH/DIAG INJ SC/IM: CPT | Performed by: INTERNAL MEDICINE

## 2023-03-20 PROCEDURE — 99232 SBSQ HOSP IP/OBS MODERATE 35: CPT | Performed by: PSYCHIATRY & NEUROLOGY

## 2023-03-20 PROCEDURE — 99232 SBSQ HOSP IP/OBS MODERATE 35: CPT | Performed by: INTERNAL MEDICINE

## 2023-03-20 PROCEDURE — G0378 HOSPITAL OBSERVATION PER HR: HCPCS

## 2023-03-20 PROCEDURE — 250N000011 HC RX IP 250 OP 636: Performed by: INTERNAL MEDICINE

## 2023-03-20 PROCEDURE — 250N000013 HC RX MED GY IP 250 OP 250 PS 637

## 2023-03-20 RX ADMIN — TERBINAFINE HYDROCHLORIDE: 1 CREAM TOPICAL at 09:08

## 2023-03-20 RX ADMIN — LISINOPRIL 40 MG: 10 TABLET ORAL at 09:06

## 2023-03-20 RX ADMIN — RISPERIDONE 2 MG: 2 TABLET ORAL at 09:06

## 2023-03-20 RX ADMIN — AMLODIPINE BESYLATE 10 MG: 10 TABLET ORAL at 09:07

## 2023-03-20 RX ADMIN — NICOTINE POLACRILEX 4 MG: 4 GUM, CHEWING BUCCAL at 13:28

## 2023-03-20 RX ADMIN — TERBINAFINE HYDROCHLORIDE: 1 CREAM TOPICAL at 21:06

## 2023-03-20 RX ADMIN — FLUTICASONE PROPIONATE 2 SPRAY: 50 SPRAY, METERED NASAL at 09:07

## 2023-03-20 RX ADMIN — ENOXAPARIN SODIUM 40 MG: 40 INJECTION SUBCUTANEOUS at 09:07

## 2023-03-20 RX ADMIN — NICOTINE POLACRILEX 4 MG: 4 GUM, CHEWING BUCCAL at 19:32

## 2023-03-20 RX ADMIN — QUETIAPINE FUMARATE 50 MG: 50 TABLET ORAL at 22:42

## 2023-03-20 RX ADMIN — NICOTINE POLACRILEX 4 MG: 4 GUM, CHEWING BUCCAL at 09:16

## 2023-03-20 RX ADMIN — Medication 5 MG: at 22:42

## 2023-03-20 RX ADMIN — RISPERIDONE 2 MG: 2 TABLET ORAL at 21:05

## 2023-03-20 ASSESSMENT — ACTIVITIES OF DAILY LIVING (ADL)
ADLS_ACUITY_SCORE: 31

## 2023-03-20 NOTE — PLAN OF CARE
A&Ox4, labile mood with flat affect  Denies SOB, CP or nausea  Up independently   Voiding in BR   Able to rest during the night  Sitter in the room for safety  Plan: inpt psych

## 2023-03-20 NOTE — PLAN OF CARE
Patient A&Ox4. Able to make needs known.   Denies SOB, Chest pain, N/V, N/T.   1:1 discontinued this shift. See Psychiatry note.  Ind in room.   Voiding spontaneously.   LBM 3/20      Awaiting placement once off COVID precautions. Continue with POC.

## 2023-03-20 NOTE — PLAN OF CARE
Pulmonary:Pt is afebrile; denies having SOB and/or chest pain.    Output: Continent of B&B.     Activity:Indepepdnent in his room     Skin: Old scabs to knuckles and mild erythema to libby. feet.     Pain: Pt denies pain.     Neuro/CMS: Alert and oriented x4; Denies N/T     Dressing:None     IV/Drains: None      Plan: Continue with current POC./awiting placement after off COVID precautions.

## 2023-03-20 NOTE — PROGRESS NOTES
[facility]    Medicine Progress Note - Hospitalist Service, GOLD TEAM 16    Date of Admission:  3/10/2023    Assessment & Plan     A: Patient is a 38 y/o man who has anxiety, depression, obesity, hypertension, diabetes mellitus type II, gout and polysubstance abuse. Patient presented for polysubstance detox and was found to have mildly symptomatic Covid-19. This led to patient being admitted to the medical unit.      Per ED note, this encounter lists patient's name and birthdate incorrectly: Patient has another chart with MRN [id number], correct spelling of patient's name is Chu Pimentel and date of birth [birth year] Patient reportedly gave incorrect name and date of birth because he was paranoid about others using his records against him.     Previous notes had stated that patient had onset of Covid-19 symptoms on 09-Mar-2023 but patient has since reported that he had no Covid-19 symptoms prior to presentation. As patient was found to be positive for Covid-19 on 11-Mar-2023, day 1 of isolation would have been 12-Mar-2023.     Patient noted some acute right foot pain that appears to be musculoskeletal, possibly a sprain.    P:  1.) Mildly symptomatic Covid-19 infection with no evidence of superimposed bacterial infection:  - Patient reported no Covid-19 symptoms prior to presentation - patient was found to be positive for Covid-19 on 11-Mar-2023.  - Patient on isolation precautions for now - day 1 of isolation was 12-Mar-2023 and today is day 8 of isolation.  - No indication for Covid-specific therapy at this point in time.  - Patient has been declining lab draws for lovenox monitoring. After discussion with patient, plan is for patient to remain on prophylactic lovenox until isolation period ends on 22-Mar-2023 with frequent ambulation for DVT prophylaxis after that.     2.) Polysubstance use disorder; urine toxicology only positive for amphetamines:  - Patient  reportedly uses methamphetamines, marijuana, alcohol and tobacco.   - Patient reportedly has been using about 1 gm methamphetamines over the past week by smoking and snorting.   - Patient reportedly last used marijuana and alcohol several days ago.    - Patient reportedly had used opioids in the past and had been on suboxone but last refill was a 7 day supply on 21-Feb-2023 by Dr. Christianne Pelletier at [facility] per PDMP (in pt's other chart).      3.) Paranoia, auditory and visual hallucinations, insomnia, MDD, CULLEN, history of ADHD: Patient currently on risperidal and seroquel. Anticipate that patient will eventually go to inpatient psychiatric unit.     4.) Mild trench foot: Patient on topical antifungals. Monitoring for symptoms.     5.) Hypertension:   - Patient had been on norvasc 10 mg daily and lisinopril 30 mg daily but patient had not taken this for some time.  - Blood pressure now controlled on norvasc 10 mg daily and lisinopril 40 mg daily. Continuing to monitor.     6.) Hypokalemia, corrected for now: No active intervention indicated.     7.) Listed history of diabetes mellitus type II; HbA1c 5.7% in spite of patient not being on treatment: After review of blood glucose trends, will stop accuchecks and stop insulin sliding scale for now. Further monitoring of HbA1c and further management as outpatient.     8.) History of gout: Patient asymptomatic. Monitoring for changes.      9.) Acute right foot pain, possible right ankle sprain: Acetaminophen as needed.     10.) Nicotine dependence, cigarette smoking, ongoing: Changed from 2 mg nicotine gum to 4 mg nicotine gum hourly as needed.     11.) Insomnia: Patient now on trazodone 50 mg nightly as needed for insomnia.     12.) DVT prophylaxis: Role of lovenox in reducing DVT risk with Covid-19 infection was discussed with patient. After discussion, patient still expressed desire for subcutaneous lovenox to be discontinued. Patient expressed understanding of the risk of  "developing venous thromboemboli without pharmacological DVT prophylaxis.    13.) Other: Patient is homeless and resides in a shelter.              Diet: Regular Diet Adult    Moy Catheter: Not present  Lines: None     Cardiac Monitoring: None  Code Status: Full Code      Clinically Significant Risk Factors Present on Admission                       # Obesity: Estimated body mass index is 38.4 kg/m  as calculated from the following:    Height as of this encounter: 1.753 m (5' 9\").    Weight as of this encounter: 117.9 kg (260 lb).           Disposition Plan      Expected Discharge Date: 03/22/2023        Discharge Comments: off covid precautions 3/22, to inpatient psych.          Justin Espinoza MD  Hospitalist Service, [facility] 16  [facility]  Securely message with [employer] (more info)  Text page via [employer] Paging/Directory   See signed in provider for up to date coverage information  ______________________________________________________________________    Interval History     Patient expressed frustration at still having a sitter. Patient also asked for lovenox injections to be stopped.    Patient noted no further hallucinations, no suicidal ideation and no homicidal ideation.    Physical Exam   Vital Signs: Temp: 98.2  F (36.8  C) Temp src: Oral BP: 122/79 Pulse: 68   Resp: 18 SpO2: 96 % O2 Device: None (Room air)    Weight: 260 lbs 0 oz    General: Patient comfortable, NAD. Somewhat irritable.    Medical Decision Making             " Opioid Pregnancy And Lactation Text: These medications can lead to premature delivery and should be avoided during pregnancy. These medications are also present in breast milk in small amounts.

## 2023-03-20 NOTE — CONSULTS
"  PSYCHIATRIC CONSULTATION, follow-up    Requesting Physician: Justin Espinoza MD    Admission Date: 03/11/2023  Date of Service: 03/20/2023    The patient was seen, his chart reviewed.  He seems to be doing better. He tolerated a higher doses of Risperdal well his \"voices are almost gone\" he has been sleeping better with Seroquel and it is unclear why Trazodone was added to his regimen, he did not take it anyway. He continues to be slightly hypomanic but I will defer starting mood stabilizer to his inpatient psychiatrist as his mood and behavioral patterns will be better monitored when he will be transferred to the psychiatric unit.   He now states that the presence of a bedside attendant interferes with his night sleep.      Chu is a 38 year old  unemployed white male with a long history of depression, anxiety, ADHD, psychosis and polysubstance abuse who initially presented to [place] ED seeking detox from methamphetamines, hallucinations and and invasive thoughts. He reported hearing \"voices\" and felt that people around him stealing from him and taking advantage of him. In addition he has not slept in 2 days and for a couple of days prior to admission has been out walking around in wet shoes and has been having pain in his feet. He was SARS CoV2 positive and was transferred to this hospital and placed on the medical floor. His COVID has been asymptomatic. On 03/12/23 he was seen by Dr. Malcolm for initial psychiatric consult and started on Risperdal. I saw him for the first time on 03/15/2023. I increased the dose of Risperdal and added Seroquel at bedtime.    MEDICATIONS, psychotropic   Risperdal 2 mg BID  Melatonin 5 mg at bedtime   Seroquel 50 mg at bedtime    MENTAL STATUS EXAMINATION   Revealed a normally built and normally developed, well nourished 38-year-old white male appearing his stated age. He was alert and oriented X 3. He was generally pleasant and cooperative. His speech was slightly " pressured but coherent and goal related. His mood was elated, affect hypomanic. He denied SI or HI. His auditory hallucinations are dissipating. He denied craving drugs or alcohol. He presented with persecutory delusions oriented to the recent past and not to current situation. He had marginal insight into his problems but his judgement did not seem to be impaired. He was willing to go to Inpatient Psych.    DIAGNOSTIC IMPRESSION   Bipolar Disorder, hypomanic with psychotic features   R/O Schizoaffective Disorder, bipolar type   R/O Substance-induced Mood Disorder   ADHD by history   Stimulant Use Disorder   Opioid Use Disorder by history   Alcohol Use Disorder by history    Plan: I will discontinue Trazodone and add Seroquel 25 mg HS PRN. I will discontinue 1:1. Continue the rest of medications in the same doses. Transfer to Inpatient Psych when cleared medically.    Thanks,    Kendall Littlejohn MD

## 2023-03-21 PROCEDURE — G0378 HOSPITAL OBSERVATION PER HR: HCPCS

## 2023-03-21 PROCEDURE — 250N000013 HC RX MED GY IP 250 OP 250 PS 637

## 2023-03-21 PROCEDURE — 250N000013 HC RX MED GY IP 250 OP 250 PS 637: Performed by: PSYCHIATRY & NEUROLOGY

## 2023-03-21 PROCEDURE — 250N000013 HC RX MED GY IP 250 OP 250 PS 637: Performed by: INTERNAL MEDICINE

## 2023-03-21 PROCEDURE — 99232 SBSQ HOSP IP/OBS MODERATE 35: CPT | Performed by: INTERNAL MEDICINE

## 2023-03-21 RX ADMIN — TERBINAFINE HYDROCHLORIDE: 1 CREAM TOPICAL at 22:24

## 2023-03-21 RX ADMIN — RISPERIDONE 2 MG: 2 TABLET ORAL at 09:44

## 2023-03-21 RX ADMIN — RISPERIDONE 2 MG: 2 TABLET ORAL at 22:23

## 2023-03-21 RX ADMIN — QUETIAPINE FUMARATE 50 MG: 50 TABLET ORAL at 22:23

## 2023-03-21 RX ADMIN — LISINOPRIL 40 MG: 10 TABLET ORAL at 09:44

## 2023-03-21 RX ADMIN — Medication 5 MG: at 22:23

## 2023-03-21 RX ADMIN — AMLODIPINE BESYLATE 10 MG: 10 TABLET ORAL at 09:44

## 2023-03-21 RX ADMIN — NICOTINE POLACRILEX 4 MG: 4 GUM, CHEWING BUCCAL at 18:05

## 2023-03-21 RX ADMIN — NICOTINE POLACRILEX 4 MG: 4 GUM, CHEWING BUCCAL at 09:44

## 2023-03-21 RX ADMIN — NICOTINE POLACRILEX 4 MG: 4 GUM, CHEWING BUCCAL at 13:24

## 2023-03-21 ASSESSMENT — ACTIVITIES OF DAILY LIVING (ADL)
ADLS_ACUITY_SCORE: 32
ADLS_ACUITY_SCORE: 31
ADLS_ACUITY_SCORE: 31
ADLS_ACUITY_SCORE: 32
ADLS_ACUITY_SCORE: 31
ADLS_ACUITY_SCORE: 32
ADLS_ACUITY_SCORE: 31
ADLS_ACUITY_SCORE: 31
ADLS_ACUITY_SCORE: 32
ADLS_ACUITY_SCORE: 31

## 2023-03-21 NOTE — UTILIZATION REVIEW
Concurrent stay review; Secondary Review Determination    Under the authority of the Utilization Management Committee, the utilization review process indicated a secondary review on the above patient. The review outcome is based on review of the medical records, discussions with staff, and applying clinical experience noted on the date of the review.    (x) Observation Status Appropriate - Concurrent stay review        RATIONALE FOR DETERMINATION: 37 yo male admitted on 3/10/2023.  He has a past medical history of anxiety, depression, class II obesity, HTN, T2DM, gout,  polysubstance use disorder who was admitted from [place] ED on 3/11/23 after presenting there for polysubstance detox.  He tested positive for COVID-19 during routine screening in the ED.  Admitted to medical floor because he tested + for COVID-19.  Observation care appropriate awaiting transfer to inpatient psychiatry while patient is in COVID-19 isolation precautions.    Patient is clinically improving and there is no clear indication to change patient's status to inpatient. The severity of illness, intensity of service provided, expected LOS and risk for adverse outcome make the care appropriate for observation.    This document was produced using voice recognition software    The information on this document is developed by the utilization review team in order for the business office to ensure compliance. This only denotes the appropriateness of proper admission status and does not reflect the quality of care rendered.    The definitions of Inpatient Status and Observation Status used in making the determination above are those provided in the CMS Coverage Manual, Chapter 1 and Chapter 6, section 70.4.    Sincerely,    Dru Gardner MD  Utilization Review  Physician Advisor  [facility].

## 2023-03-21 NOTE — PROGRESS NOTES
[facility]    Medicine Progress Note - Hospitalist Service, GOLD TEAM 16    Date of Admission:  3/10/2023    Assessment & Plan   Patient is a 38 y/o man who has anxiety, depression, obesity, hypertension, diabetes mellitus type II, gout and polysubstance abuse. Patient presented for polysubstance detox and was found to have mildly symptomatic Covid-19. This led to patient being admitted to the medical unit.      Per ED note, this encounter lists patient's name and birthdate incorrectly: Patient has another chart with MRN [id number], correct spelling of patient's name is Chu Pimentel and date of birth [birth year]-. Patient reportedly gave incorrect name and date of birth because he was paranoid about others using his records against him.     Previous notes had stated that patient had onset of Covid-19 symptoms on 09-Mar-2023 but patient has since reported that he had no Covid-19 symptoms prior to presentation. As patient was found to be positive for Covid-19 on 11-Mar-2023, day 1 of isolation would have been 12-Mar-2023.     Patient noted some acute right foot pain that appears to be musculoskeletal, possibly a sprain.    #Mildly symptomatic COVID-19 infection with no evidence of superimposed bacterial infection  - Patient reported no Covid-19 symptoms prior to presentation - patient was found to be positive for Covid-19 on 11-Mar-2023.  - Patient on isolation precautions for now - day 1 of isolation was 12-Mar-2023 and today is day 8 of isolation.  - No indication for Covid-specific therapy at this point in time.  - Patient has been declining lab draws for lovenox monitoring. After discussion with patient, plan is for patient to remain on prophylactic lovenox until isolation period ends on 22-Mar-2023 with frequent ambulation for DVT prophylaxis after that.     #Polysubstance use disorder; urine toxicology only positive for amphetamines  - Patient reportedly uses  methamphetamines, marijuana, alcohol and tobacco.   - Patient reportedly has been using about 1 gm methamphetamines over the past week by smoking and snorting.   - Patient reportedly last used marijuana and alcohol several days ago.    - Patient reportedly had used opioids in the past and had been on suboxone but last refill was a 7 day supply on 21-Feb-2023 by Dr. Christianne Pelletier at [facility] per PDMP (in pt's other chart).      #Paranoia, auditory and visual hallucinations, insomnia, MDD, CULLEN, history of ADHD   - Patient currently on risperidal and Seroquel  - Anticipate that patient will eventually go to inpatient psychiatric unit     #Mild trench foot  - Patient on topical antifungals  - Monitoring for symptoms     #Hypertension   - Patient had been on Norvasc 10 mg daily and lisinopril 30 mg daily but patient had not taken this for some time.  - Blood pressure now controlled on Norvasc 10 mg daily and lisinopril 40 mg daily  - Continuing to monitor     #Hypokalemia, corrected for now  - No active intervention indicated.     #Listed history of diabetes mellitus type II  - HbA1c 5.7% in spite of patient not being on treatment  - After review of blood glucose trends, will stop accuchecks and stop insulin sliding scale for now  - Further monitoring of HbA1c and further management as outpatient.     #Hx gout  - Patient asymptomatic  - Monitoring for changes.      #Acute right foot pain, possible right ankle sprain  - Acetaminophen as needed.     #Nicotine dependence, cigarette smoking, ongoing  - Changed from 2 mg nicotine gum to 4 mg nicotine gum hourly as needed     #Insomnia  - Patient now on trazodone 50 mg nightly as needed for insomnia.     #DVT prophylaxis  - Role of lovenox in reducing DVT risk with COVID-19 infection was discussed with patient  - After discussion, patient still expressed desire for subcutaneous lovenox to be discontinued  - Patient expressed understanding of the risk of developing venous  "thromboemboli without pharmacological DVT prophylaxis.    #Other   - Patient is homeless and resides in a shelter       Diet: Regular Diet Adult    DVT Prophylaxis: Patient refusing Lovenox  Moy Catheter: Not present  Lines: None     Cardiac Monitoring: None  Code Status: Full Code      Clinically Significant Risk Factors Present on Admission                       # Obesity: Estimated body mass index is 38.4 kg/m  as calculated from the following:    Height as of this encounter: 1.753 m (5' 9\").    Weight as of this encounter: 117.9 kg (260 lb).           Disposition Plan     Expected Discharge Date: 03/22/2023        Discharge Comments: off covid precautions 3/22, to inpatient psych.          Ken Bey DO, S  Hospitalist Service, GOLD TEAM [address] [place] [address]  Securely message with [employer] (more info)  Text page via [employer] Paging/Directory   See signed in provider for up to date coverage information  ______________________________________________________________________    Interval History   Patient sleeping comfortably upon entering room.  Patient conversing readily with nursing staff.  Per nursing staff, no acute events or concerns.  Patient's COVID-19 isolation is over tomorrow.  Patient will be inpatient psychiatry.    Physical Exam   Vital Signs: Temp: 98.5  F (36.9  C) Temp src: Oral BP: (!) 140/74 Pulse: 95   Resp: 18 SpO2: 96 % O2 Device: None (Room air)    Weight: 260 lbs 0 oz    GENERAL: Alert and oriented x 3; no acute distress; well-nourished.  HEENT: Normocephalic; atraumatic; PERRLA; MMM.  CV: RRR; normal S1, S2; no rubs, murmurs, or gallops.  RESP: Lung fields clear to aucultation B/L; no wheezing or crepitations.  GI: Abdomen is soft, nontender, nondistended; no organomegaly; normal bowel sounds.  : Deferred genital examination.   MSK: No clubbing, cyanosis, or edema.  DERM: Skin is intact; no rash, lesions, or skin breakdown.  NEURO: No focal deficits " appreciated; strength & sensorium are grossly intact.  PSYCH: No active hallucinations; affect, insight appear within normal limits.    Medical Decision Making       45 MINUTES SPENT BY ME on the date of service doing chart review, history, exam, documentation & further activities per the note.      Data         Imaging results reviewed over the past 24 hrs:   No results found for this or any previous visit (from the past 24 hour(s)).

## 2023-03-21 NOTE — CARE PLAN
"VS: BP (!) 140/82 (BP Location: Left arm)   Pulse 89   Temp 98.4  F (36.9  C) (Oral)   Resp 18   Ht 1.753 m (5' 9\")   Wt 117.9 kg (260 lb)   SpO2 97%   BMI 38.40 kg/m     O2: Sating >90% on RA. Lung sounds clear. Denies chest pain and SOB.   Output: Voids spontaneously and adequately.    Last BM:  Bowel sounds active x4. Passing flatus. No BM   Activity: Independent in room   Skin: intact   Pain: Denies    CMS: A&Ox4. Denies N/T.    Dressing: None.   Diet: Regular. Appetite was good. Denies N/V.    LDA: None   Equipment:  and personal belongings. Call light within reach and uses appropriately.   Plan:  awaiting patient  pshch placement   Additional Info: Out of 1:1 sitter      "

## 2023-03-22 ENCOUNTER — TELEPHONE (OUTPATIENT)
Dept: BEHAVIORAL HEALTH | Facility: CLINIC | Age: 38
End: 2023-03-22

## 2023-03-22 ENCOUNTER — TELEPHONE (OUTPATIENT)
Dept: BEHAVIORAL HEALTH | Facility: CLINIC | Age: 38
End: 2023-03-22
Payer: MEDICAID

## 2023-03-22 VITALS
SYSTOLIC BLOOD PRESSURE: 124 MMHG | RESPIRATION RATE: 16 BRPM | HEIGHT: 69 IN | OXYGEN SATURATION: 97 % | BODY MASS INDEX: 38.51 KG/M2 | WEIGHT: 260 LBS | TEMPERATURE: 97.5 F | DIASTOLIC BLOOD PRESSURE: 77 MMHG | HEART RATE: 74 BPM

## 2023-03-22 PROCEDURE — 250N000013 HC RX MED GY IP 250 OP 250 PS 637: Performed by: INTERNAL MEDICINE

## 2023-03-22 PROCEDURE — 250N000013 HC RX MED GY IP 250 OP 250 PS 637: Performed by: PSYCHIATRY & NEUROLOGY

## 2023-03-22 PROCEDURE — 99232 SBSQ HOSP IP/OBS MODERATE 35: CPT

## 2023-03-22 PROCEDURE — 99238 HOSP IP/OBS DSCHRG MGMT 30/<: CPT | Performed by: INTERNAL MEDICINE

## 2023-03-22 PROCEDURE — G0378 HOSPITAL OBSERVATION PER HR: HCPCS

## 2023-03-22 RX ADMIN — AMLODIPINE BESYLATE 10 MG: 10 TABLET ORAL at 08:45

## 2023-03-22 RX ADMIN — LISINOPRIL 40 MG: 10 TABLET ORAL at 08:45

## 2023-03-22 RX ADMIN — FLUTICASONE PROPIONATE 2 SPRAY: 50 SPRAY, METERED NASAL at 08:46

## 2023-03-22 RX ADMIN — RISPERIDONE 2 MG: 2 TABLET ORAL at 08:45

## 2023-03-22 ASSESSMENT — ACTIVITIES OF DAILY LIVING (ADL)
ADLS_ACUITY_SCORE: 32
ADLS_ACUITY_SCORE: 31
ADLS_ACUITY_SCORE: 32

## 2023-03-22 NOTE — PLAN OF CARE
Goal Outcome Evaluation: ongoing   Alert & oriented x4. Up ad corin. Reported LBM 3/21 . Denies pain or discomfort. Denies SOB or respiratory distress. Appetite 100% for meals. Nicotine gum given x1.

## 2023-03-22 NOTE — CARE PLAN
"VS: /68 (BP Location: Left arm, Patient Position: Supine, Cuff Size: Adult Regular)   Pulse 98   Temp 98  F (36.7  C) (Oral)   Resp 18   Ht 1.753 m (5' 9\")   Wt 117.9 kg (260 lb)   SpO2 96%   BMI 38.40 kg/m     O2: Sating >90% on RA. Lung sounds clear. Denies chest pain and SOB.   Output: Voids spontaneously and adequately no difficulty .   Last BM: Bowel sounds active x4. Passing flatus. LBM 3/20/23   Activity: Independent in the room   Skin: Intact   Pain: Denies pain   CMS: A&Ox4. Denies N/T.    Dressing: None   Diet: Regular. Appetite was good. Denies N/V.    LDA: None.    Equipment: Personal belongings. Call light within reach and uses appropriately.   Plan:  Inpatient psych   Additional Info: Cont POC.      "

## 2023-03-22 NOTE — PROGRESS NOTES
Pt left AMA, stated that psych provider told him that he would stay in the psych unit for a short period of time and also needed a second opinion. Pt decided to leave hospital and stated that will find his own psych services. MD updated.

## 2023-03-22 NOTE — TELEPHONE ENCOUNTER
S: [facility] , Charge Nurse Vaishali calling at 8:10am  about a 38 year old/Male presenting with SI,  AH with RN reporting Pt is COVID Clear for [facility].       B: Pt arrived via Self . Presenting problem, stressors: Pt initially presented, and continues to endorse SI w/AH.  Pt presenting with paranoia, worsening mood, increasing psychosis, will not participate in safety planning, believes that people have stolen his identity  Pt went to medical as tested Positive for Covid, but not medically clear for [facility].       Pt is off his medication and is requesting voluntary [facility] to help him stabilize. Pt doesn't feel safe to leave the hospital.      Pt affect: Guarded, Irritable  and Paranoid    Pt Dx: Major Depressive Disorder, Generalized Anxiety Disorder, ADHD and Substance Use Disorder: amphetamine use disorder  Previous [facility] hx? Yes- dates unknown     Pt endorses passive SI - has thought about going outside and dying from exposure  Hx of suicide attempt? No  Pt has a remote hx of SIB via headbanging to get voices to stop.   Pt denies HI   Pt endorses auditory hallucinations .      Hx of aggression/violence, sexual offences, legal concerns, or Epic care plan? describe: none that  could find  Current concerns for aggression this visit? No  Does pt have a history of Civil Commitment? unknown  Is Pt their own guardian? Yes     Pt is prescribed medication. Is patient medication compliant? No  Pt denies OP services   CD concerns: Actively using/consuming meth  Acute or chronic medical concerns: None  Does Pt present with specific needs, assistive devices, or exclusionary criteria? None        Pt is ambulatory  Pt is able to perform ADLs independently    R: Patient cleared and ready for behavioral bed placement: Yes     Pt placed on adult worklist pending Bed availability.    Intake DID page Infection Prevention to review chart so Pt is officially deemed medically clear.    Infect prev paged @ 1:15pm to review chart and  clearance from Covid for [facility].    Communicated to Intake -  Awaiting Infection Prevention callback for COVID Clearance.

## 2023-03-22 NOTE — PLAN OF CARE
Goal Outcome Evaluation: adequate for care transition   Alert & oriented x4. Denies SOB or respiratory distress, occasional dry cough noted, lung sounds clear. Up ad corin in room, walked in hallway this morning. Reported LBM 3/21. Denies pain or discomfort. Appetite 100% for meals. Awaiting for psych placement.

## 2023-03-22 NOTE — CONSULTS
"      Psychiatry Consultation; Follow up              Reason for Consult, requesting source:    Rec for inpatient psych   Requesting source: [facility]    Labs and imaging reviewed, seen by AKBAR Jenkins CNP    Total time spent in chart review, patient interview and coordination of care; 35 minutes                Interim history:    Chu is a 38 year old  unemployed white male with a long history of depression, anxiety, ADHD, psychosis and polysubstance abuse who initially presented to [facility] ED seeking detox from methamphetamines, hallucinations and and invasive thoughts. He reported hearing \"voices\" and felt that people around him stealing from him and taking advantage of him. In addition he has not slept in 2 days and for a couple of days prior to admission has been out walking around in wet shoes and has been having pain in his feet. He was SARS CoV2 positive and was transferred to this hospital and placed on the medical floor. His COVID has been asymptomatic. On 03/12/23 he was seen by Dr. Malcolm for initial psychiatric consult and started on Risperdal. Eron saw him for the first time on 03/15/2023 and increased the dose of Risperdal and added Seroquel at bedtime. He was seen again by Dr. MOSES 3/20 who recommended inpatient psychiatry for further evaluation of psychosis.     Today, patient seems to be doing better however is still guarded and worried about people's intentions. The voices are \"almost gone.\" He is voluntary to going to inpatient psychiatry for further evaluation and for stabilization on medications.          Current Medications:       amLODIPine  10 mg Oral Daily     fluticasone  2 spray Both Nostrils Daily     lisinopril  40 mg Oral Daily     melatonin  5 mg Oral At Bedtime     QUEtiapine  50 mg Oral At Bedtime     risperiDONE  2 mg Oral BID     terbinafine   Topical BID              MSE:   Appearance: awake, alert, adequately groomed and dressed in hospital scrubs  Attitude:  " "somewhat cooperative  Eye Contact:  fair  Mood:  \"fair\"  Affect:  : guarded  Speech:  clear, coherent  Psychomotor Behavior:  no evidence of tardive dyskinesia, dystonia, or tics  Muscle strength and tone: baseline   Thought Process:  linear  Associations:  no loose associations  Thought Content:  no evidence of suicidal ideation or homicidal ideation  Insight:  partial  Judgement:  limited  Oriented to:  time, person, and place  Attention Span and Concentration:  fair  Recent and Remote Memory:  intact    Vital signs:  Temp: 97.5  F (36.4  C) Temp src: Oral BP: 124/77 Pulse: 74   Resp: 16 SpO2: 97 % O2 Device: None (Room air)   Height: 175.3 cm (5' 9\") Weight: 117.9 kg (260 lb)  Estimated body mass index is 38.4 kg/m  as calculated from the following:    Height as of this encounter: 1.753 m (5' 9\").    Weight as of this encounter: 117.9 kg (260 lb).             DSM-5 Diagnosis:   Bipolar Disorder, hypomanic with psychotic features   R/O Schizoaffective Disorder, bipolar type   R/O Substance-induced Mood Disorder   ADHD by history   Stimulant Use Disorder   Opioid Use Disorder by history   Alcohol Use Disorder by history          Assessment:   Lin is 38 year old male who has mainly been followed by Dr. Littlejohn and I agree with his recommendations for inpatient psychiatry for further evaluation and stabilization before discharged to community.           Summary of Recommendations:     --Inpatient psychiatry recommended. Patient is voluntary     Wanda Kern, LUCIENP-BC  Consult/Liaison Psychiatry   [facility]       \"Much or all of the text in this note was generated through the use of Dragon Dictate voice to text software. Errors in spelling or words which appear to be out of contact are unintentional, may be present due having escaped editing\"           "

## 2023-03-22 NOTE — DISCHARGE SUMMARY
[facility]  Hospitalist Discharge Summary      Date of Admission:  3/10/2023  Date of Discharge:  3/22/2023  1:26 PM  Discharging Provider: Ken Bey DO  Discharge Service: Hospitalist Service, GOLD TEAM 16    Discharge Diagnoses   Asymptomatic COVID-19 infection  Polysubstance use disorder  Urine toxicology only positive for amphetamines  Paranoia  Auditory and visual hallucinations  Insomnia  Major depressive disorder  Generalized anxiety disorder  History of ADHD  Mild trench foot  Hypertension  Hypokalemia  History of type 2 diabetes mellitus  History of gout  Acute right foot pain  Possible right ankle sprain  Nicotine dependence  Insomnia  Homelessness    Follow-ups Needed After Discharge   Patient left AGAINST MEDICAL ADVICE.    Unresulted Labs Ordered in the Past 30 Days of this Admission     No orders found from 2/8/2023 to 3/11/2023.      Patient left AGAINST MEDICAL ADVICE.    Discharge Disposition   Patient left AGAINST MEDICAL ADVICE.    Hospital Course   Patient is a 38 y/o man who has anxiety, depression, obesity, hypertension, diabetes mellitus type II, gout and polysubstance abuse. Patient presented for polysubstance detox and was found to have mildly symptomatic Covid-19. This led to patient being admitted to the medical unit.      Per ED note, this encounter lists patient's name and birthdate incorrectly: Patient has another chart with MRN [id number], correct spelling of patient's name is Chu Pimentel and date of birth [birth year] Patient reportedly gave incorrect name and date of birth because he was paranoid about others using his records against him.     Previous notes had stated that patient had onset of Covid-19 symptoms on 09-Mar-2023 but patient has since reported that he had no Covid-19 symptoms prior to presentation. As patient was found to be positive for Covid-19 on 11-Mar-2023, day 1 of isolation would have been 12-Mar-2023.     Patient  noted some acute right foot pain that appears to be musculoskeletal, possibly a sprain.    #Mildly symptomatic COVID-19 infection with no evidence of superimposed bacterial infection  - Patient reported no Covid-19 symptoms prior to presentation - patient was found to be positive for Covid-19 on 11-Mar-2023.  - Patient on isolation precautions for now - day 1 of isolation was 12-Mar-2023 and today is day 8 of isolation.  - No indication for Covid-specific therapy at this point in time.  - Patient has been declining lab draws for lovenox monitoring. After discussion with patient, plan is for patient to remain on prophylactic lovenox until isolation period ends on 22-Mar-2023 with frequent ambulation for DVT prophylaxis after that.     #Polysubstance use disorder; urine toxicology only positive for amphetamines  - Patient reportedly uses methamphetamines, marijuana, alcohol and tobacco.   - Patient reportedly has been using about 1 gm methamphetamines over the past week by smoking and snorting.   - Patient reportedly last used marijuana and alcohol several days ago.    - Patient reportedly had used opioids in the past and had been on suboxone but last refill was a 7 day supply on 21-Feb-2023 by Dr. Christianne Pelletier at [facility] per PDMP (in pt's other chart).      #Paranoia, auditory and visual hallucinations, insomnia, MDD, CULLEN, history of ADHD   - Patient currently on risperidal and Seroquel  - Anticipate that patient will eventually go to inpatient psychiatric unit     #Mild trench foot  - Patient on topical antifungals  - Monitoring for symptoms     #Hypertension   - Patient had been on Norvasc 10 mg daily and lisinopril 30 mg daily but patient had not taken this for some time.  - Blood pressure now controlled on Norvasc 10 mg daily and lisinopril 40 mg daily  - Continuing to monitor     #Hypokalemia, corrected for now  - No active intervention indicated.     #Listed history of diabetes mellitus type II  - HbA1c 5.7% in  spite of patient not being on treatment  - After review of blood glucose trends, will stop accuchecks and stop insulin sliding scale for now  - Further monitoring of HbA1c and further management as outpatient.     #Hx gout  - Patient asymptomatic  - Monitoring for changes.      #Acute right foot pain, possible right ankle sprain  - Acetaminophen as needed.     #Nicotine dependence, cigarette smoking, ongoing  - Changed from 2 mg nicotine gum to 4 mg nicotine gum hourly as needed     #Insomnia  - Patient now on trazodone 50 mg nightly as needed for insomnia.     #DVT prophylaxis  - Role of lovenox in reducing DVT risk with COVID-19 infection was discussed with patient  - After discussion, patient still expressed desire for subcutaneous lovenox to be discontinued  - Patient expressed understanding of the risk of developing venous thromboemboli without pharmacological DVT prophylaxis.    #Other   - Patient is homeless and resides in a shelter      Consultations This Hospital Stay   PSYCHIATRY IP CONSULT  PSYCHIATRY IP CONSULT  PSYCHIATRY IP CONSULT  SOCIAL WORK IP CONSULT  PSYCHIATRY IP CONSULT    Code Status   Prior    Time Spent on this Encounter   IKen DO, personally saw the patient today and spent greater than 30 minutes discharging this patient.       Ken Bey DO, [facility]  [address] [place] [id number]  Phone: [phone]  Fax: [phone]  ______________________________________________________________________       Primary Care Physician   Physician No Ref-Primary    Discharge Orders   No discharge procedures on file.    Significant Results and Procedures   No results found for this or any previous visit.    Discharge Medications   Discharge Medication List as of 3/22/2023  1:26 PM      START taking these medications    Details   terbinafine (LAMISIL) 1 % external cream Apply topically 2 times dailyDisp-42 g, R-0Local Print           Allergies    Allergies   Allergen Reactions     Penicillins

## 2023-03-23 ENCOUNTER — PATIENT OUTREACH (OUTPATIENT)
Dept: CARE COORDINATION | Facility: CLINIC | Age: 38
End: 2023-03-23

## 2023-03-23 NOTE — PROGRESS NOTES
Connected Care Resource Center Contact  [facility]/Voicemail     Clinical Data: Transitional Care Management Outreach     Outreach attempted x 1.  No phone number on file to reach patient.     Plan:  [facility] will do no further outreaches at this time.       Jennifer Gonzalez RN  [facility] Resource [place], [facility]    *Connected Care Resource Team does NOT follow patient ongoing. Referrals are identified based on internal discharge reports and the outreach is to ensure patient has an understanding of their discharge instructions.

## 2023-03-23 NOTE — TELEPHONE ENCOUNTER
Per chart review for [facility] placement, pt discharged from 5 med [facility] on 3/22/23 @ 1:26PM    Pt removed from [facility] worklist and intake no longer seeking placement

## 2023-03-25 ENCOUNTER — HOSPITAL ENCOUNTER (OUTPATIENT)
Facility: CLINIC | Age: 38
Setting detail: OBSERVATION
Discharge: HOME | End: 2023-03-27
Attending: EMERGENCY MEDICINE | Admitting: EMERGENCY MEDICINE
Payer: MEDICAID

## 2023-03-25 DIAGNOSIS — F33.9 RECURRENT MAJOR DEPRESSIVE DISORDER, REMISSION STATUS UNSPECIFIED (H): ICD-10-CM

## 2023-03-25 DIAGNOSIS — F29 PSYCHOSIS, UNSPECIFIED PSYCHOSIS TYPE (H): ICD-10-CM

## 2023-03-25 DIAGNOSIS — F15.10 METHAMPHETAMINE ABUSE (H): ICD-10-CM

## 2023-03-25 DIAGNOSIS — F41.1 GENERALIZED ANXIETY DISORDER: ICD-10-CM

## 2023-03-25 PROCEDURE — 99285 EMERGENCY DEPT VISIT HI MDM: CPT | Performed by: EMERGENCY MEDICINE

## 2023-03-25 PROCEDURE — 99285 EMERGENCY DEPT VISIT HI MDM: CPT | Mod: 25 | Performed by: EMERGENCY MEDICINE

## 2023-03-25 ASSESSMENT — ACTIVITIES OF DAILY LIVING (ADL): ADLS_ACUITY_SCORE: 35

## 2023-03-26 PROCEDURE — 90791 PSYCH DIAGNOSTIC EVALUATION: CPT

## 2023-03-26 PROCEDURE — 250N000013 HC RX MED GY IP 250 OP 250 PS 637: Performed by: STUDENT IN AN ORGANIZED HEALTH CARE EDUCATION/TRAINING PROGRAM

## 2023-03-26 PROCEDURE — G0378 HOSPITAL OBSERVATION PER HR: HCPCS

## 2023-03-26 RX ORDER — HYDROXYZINE HYDROCHLORIDE 50 MG/1
50 TABLET, FILM COATED ORAL
Status: DISCONTINUED | OUTPATIENT
Start: 2023-03-26 | End: 2023-03-27 | Stop reason: HOSPADM

## 2023-03-26 RX ORDER — OLANZAPINE 5 MG/1
10 TABLET, ORALLY DISINTEGRATING ORAL 3 TIMES DAILY PRN
Status: DISCONTINUED | OUTPATIENT
Start: 2023-03-26 | End: 2023-03-27 | Stop reason: HOSPADM

## 2023-03-26 RX ORDER — OLANZAPINE 5 MG/1
5-10 TABLET, ORALLY DISINTEGRATING ORAL
Status: DISCONTINUED | OUTPATIENT
Start: 2023-03-26 | End: 2023-03-27 | Stop reason: HOSPADM

## 2023-03-26 RX ORDER — OLANZAPINE 5 MG/1
10 TABLET, ORALLY DISINTEGRATING ORAL ONCE
Status: COMPLETED | OUTPATIENT
Start: 2023-03-26 | End: 2023-03-26

## 2023-03-26 RX ADMIN — OLANZAPINE 10 MG: 5 TABLET, ORALLY DISINTEGRATING ORAL at 12:33

## 2023-03-26 ASSESSMENT — COLUMBIA-SUICIDE SEVERITY RATING SCALE - C-SSRS
SUICIDE, SINCE LAST CONTACT: NO
ATTEMPT SINCE LAST CONTACT: NO
1. IN THE PAST MONTH, HAVE YOU WISHED YOU WERE DEAD OR WISHED YOU COULD GO TO SLEEP AND NOT WAKE UP?: YES
6. HAVE YOU EVER DONE ANYTHING, STARTED TO DO ANYTHING, OR PREPARED TO DO ANYTHING TO END YOUR LIFE?: NO
TOTAL  NUMBER OF ABORTED OR SELF INTERRUPTED ATTEMPTS SINCE LAST CONTACT: NO
ATTEMPT LIFETIME: NO
1. HAVE YOU WISHED YOU WERE DEAD OR WISHED YOU COULD GO TO SLEEP AND NOT WAKE UP?: YES

## 2023-03-26 ASSESSMENT — ACTIVITIES OF DAILY LIVING (ADL)
ADLS_ACUITY_SCORE: 35

## 2023-03-26 NOTE — ED NOTES
Patient stated he did not know who his nurse is, the nurse reintroduced themselves for the 4th time. Patient stated his feet are rotting and falling off and asked what is staff going to do about it, give him a wheelchair he asked. Nurse gave him a urinal and told him to lay down if it was hurting him, that he did not need a wheelchair he needed to try to rest. He then asked if staff if they were sending him to another hospital the nurse told him again that the dec  would be speaking with him soon and the team would make a plan to give him the best possible care. The patient seemed pleased with this plan. Then got up and was walking around.

## 2023-03-26 NOTE — ED NOTES
RN went to talk with patient. Pt stated he is hearing all these different voices and he does not know what is going on right now. He said he has been up a couple of days. He speaks in a very fast pace. Tech and nurse encouraged him to change into the provided scrubs. Security called to preform a wanding search. MD currently speaking with the  patient.

## 2023-03-26 NOTE — ED PROVIDER NOTES
"      [facility] EMERGENCY DEPARTMENT ([facility])  March 25, 2023     History     Chief Complaint   Patient presents with     Hallucinations     HPI  Chu Pimentel is a 37 year old male with a past medical history significant for ADHD, CULLEN, MDD, polysubstance abuse (methamphetamines, marijuana, alcohol, tobacco), paranoid ideation, homelessness, trench foot, HTN who presents to the Emergency Department for evaluation of hallucinations, paranoia and meth use.    Per chart review, the patient was recently admitted 3/10/23 - 3/22/23 at [facility] after he presented for polysubstance detox and was found to have mildly symptomatic Covid-19. He was admitted to the medical unit with plan to transfer him to the psych unit after isolation, however, before he could be transferred he stated that he would find his own psych services and left the hospital AMA.     Patient now presents with multiple complaints including hearing voices and drug use. He states that since leaving the hospital a few days ago, he has been using meth. States that he has not been using meth daily. Last meth use was this morning. Denies alcohol or other drug use. States that for the past 4 days since leaving the hospital he has been unable to sleep and has been unsure where to seek shelter. He reports that when he uses drugs he cannot get shelter anywhere and cannot get rest. States that he has been walking around for days, that he has been wearing the same clothes since leaving the hospital, that his clothes are in poor condition, and that he has been hurting himself by constantly walking. When asked if he is suicidal, he states, \"maybe\".    The patient also reports that over these past few days he has been hearing voices which command him to \"go this way, go that way, get out, stand up\". States he has had these auditory hallucinations before and that these hallucinations have been ongoing for the past few months. He reports feeling like people want " "to hurt him and that people are panicking around him. States multiple times to MD \"I feel like you are rushing me out\" and appears paranoid about being asked questions about medical history or about what the scribe is typing. While being asked about his symptoms during presentation the patient suddenly states he heard a voice telling him to \"get the fuck out of here\". He feels that he needs help in-hospital, does admit to recently leaving the hospital before he could be admitted to the psych unit but declines to answer why he left AMA. He expresses that he does not feel safe or comfortable and describes the care here as \"dysfunctional\".    Patient states he has been through treatment in the past. Admits that he has not been taking his psychiatric medications for months. States he does not follow with a clinic or therapist. Denies recent fevers.      Past Medical History  Past Medical History:   Diagnosis Date     Anxiety      Depression      Drug abuse (H)      HTN (hypertension)      Hypertension      Obesity      Past Surgical History:   Procedure Laterality Date     CHOLECYSTECTOMY  2012     amLODIPine (NORVASC) 10 MG tablet  amphetamine-dextroamphetamine (ADDERALL) 10 MG tablet  amphetamine-dextroamphetamine (ADDERALL) 30 MG tablet  atomoxetine (STRATTERA) 100 MG capsule  atomoxetine (STRATTERA) 100 MG capsule  bacitracin 500 UNIT/GM external ointment  Blood Pressure Monitoring (BLOOD PRESSURE CUFF) MISC  buprenorphine-naloxone (SUBOXONE) 8-2 MG SUBL sublingual tablet  buPROPion (WELLBUTRIN SR) 100 MG 12 hr tablet  buPROPion (WELLBUTRIN XL) 150 MG 24 hr tablet  citalopram (CELEXA) 10 MG tablet  citalopram (CELEXA) 40 MG tablet  citalopram (CELEXA) 40 MG tablet  colchicine (COLCYRS) 0.6 MG tablet  colchicine (MITIGARE) 0.6 MG capsule  escitalopram (LEXAPRO) 20 MG tablet  escitalopram (LEXAPRO) 20 MG tablet  hydrOXYzine (ATARAX) 25 MG tablet  ibuprofen (ADVIL,MOTRIN) 200 MG tablet  lisinopril (ZESTRIL) 10 MG " "tablet  lisinopril (ZESTRIL) 30 MG tablet  metFORMIN (GLUCOPHAGE) 500 MG tablet  metFORMIN (GLUCOPHAGE) 500 MG tablet  naloxone (NARCAN) 4 MG/0.1ML nasal spray  NARCAN 4 MG/0.1ML nasal spray  OLANZapine (ZYPREXA) 5 MG tablet  terbinafine (LAMISIL) 1 % external cream      Allergies   Allergen Reactions     Penicillins Unknown     Penicillins      Family History  Family History   Problem Relation Age of Onset     Hypertension Mother      Hypertension Father      Social History   Social History     Tobacco Use     Smoking status: Every Day     Packs/day: 1.00     Types: Cigarettes     Smokeless tobacco: Never   Substance Use Topics     Alcohol use: Yes     Comment: a few times a month     Drug use: Yes     Frequency: 3.0 times per week     Types: Marijuana, Methamphetamines     Comment: Drug use: hx of meth use       Past medical history, past surgical history, medications, allergies, family history, and social history were reviewed with the patient. No additional pertinent items.      A medically appropriate review of systems was performed with pertinent positives and negatives noted in the HPI, and all other systems negative.    Physical Exam   BP: 137/84  Pulse: 86  Temp: 97.9  F (36.6  C)  Resp: 18  Height: 175.3 cm (5' 9.02\")  Weight: 117.9 kg (260 lb)  SpO2: 97 %  Physical Exam  Vitals and nursing note reviewed.   Constitutional:       General: He is not in acute distress.     Appearance: He is not diaphoretic.      Comments: Adult male, somewhat agitated, paranoid, pacing.  Appears to be attending to internal stimuli   HENT:      Head: Atraumatic.   Cardiovascular:      Rate and Rhythm: Tachycardia present.      Comments: Patient is not cooperative with physical exam  Pulmonary:      Effort: No respiratory distress.   Neurological:      General: No focal deficit present.   Psychiatric:         Judgment: Judgment is impulsive.      Comments: Some psychomotor agitation/pacing noted. Paranoid, appears to be " attending to internal stimuli. SI (unclear if active plan at present). +AH. Tangential, some flight of ideas noted. Not always tracking the conversation.            ED Course, Procedures, & Data     10:48 PM  The patient was seen and examined by Flavia Garcia MD in Room ED08.    Procedures         Mental Health Risk Assessment      PSS-3    Date and Time Over the past 2 weeks have you felt down, depressed, or hopeless? Over the past 2 weeks have you had thoughts of killing yourself? Have you ever attempted to kill yourself? When did this last happen? User   03/25/23 2218 yes yes no -- DLF      C-SSRS ([place])    Date and Time Q1 Wished to be Dead (Past Month) Q2 Suicidal Thoughts (Past Month) Q3 Suicidal Thought Method Q4 Suicidal Intent without Specific Plan Q5 Suicide Intent with Specific Plan Q6 Suicide Behavior (Lifetime) Within the Past 3 Months? RETIRED: Level of Risk per Screen Screening Not Complete User   03/25/23 2218 yes yes no no no yes -- -- -- DLF              Suicide assessment completed by mental health (D.E.C., LCSW, etc.)       No results found for any visits on 03/25/23.  Medications   hydrOXYzine (ATARAX) tablet 50 mg (has no administration in time range)     Labs Ordered and Resulted from Time of ED Arrival to Time of ED Departure - No data to display  No orders to display          Critical care was not performed.     Medical Decision Making  The patient's presentation was of high complexity (a chronic illness severe exacerbation, progression, or side effect of treatment).    The patient's evaluation involved:  review of external note(s) from 2 sources (see separate area of note for details)    The patient's management necessitated moderate risk (prescription drug management including medications given in the ED).      Assessment & Plan      This is a 37-year-old male with a history of methamphetamine abuse and psychosis who presents to the emergency department today complaining of auditory  "hallucinations.  Patient is somewhat difficult to interview, as he is somewhat agitated and paranoid.  He is obviously attending to internal stimuli and frequently during the interview is asking us \"do you hear that, they just said get the fuck out \".  Nobody has actually said that during the interview.  He is endorsing some SI, and states that he believes he has tried to kill himself by walking around and creating blisters on his feet.  Patient does appear to be really quite paranoid and is concerned that people are after him.  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  It does appear that the patient was recently admitted to the hospital on the medicine service at [facility] as he initially went in for drug use but was found to be positive for COVID, this was felt to be an asymptomatic COVID infection but he was kept in the hospital for his entire isolation.  He was discharged on 3/22/23 AGAINST MEDICAL ADVICE, unfortunately although he was advised to go to inpatient psychiatry he elected not to do so and immediately went out and used.  He is now coming in stating that he needs help.    Patient displayed significant paranoia, particularly regarding the scribe who was typing the note and the use of the computer.  He asked to see and was particularly concerned about what was being typed about him. Patient has expressed this concern in the past with prior evaluation and assessment.  For instance, during his hospitalization most recently on the medicine service, the patient was paranoid and initially gave an incorrect name and date of birth because he was concerned his records were being used against him.    We have ordered a remote DEC assessment.  I have also placed him on a health officer hold until this can be completed and a urine tox screen has been ordered.    Patient will be signed out to overnight physician to follow up on DEC assessment recommendations. Suspect hospitalization may well be required. " He is currently on a health officer hold while we are awaiting DEC evaluation.    This part of the medical record was transcribed by Odalys De La Rosa, Medical Scribe, from a dictation done by Flavia Garcia MD.     I have reviewed the nursing notes. I have reviewed the findings, diagnosis, plan and need for follow up with the patient.    New Prescriptions    No medications on file       Final diagnoses:   Psychosis, unspecified psychosis type (H)   Methamphetamine abuse (H)     I, Odalys De La Rosa, am serving as a trained medical scribe to document services personally performed by Flavia Garcia MD, based on the provider's statements to me.   I, Flavia Garcia MD, was physically present and have reviewed and verified the accuracy of this note documented by Odalys De La Rosa.    Flavia Garcia MD  [facility] EMERGENCY DEPARTMENT  3/25/2023     Flavia Garcia MD  03/26/23 0112

## 2023-03-26 NOTE — ED NOTES
"[facility] Crisis Reassessment    Writer was requested to meet with Chu Pimentel for reassessment at the request of DEC for the following reasons: Pt on observation status. Pt was first seen on 3/26/23 by Rajeev Kamara; see the initial assessment note for details.    10:50 - 11:05am  Writer attempted to meet with pt.  Pt was sleeping and opened his eyes a few times.  Pt stated \"I already answered these questions, why are you asking them again\".  Writer explained pt was on observation status and needed to complete reassessment.  Pt continued to close his eyes and was not responding to writer's questions.  Writer asked if pt was wanting IP and pt stated \"yes\".  Writer asked if pt was experiencing hallucinations, pt initially ignored writer, writer asked again and pt knocked over the IPad.  Writer updated the Roxanna VANG that pt had knocked over the IPad and extended care will try again later.      Maira FITZPATRICK         "

## 2023-03-26 NOTE — ED NOTES
"I took signout on the patient from Dr. Hills.  This is a 37 year old male with altered mental status - hallucinations - with concerns for psychiatric illness. Attempted DEC assessment overnight unsuccessfully. Patient is on a transport hold, plan is tentatively to repeat assessment at 0900.    12:28 PM I was recently informed that the patient through the iPad and refused to participate in the second attempt at a DEC assessment.  The patient tells me he continues to hear voices.  When asked what these voices are telling him to do or if he has any thoughts of hurting himself or others he says \"I do not know man, they tell me a lot of things.  \"He is unable to tell me if he has any psychiatric history.  When asked if he would like some medication that may help with the voices he says yes.  We will place the patient on a 72-hour hold as his transportation hold is  and order some p.o. Zydis.    3:50 PM Patient signed out to Dr. Bartlett.       Jaden Jaime MD  [phone]    "

## 2023-03-26 NOTE — CONSULTS
Diagnostic Evaluation Consultation  Crisis Assessment    Patient was assessed: Maggi  Patient location: [facility] ER  Was a release of information signed: Yes. Providers included on the release: outpatient service providers.      Referral Data and Chief Complaint  Chu Pimentel is a 37 year old, who uses he/him pronouns, and presents to the ED alone. Patient is referred to the ED by self. Patient is presenting to the ED for the following concerns: worsening of depression, anxiety, hallucinations and suicidal ideations.  Pt has a history of depression, anxiety, ADHD, psychosis and QUINCY.  Pt endorsed increased depression, and anxiety symptoms.  Pt reported having poor sleep and disrupted appetite.  Pt endorsed paranoia as he felt someone was watching him and ought to harm him.  Pt endorsed auditory and visual hallucinations.  However, Pt was not able to provide further details as he kept falling asleep during his assessment.  Pt currently denied having suicidal ideations, but reported having thoughts of suicide earlier today without specific plan.  Pt reported he has been using meth every other day and he last used meth earlier this morning.  Pt identified being homeless and worsening of hallucinations as triggers leading to his current mental health crisis.      Informed Consent and Assessment Methods     Patient is his own guardian. Writer met with patient and explained the crisis assessment process, including applicable information disclosures and limits to confidentiality, assessed understanding of the process, and obtained consent to proceed with the assessment. Patient was observed to be able to participate in the assessment as evidenced by calm, minimally engaged and cooperative.. Assessment methods included conducting a formal interview with patient, review of medical records, collaboration with medical staff, and obtaining relevant collateral information from family and community providers when  "available..     Over the course of this crisis assessment provided reassurance, offered validation, engaged patient in problem solving and disposition planning, assisted in processing patient's thoughts and feeling relating to mental health symptoms and suicidal ideations., provided psychoeducation and facilitated family communication. Patient's response to interventions was minimally receptive.     Summary of Patient Situation  Pt exchanged greetings and made minimal eye contact with this writer.  Pt was minimally engaged and cooperative in his DEC Assessment.  Pt was not alert and oriented as he kept falling asleep during his DEC Assessment.  Pt was a poor historian as he kept falling asleep.  Pt remarked, \"I am very tired, depressed, hearing voices and my body hurts, I need help and place to stay.\" as his reasons for visiting the ER today.  Per Epic note, \"Pt came in for hallucinations, stated that he has been up for a few days looking for a place to sleep, last used methamphetamine this morning, started hearing voices, telling him 'to get the fuck out\", he said he was recently here and was then transferred to [facility] and he would like to go back to get some help, he stated that he his thoughts are telling him to harm himself and others, he was speaking fast paced and unable to concentrate on what I was saying, he was paranoid at the amount of people around him and wanting to know what everyone was doing.\"    Brief Psychosocial History  Pt declined to share his family background.  Per previous DEC Assessment, \"Pt has two children that reside with their mother. He is not sure where they are and he misses them. Pt reports that he believes in God. Pt has resided in a shelter 1-2 years on and off.  He reports that he broke up with his girlfriend and then started residing in a shelter 3-6 months later.  Pt identifies as . He denies having a support system. He reports low motivation doing tasks and states that " "he usually \"lounges around\" and uses drugs during the day.\"    Significant Clinical History  Pt has a history of depression, anxiety, ADHD, psychosis and QUINCY.  Pt reported a history of multiple CD treatments and psychiatric hospitalizations.  Pt was not able to provide further details as he kept falling asleep.  Pt reported he has no current established outpatient mental health service providers.  Pt reported he has no prescribed psychiatric medications.  Pt reported he has no support system and no coping skills.     Collateral Information  Writer called and left a voice message to Pt's mom, Ninfa Pimentel [phone] but unable to contact.     Risk Assessment  Stone Suicide Severity Rating Scale Full Clinical Version:3/26/2023  Suicidal Ideation  1. Wish to be Dead (Lifetime): Yes  1. Wish to be Dead (Past 1 Month): Yes     Suicidal Behavior  Actual Attempt (Lifetime): No  Has subject engaged in non-suicidal self-injurious behavior? (Lifetime): No  C-SSRS Risk (Lifetime/Recent)  Calculated C-SSRS Risk Score (Lifetime/Recent): Low Risk    Stone Suicide Severity Rating Scale Since Last Contact: 3/11/2023  Suicidal Ideation  1. Wish to be Dead (Lifetime): Yes  1. Wish to be Dead (Past 1 Month): Yes  2. Non-Specific Active Suicidal Thoughts (Lifetime): Yes  2. Non-Specific Active Suicidal Thoughts (Past 1 Month): Yes  3. Active Suicidal Ideation with any Methods (Not Plan) Without Intent to Act (Lifetime): Yes  3. Active Suicidal Ideation with any Methods (Not Plan) Without Intent to Act (Past 1 Month): Yes  Active Suicidal Ideation with any Methods (Not Plan) Description (Past 1 Month):  (Patient has considered going outside to freeze to death)  4. Active Suicidal Ideation with Some Intent to Act, Without Specific Plan (Lifetime): No  4. Active Suicidal Ideation with Some Intent to Act, Without Specific Plan (Past 1 Month): No  5. Active Suicidal Ideation with Specific Plan and Intent (Lifetime): No  5. Active " "Suicidal Ideation with Specific Plan and Intent (Past 1 Month): No  Intensity of Ideation  Most Severe Ideation Rating (Lifetime):  (\"It was nothing at all\")  Most Severe Ideation Rating (Past 1 Month): 3  Frequency (Lifetime):  (\"it was nothing at all\")  Frequency (Past 1 Month): Many times each day  Duration (Past 1 Month): 4-8 hours/most of day  Controllability (Past 1 Month): Can control thoughts with little difficulty  Deterrents (Past 1 Month): Deterrents probably stopped you  Reasons for Ideation (Past 1 Month):  (\"I don't know why. I'm trying to figure it out.\")  Suicidal Behavior  Actual Attempt (Lifetime): No  Has subject engaged in non-suicidal self-injurious behavior? (Lifetime): Yes  Has subject engaged in non-suicidal self-injurious behavior? (Past 3 Months): Yes (sometimes bangs head to \"make the stuff stop\")  Interrupted Attempts (Lifetime): No  Aborted or Self-Interrupted Attempt (Lifetime): No  Preparatory Acts or Behavior (Lifetime): No  C-SSRS Risk (Lifetime/Recent)  Calculated C-SSRS Risk Score (Lifetime/Recent): Moderate Risk             Validity of evaluation is impacted by presenting factors during interview Pt was not alert, and oriented as he kept falling asleep during his DEC Assessment.  Pt was a poor historian due to psychosis.   Comments regarding subjective versus objective responses to [place] tool: N/A  Environmental or Psychosocial Events: legal issues such as DWI, DUI, lawsuit, CPS involvement, etc., loss of relationship due to divorce/separation, work or task failure, challenging interpersonal relationships, helplessness/hopelessness, impulsivity/recklessness, unemployment/underemployment, unstable housing, homelessness, excessive debt, poor finances, other life stressors, ongoing abuse of substances, neither working nor attending school and social isolation  Chronic Risk Factors: history of psychiatric hospitalization, chronic and ongoing sleep difficulties and serious, " persistent mental illness   Warning Signs: talking or writing about death, dying, or suicide, hopelessness, pain (new or worsening), feeling trapped, like there is no way out, increasing substance use or abuse, withdrawing from friends, family, and society, anxiety, agitation, unable to sleep, sleeping all the time, dramatic changes in mood, no reason for living, no sense of purpose in life, engaging in self-destructive behavior and recent discharges from emergency department or inpatient psychiatric care  Protective Factors: help seeking and constructive use of leisure time, enjoyable activities, resilience  Interpretation of Risk Scoring, Risk Mitigation Interventions and Safety Plan:  Low risk.  Pt currently denied having suicidal ideations, but reported having thoughts of suicide earlier today without plan.       Does the patient have thoughts of harming others? No     Is the patient engaging in sexually inappropriate behavior?  no        Current Substance Abuse     Is there recent substance abuse? Substance type(s): methamphetamine Frequency: once every other day. Quantity: unknown Method: unknown Duration: unknown Last use: earlier this morning.     Was a urine drug screen or blood alcohol level obtained: No       Mental Status Exam     Affect: Constricted   Appearance: Appropriate    Attention Span/Concentration: Inattentive  Eye Contact: Avoidant   Fund of Knowledge: Delayed    Language /Speech Content: Non-fluent   Language /Speech Volume: Normal    Language /Speech Rate/Productions: Minimally Responsive    Recent Memory: Variable   Remote Memory: Variable   Mood: Anxious, Apathetic, Depressed and Sad    Orientation to Person: No    Orientation to Place: No   Orientation to Time of Day: No    Orientation to Date: No    Situation (Do they understand why they are here?): Yes    Psychomotor Behavior: Normal and Underactive    Thought Content: Clear, Hallucinations, Paranoia and Suicidal   Thought Form: Intact       History of commitment: No       Medication    Psychotropic medications: No  Medication changes made in the last two weeks: No       Current Care Team    Primary Care Provider: No  Psychiatrist: No  Therapist: No  : No     CTSS or ARMHS: No  ACT Team: No  Other: No      Diagnosis    296.30 (F33.9) Major Depressive Disorder, Recurrent Episode, Unspecified _ and With mixed features   300.02 (F41.1) Generalized Anxiety Disorder - primary   Substance-Related & Addictive Disorders Stimulant Use Disorder:  In a controlled environment, Specify current severity:  Severe  304.40 (F15.20) Severe, Amphetamine type substance - primary     Clinical Summary and Substantiation of Recommendations    Pt presenting in the ER today due to worsening of depression, anxiety, hallucinations and suicidal ideations.  Pt has been homeless and reported using meth every other day.  Pt has a history of medication and treatment non-adherence.  Pt endorsed increased depression, and anxiety symptoms.  Pt reported having poor sleep and disrupted appetite.  Pt endorsed paranoia, auditory and visual hallucinations.  Pt currently denied having suicidal ideations, but reported having thoughts of suicide earlier today without specific plan.  Pt was not alert, oriented and a poor historian as he kept falling asleep during assessment.  Pt has a history of baseline psychosis and his current psychosis is likely exacerbated by his ongoing methamphetamine abuse.  Pt was appropriate for observation and DEC re-assessment once he was awake and alert.  Brando Hills MD reviewed and concurred with observation disposition and DEC re-assessment at 8am for possible referral to crisis bed service or CD Assessment/treatment.       Disposition    Recommended disposition: Residential Treatment: crisis bed service, Substance Abuse Disorder Treatment, Rule 25/QUINCY Assessment and Other: Observation status.       Reviewed case and recommendations with  attending provider. Attending Name: Brando Hills MD       Attending concurs with disposition: Yes       Patient and/or validated legal guardian concurs with disposition: Yes       Final disposition: Other: Observation status..       Assessment Details    Patient interview started at: 2:35am and completed at: 2:52am.     Total duration spent on the patient case in minutes: 2.0 hrs      CPT code(s) utilized: 91228 - Psychotherapy for Crisis - 60 (30-74*) min       CECILE Gomez, MA, LMFT, Psychotherapist  DEC - Triage & Transition Services  Callback: [phone]

## 2023-03-26 NOTE — ED NOTES
Patient stated he never saw the nurse or doctor before who were just in the room recently. The patient was agitated and did not want security to wand him. Security then calmed the patient and he allowed security to wand him and take off his hoodie but refused to change into psych scrubs.

## 2023-03-26 NOTE — CONSULTS
"[facility] Crisis Reassessment      hCu Pimentel was reassessed at the request of Jaden Jaime MD for the following reasons: hallucations. Pt was first seen on 3/26/2023 by Rajeev Kamara; see the initial assessment note for details.      Patient Presentation    Initial ED presentation details: Pt presenting in the ER today due to worsening of depression, anxiety, hallucinations and suicidal ideations.  Pt has been homeless and reported using meth every other day.  Pt has a history of medication and treatment non-adherence.  Pt endorsed increased depression, and anxiety symptoms.  Pt reported having poor sleep and disrupted appetite.  Pt endorsed paranoia, auditory and visual hallucinations.  Pt currently denied having suicidal ideations, but reported having thoughts of suicide earlier today without specific plan.  Pt was not alert, oriented and a poor historian as he kept falling asleep during assessment.  Pt has a history of baseline psychosis and his current psychosis is likely exacerbated by his ongoing methamphetamine abuse.  Pt was appropriate for observation and DEC re-assessment once he was awake and alert.  Brando Hills MD reviewed and concurred with observation disposition and DEC re-assessment at 8am for possible referral to crisis bed service or CD Assessment/treatment.       Current patient presentation: Patient reports that he feels like as though his rights have been violated and is \"just trying to get help\".  Patient reports that he continues to hear things reporting that he is not being treated well in the hospital and feels as though he cannot control the voices.  Patient denies wanting to hurt himself but feels that other people want to hurt him based upon the voices that he hears.  Patient feels that he is not safe reporting that he has \"nowhere to go\".  He shares that his feet are completely raw from walking barefoot.  Patient reports that he is open to a crisis bed and wishes to get " "into long-term treatment for substance use concerns.    Changes observed since initial assessment: Patient appears to be somewhat more oriented in the reassessment compared to the previous note during the initial assessment.  Patient continues to remain irritable and frustrated with having to meet with multiple assessors.  At times patient would raise his voice quite loudly and continue to ask the question of \"what do you want from me man?\"    Risk of Harm  Suicidal Ideation  1. Wish to be Dead (Lifetime): Yes  1. Wish to be Dead (Past 1 Month): Yes     Suicidal Behavior  Actual Attempt (Lifetime): No  Has subject engaged in non-suicidal self-injurious behavior? (Lifetime): No  C-SSRS Risk (Lifetime/Recent)  Calculated C-SSRS Risk Score (Lifetime/Recent): Low Risk    Bradley Suicide Severity Rating Scale Since Last Contact: Completed on 3/26/2023     Suicidal Behavior (Since Last Contact)  Actual Attempt (Since Last Contact): No  Has subject engaged in non-suicidal self-injurious behavior? (Since Last Contact): No  Aborted or Self-Interrupted Attempt (Since Last Contact): No  Preparatory Acts or Behavior (Since Last Contact): No  Suicide (Since Last Contact): No     C-SSRS Risk (Since Last Contact)  Calculated C-SSRS Risk Score (Since Last Contact): No Risk Indicated    Validity of evaluation is impacted by presenting factors during interview halluciations/voices.   Comments regarding subjective versus objective responses to [place] tool:   Environmental or Psychosocial Events: helplessness/hopelessness, impulsivity/recklessness and unstable housing, homelessness  Chronic Risk Factors: history of psychiatric hospitalization and serious, persistent mental illness   Warning Signs: increasing substance use or abuse, anxiety, agitation, unable to sleep, sleeping all the time, dramatic changes in mood and engaging in self-destructive behavior  Protective Factors: help seeking and sense of belonging  Interpretation of " Risk Scoring, Risk Mitigation Interventions and Safety Plan:  Pt denies any current SI or safety concerns. Pt notes that the voices are what make him feel unsafe as if others are trying to harm him.     Does the patient have thoughts of harming others? No    Mental Status Exam   Affect: Other: irritable   Appearance: Appropriate    Attention Span/Concentration: Inattentive?    Eye Contact: Engaged   Fund of Knowledge: Appropriate    Language /Speech Content: Fluent   Language /Speech Volume: Loud and Normal    Language /Speech Rate/Productions: Minimally Responsive and Normal    Recent Memory: Intact   Remote Memory: Intact   Mood: Irritable    Orientation to Person: Yes    Orientation to Place: Yes   Orientation to Time of Day: Yes    Orientation to Date: Yes    Situation (Do they understand why they are here?): Yes    Psychomotor Behavior: Agitated and Normal    Thought Content: Hallucinations   Thought Form: Intact       Additional Collateral Information   None      Therapeutic Intervention  The following therapeutic methodologies were employed when working with the patient: Establishing rapport, Active listening and Apply solution-focused therapy to address current crisis. Patient response to intervention: dismissive .    Diagnosis:      296.30 (F33.9) Major Depressive Disorder, Recurrent Episode, Unspecified _ and With mixed features   300.02 (F41.1) Generalized Anxiety Disorder - primary   Substance-Related & Addictive Disorders Stimulant Use Disorder:  In a controlled environment, Specify current severity:  Severe  304.40 (F15.20) Severe, Amphetamine type substance - primary     Clinical Substantiation of Recommendations  Patient's current goal at this point is to establish a long-term treatment for methamphetamine abuse and wishes to obtain long-term housing.  Patient continues to remain irritable in the emergency room and feeling quite frustrated with having to speak to multiple assessors about his current  status.  Patient continues to feel troubled by the ongoing auditory hallucinations and feels that he has limited control over them.  He denies any safety concerns however does admit to feeling unsafe based upon how often he hears the voices.    Discussed with Dr. Keating that there is currently no crisis beds available at this time outside of the potential homeless shelter later on the evening.  The patient was given the option of looking into a shelter or being transported to the [facility] for further observation and monitoring while following up with extended care for further crisis bed options. The patient is agreeable to further observation at the [facility] while pt continues to remain on a 72 hour hold. It would seem appropriate for pt to then discharge to a crisis bed while getting an assessment for CD treatment scheduled prior to discharge.     Plan:    Disposition  Recommended disposition: Rule 25/QUINCY Assessment  , observation    Reviewed case and recommendations with attending provider. Attending Name: Dr. Bartlett      Attending concurs with disposition: Yes      Patient and/or verified legal guardian concurs with disposition: Yes      Final disposition: Rule 25/QUINCY Assessment and Other: observation on the [facility].         Assessment Details  Total duration spent on the patient case in minutes: .75 hrs     CPT code(s) utilized: 84101 - Psychotherapy for Crisis - 60 (30-74*) min       Jonathan Leger, [facility]  Callback: [phone]    Pt became increasingly irritable when discussing the aftercare plan, so  made the determination to discontinue this to avoid pt increasing in agitation.

## 2023-03-26 NOTE — ED TRIAGE NOTES
"Pt came in for hallucinations, stated that he has been up for a few days looking for a place to sleep, last used methamphetamine this morning, started hearing voices, telling him 'to get the fuck out\", he said he was recently here and was then transferred to [facility] and he would like to go back to get some help, he stated that he his thoughts are telling him to harm himself and others, he was speaking fast paced and unable to concentrate on what I was saying, he was paranoid at the amount of people around him and wanting to know what everyone was doing.     Triage Assessment     Row Name 03/25/23 [id number]       Triage Assessment (Adult)    Airway WDL WDL       Respiratory WDL    Respiratory WDL WDL       Skin Circulation/Temperature WDL    Skin Circulation/Temperature WDL WDL       Cardiac WDL    Cardiac WDL WDL       Peripheral/Neurovascular WDL    Peripheral Neurovascular WDL WDL       Cognitive/Neuro/Behavioral WDL    Cognitive/Neuro/Behavioral WDL WDL              "

## 2023-03-27 VITALS
HEART RATE: 103 BPM | SYSTOLIC BLOOD PRESSURE: 144 MMHG | BODY MASS INDEX: 38.51 KG/M2 | RESPIRATION RATE: 18 BRPM | TEMPERATURE: 98 F | WEIGHT: 260 LBS | OXYGEN SATURATION: 98 % | HEIGHT: 69 IN | DIASTOLIC BLOOD PRESSURE: 84 MMHG

## 2023-03-27 LAB
AMPHETAMINES UR QL SCN: ABNORMAL
BARBITURATES UR QL SCN: ABNORMAL
BENZODIAZ UR QL SCN: ABNORMAL
BZE UR QL SCN: ABNORMAL
CANNABINOIDS UR QL SCN: ABNORMAL
FENTANYL UR QL: NORMAL
OPIATES UR QL SCN: ABNORMAL

## 2023-03-27 PROCEDURE — 80307 DRUG TEST PRSMV CHEM ANLYZR: CPT | Performed by: STUDENT IN AN ORGANIZED HEALTH CARE EDUCATION/TRAINING PROGRAM

## 2023-03-27 PROCEDURE — G0378 HOSPITAL OBSERVATION PER HR: HCPCS

## 2023-03-27 PROCEDURE — 99222 1ST HOSP IP/OBS MODERATE 55: CPT | Performed by: PSYCHIATRY & NEUROLOGY

## 2023-03-27 PROCEDURE — 80307 DRUG TEST PRSMV CHEM ANLYZR: CPT | Performed by: EMERGENCY MEDICINE

## 2023-03-27 ASSESSMENT — ACTIVITIES OF DAILY LIVING (ADL)
ADLS_ACUITY_SCORE: 35

## 2023-03-27 NOTE — ED NOTES
Psychiatry evaluated the patient and wishes to send the patient home.  Patient will be discharged at this time.      Maicol Gonzalez MD, Maicol Horvath MD  03/27/23 1847

## 2023-03-27 NOTE — PLAN OF CARE
Chu Pimentel  March 26, 2023  Plan of Care Hand-off Note     Patient Care Path: Observation    Plan for Care:     Patient's current goal at this point is to establish a long-term treatment for methamphetamine abuse and wishes to obtain long-term housing.  Patient continues to remain irritable in the emergency room and feeling quite frustrated with having to speak to multiple assessors about his current status.  Patient continues to feel troubled by the ongoing auditory hallucinations and feels that he has limited control over them.  He denies any safety concerns however does admit to feeling unsafe based upon how often he hears the voices.    Discussed with Dr. Keating that there is currently no crisis beds available at this time outside of the potential homeless shelter later on the evening.  The patient was given the option of looking into a shelter or being transported to the [facility] for further observation and monitoring while following up with extended care for further crisis bed options. The patient is agreeable to further observation at the [facility] while pt continues to remain on a 72 hour hold. It would seem appropriate for pt to then discharge to a crisis bed while getting an assessment for CD treatment scheduled prior to discharge.     Critical Safety Issues: ongoing concerns over pt hearing voices    Overview:  This patient is a child/adolescent: No    This patient has additional special visitor precautions: No    Legal Status: 74 HH expiring on 3/28/2023    Contacts:   Axel Sheehany, Father: Contact [phone]  Ninfa Pimentel, Mother: Contact [phone]    Psychiatry Consult:  Adult Psychiatry Consult requested related to hallucinations/hearing voices. Psychiatry IP Consult Order Placed: Yes    Updated Attending Provider regarding plan of care.    Jonathan Leger

## 2023-03-27 NOTE — ED PROVIDER NOTES
"     Emergency Department Psychiatric Patient Sign-out       Brief HPI:  This is a 37 year old male signed out to me by previous provider.  See initial ED Provider note for details of the presentation.     Patient is medically cleared for admission to a Behavioral Health unit.      Pending studies include N/A.      The patient is on a hold.  The type of hold is 72 hours.      The patient has not required medication for agitation.    Medications   hydrOXYzine (ATARAX) tablet 50 mg (has no administration in time range)   OLANZapine zydis (zyPREXA) ODT tab 5-10 mg (has no administration in time range)   OLANZapine zydis (zyPREXA) ODT tab 10 mg (10 mg Oral $Given 3/26/23 1233)       Exam:   Patient Vitals for the past 24 hrs:   BP Temp Temp src Pulse Resp SpO2 Height Weight   03/26/23 0000 (!) 144/84 98  F (36.7  C) Oral 103 18 98 % -- --   03/25/23 2216 137/84 97.9  F (36.6  C) Oral 86 18 97 % 1.753 m (5' 9.02\") 117.9 kg (260 lb)         ED Course:    Significant events under my care included: Patient able to complete his DEC assessment during this shift, please refer to their note for further details.  Plan to transfer to the [facility] under observation status with psych consult to be obtained in the morning and extended care evaluation to assess for possible crisis residence.    Patient was signed out to the oncoming provider.      Impression:    ICD-10-CM    1. Psychosis, unspecified psychosis type (H)  F29       2. Methamphetamine abuse (H)  F15.10           Plan:    1. Await Transfer to [facility] ED as a mental health order for extended care and psych consult.      RESULTS:   Results for orders placed or performed during the hospital encounter of 03/25/23 (from the past 24 hour(s))   Diagnostic Evaluation Center (DEC) Assessment Consult Order:     Status: None ()    Collection Time: 03/25/23 11:03 PM    Narrative    Rajeev Kamara, [facility]     3/26/2023  4:39 AM  Diagnostic Evaluation Consultation  Crisis " Assessment    Patient was assessed: Maggi  Patient location: [facility] ER  Was a release of information signed: Yes. Providers included on   the release: outpatient service providers.      Referral Data and Chief Complaint  Chu Pimentel is a 37 year old, who uses he/him pronouns, and   presents to the ED alone. Patient is referred to the ED by self.   Patient is presenting to the ED for the following concerns:   worsening of depression, anxiety, hallucinations and suicidal   ideations.  Pt has a history of depression, anxiety, ADHD,   psychosis and QUINCY.  Pt endorsed increased depression, and anxiety   symptoms.  Pt reported having poor sleep and disrupted appetite.    Pt endorsed paranoia as he felt someone was watching him and   ought to harm him.  Pt endorsed auditory and visual   hallucinations.  However, Pt was not able to provide further   details as he kept falling asleep during his assessment.  Pt   currently denied having suicidal ideations, but reported having   thoughts of suicide earlier today without specific plan.  Pt   reported he has been using meth every other day and he last used   meth earlier this morning.  Pt identified being homeless and   worsening of hallucinations as triggers leading to his current   mental health crisis.      Informed Consent and Assessment Methods     Patient is his own guardian. Writer met with patient and   explained the crisis assessment process, including applicable   information disclosures and limits to confidentiality, assessed   understanding of the process, and obtained consent to proceed   with the assessment. Patient was observed to be able to   participate in the assessment as evidenced by calm, minimally   engaged and cooperative.. Assessment methods included conducting   a formal interview with patient, review of medical records,   collaboration with medical staff, and obtaining relevant   collateral information from family and community providers when  "  available..     Over the course of this crisis assessment provided reassurance,   offered validation, engaged patient in problem solving and   disposition planning, assisted in processing patient's thoughts   and feeling relating to mental health symptoms and suicidal   ideations., provided psychoeducation and facilitated family   communication. Patient's response to interventions was minimally   receptive.     Summary of Patient Situation  Pt exchanged greetings and made minimal eye contact with this   writer.  Pt was minimally engaged and cooperative in his DEC   Assessment.  Pt was not alert and oriented as he kept falling   asleep during his DEC Assessment.  Pt was a poor historian as he   kept falling asleep.  Pt remarked, \"I am very tired, depressed,   hearing voices and my body hurts, I need help and place to stay.\"   as his reasons for visiting the ER today.  Per Epic note, \"Pt   came in for hallucinations, stated that he has been up for a few   days looking for a place to sleep, last used methamphetamine this   morning, started hearing voices, telling him 'to get the fuck   out\", he said he was recently here and was then transferred to   [facility] and he would like to go back to get some help, he stated   that he his thoughts are telling him to harm himself and others,   he was speaking fast paced and unable to concentrate on what I   was saying, he was paranoid at the amount of people around him   and wanting to know what everyone was doing.\"    Brief Psychosocial History  Pt declined to share his family background.  Per previous DEC   Assessment, \"Pt has two children that reside with their mother.   He is not sure where they are and he misses them. Pt reports that   he believes in God. Pt has resided in a shelter 1-2 years on and   off.  He reports that he broke up with his girlfriend and then   started residing in a shelter 3-6 months later.  Pt identifies as   . He denies having a support " "system. He reports low   motivation doing tasks and states that he usually \"lounges   around\" and uses drugs during the day.\"    Significant Clinical History  Pt has a history of depression, anxiety, ADHD, psychosis and QUINCY.    Pt reported a history of multiple CD treatments and psychiatric   hospitalizations.  Pt was not able to provide further details as   he kept falling asleep.  Pt reported he has no current   established outpatient mental health service providers.  Pt   reported he has no prescribed psychiatric medications.  Pt   reported he has no support system and no coping skills.     Collateral Information  Writer called and left a voice message to Pt's mom, Ninfa Pimentel   [phone] but unable to contact.     Risk Assessment  Johnson Suicide Severity Rating Scale Full Clinical   Version:3/26/2023  Suicidal Ideation  1. Wish to be Dead (Lifetime): Yes  1. Wish to be Dead (Past 1 Month): Yes     Suicidal Behavior  Actual Attempt (Lifetime): No  Has subject engaged in non-suicidal self-injurious behavior?   (Lifetime): No  C-SSRS Risk (Lifetime/Recent)  Calculated C-SSRS Risk Score (Lifetime/Recent): Low Risk    Johnson Suicide Severity Rating Scale Since Last Contact:   3/11/2023  Suicidal Ideation  1. Wish to be Dead (Lifetime): Yes  1. Wish to be Dead (Past 1 Month): Yes  2. Non-Specific Active Suicidal Thoughts (Lifetime): Yes  2. Non-Specific Active Suicidal Thoughts (Past 1 Month): Yes  3. Active Suicidal Ideation with any Methods (Not Plan) Without   Intent to Act (Lifetime): Yes  3. Active Suicidal Ideation with any Methods (Not Plan) Without   Intent to Act (Past 1 Month): Yes  Active Suicidal Ideation with any Methods (Not Plan) Description   (Past 1 Month):  (Patient has considered going outside to freeze   to death)  4. Active Suicidal Ideation with Some Intent to Act, Without   Specific Plan (Lifetime): No  4. Active Suicidal Ideation with Some Intent to Act, Without   Specific Plan (Past 1 " "Month): No  5. Active Suicidal Ideation with Specific Plan and Intent   (Lifetime): No  5. Active Suicidal Ideation with Specific Plan and Intent (Past 1   Month): No  Intensity of Ideation  Most Severe Ideation Rating (Lifetime):  (\"It was nothing at   all\")  Most Severe Ideation Rating (Past 1 Month): 3  Frequency (Lifetime):  (\"it was nothing at all\")  Frequency (Past 1 Month): Many times each day  Duration (Past 1 Month): 4-8 hours/most of day  Controllability (Past 1 Month): Can control thoughts with little   difficulty  Deterrents (Past 1 Month): Deterrents probably stopped you  Reasons for Ideation (Past 1 Month):  (\"I don't know why. I'm   trying to figure it out.\")  Suicidal Behavior  Actual Attempt (Lifetime): No  Has subject engaged in non-suicidal self-injurious behavior?   (Lifetime): Yes  Has subject engaged in non-suicidal self-injurious behavior?   (Past 3 Months): Yes (sometimes bangs head to \"make the stuff   stop\")  Interrupted Attempts (Lifetime): No  Aborted or Self-Interrupted Attempt (Lifetime): No  Preparatory Acts or Behavior (Lifetime): No  C-SSRS Risk (Lifetime/Recent)  Calculated C-SSRS Risk Score (Lifetime/Recent): Moderate Risk             Validity of evaluation is impacted by presenting factors during   interview Pt was not alert, and oriented as he kept falling   asleep during his DEC Assessment.  Pt was a poor historian due to   psychosis.   Comments regarding subjective versus objective responses to   Cory tool: N/A  Environmental or Psychosocial Events: legal issues such as DWI,   DUI, lawsuit, CPS involvement, etc., loss of relationship due to   divorce/separation, work or task failure, challenging   interpersonal relationships, helplessness/hopelessness,   impulsivity/recklessness, unemployment/underemployment, unstable   housing, homelessness, excessive debt, poor finances, other life   stressors, ongoing abuse of substances, neither working nor   attending school and social " isolation  Chronic Risk Factors: history of psychiatric hospitalization,   chronic and ongoing sleep difficulties and serious, persistent   mental illness   Warning Signs: talking or writing about death, dying, or suicide,   hopelessness, pain (new or worsening), feeling trapped, like   there is no way out, increasing substance use or abuse,   withdrawing from friends, family, and society, anxiety,   agitation, unable to sleep, sleeping all the time, dramatic   changes in mood, no reason for living, no sense of purpose in   life, engaging in self-destructive behavior and recent discharges   from emergency department or inpatient psychiatric care  Protective Factors: help seeking and constructive use of leisure   time, enjoyable activities, resilience  Interpretation of Risk Scoring, Risk Mitigation Interventions and   Safety Plan:  Low risk.  Pt currently denied having suicidal ideations, but   reported having thoughts of suicide earlier today without plan.       Does the patient have thoughts of harming others? No     Is the patient engaging in sexually inappropriate behavior?  no        Current Substance Abuse     Is there recent substance abuse? Substance type(s):   methamphetamine Frequency: once every other day. Quantity:   unknown Method: unknown Duration: unknown Last use: earlier this   morning.     Was a urine drug screen or blood alcohol level obtained: No       Mental Status Exam     Affect: Constricted   Appearance: Appropriate    Attention Span/Concentration: Inattentive  Eye Contact: Avoidant   Fund of Knowledge: Delayed    Language /Speech Content: Non-fluent   Language /Speech Volume: Normal    Language /Speech Rate/Productions: Minimally Responsive    Recent Memory: Variable   Remote Memory: Variable   Mood: Anxious, Apathetic, Depressed and Sad    Orientation to Person: No    Orientation to Place: No   Orientation to Time of Day: No    Orientation to Date: No    Situation (Do they understand why  they are here?): Yes    Psychomotor Behavior: Normal and Underactive    Thought Content: Clear, Hallucinations, Paranoia and Suicidal   Thought Form: Intact      History of commitment: No       Medication    Psychotropic medications: No  Medication changes made in the last two weeks: No       Current Care Team    Primary Care Provider: No  Psychiatrist: No  Therapist: No  : No     CTSS or ARMHS: No  ACT Team: No  Other: No      Diagnosis    296.30 (F33.9) Major Depressive Disorder, Recurrent Episode,   Unspecified _ and With mixed features   300.02 (F41.1) Generalized Anxiety Disorder - primary   Substance-Related & Addictive Disorders Stimulant Use Disorder:    In a controlled environment, Specify current severity:  Severe    304.40 (F15.20) Severe, Amphetamine type substance - primary     Clinical Summary and Substantiation of Recommendations    Pt presenting in the ER today due to worsening of depression,   anxiety, hallucinations and suicidal ideations.  Pt has been   homeless and reported using meth every other day.  Pt has a   history of medication and treatment non-adherence.  Pt endorsed   increased depression, and anxiety symptoms.  Pt reported having   poor sleep and disrupted appetite.  Pt endorsed paranoia,   auditory and visual hallucinations.  Pt currently denied having   suicidal ideations, but reported having thoughts of suicide   earlier today without specific plan.  Pt was not alert, oriented   and a poor historian as he kept falling asleep during assessment.    Pt has a history of baseline psychosis and his current psychosis   is likely exacerbated by his ongoing methamphetamine abuse.  Pt   was appropriate for observation and DEC re-assessment once he was   awake and alert.  Brando Hills MD reviewed and concurred   with observation disposition and DEC re-assessment at 8am for   possible referral to crisis bed service or CD   Assessment/treatment.        Disposition    Recommended disposition: Residential Treatment: crisis bed   service, Substance Abuse Disorder Treatment, Rule 25/QUINCY   Assessment and Other: Observation status.       Reviewed case and recommendations with attending provider.   Attending Name: Brando Hills MD       Attending concurs with disposition: Yes       Patient and/or validated legal guardian concurs with disposition:   Yes       Final disposition: Other: Observation status..       Assessment Details    Patient interview started at: 2:35am and completed at: 2:52am.     Total duration spent on the patient case in minutes: 2.0 hrs      CPT code(s) utilized: 86713 - Psychotherapy for Crisis - 60   (30-74*) min       Rajeev Kamara, [facility] [place], [place], LMFT, Psychotherapist  DEC - Triage & Transition Services  Callback: [phone]             Diagnostic Evaluation Center (DEC) Assessment Consult Order:     Status: None ()    Collection Time: 03/26/23  4:04 PM    Narrative    [place] Jonathanalex Henao     3/26/2023  7:12 PM  [facility] Crisis Reassessment      Chu Pimentel was reassessed at the request of Jaden Jaime MD for the following reasons: hallucations. Pt was first seen on   3/26/2023 by Rajeev Kamara; see the initial assessment note   for details.      Patient Presentation    Initial ED presentation details: Pt presenting in the ER today   due to worsening of depression, anxiety, hallucinations and   suicidal ideations.  Pt has been homeless and reported using meth   every other day.  Pt has a history of medication and treatment   non-adherence.  Pt endorsed increased depression, and anxiety   symptoms.  Pt reported having poor sleep and disrupted appetite.    Pt endorsed paranoia, auditory and visual hallucinations.  Pt   currently denied having suicidal ideations, but reported having   thoughts of suicide earlier today without specific plan.  Pt was   not alert, oriented and a poor historian as he kept falling   asleep during  "assessment.  Pt has a history of baseline psychosis   and his current psychosis is likely exacerbated by his ongoing   methamphetamine abuse.  Pt was appropriate for observation and   DEC re-assessment once he was awake and alert.  Brando Hills MD reviewed and concurred with observation disposition and   DEC re-assessment at 8am for possible referral to crisis bed   service or CD Assessment/treatment.       Current patient presentation: Patient reports that he feels like   as though his rights have been violated and is \"just trying to   get help\".  Patient reports that he continues to hear things   reporting that he is not being treated well in the hospital and   feels as though he cannot control the voices.  Patient denies   wanting to hurt himself but feels that other people want to hurt   him based upon the voices that he hears.  Patient feels that he   is not safe reporting that he has \"nowhere to go\".  He shares   that his feet are completely raw from walking barefoot.  Patient   reports that he is open to a crisis bed and wishes to get into   long-term treatment for substance use concerns.    Changes observed since initial assessment: Patient appears to be   somewhat more oriented in the reassessment compared to the   previous note during the initial assessment.  Patient continues   to remain irritable and frustrated with having to meet with   multiple assessors.  At times patient would raise his voice quite   loudly and continue to ask the question of \"what do you want from   me man?\"    Risk of Harm  Suicidal Ideation  1. Wish to be Dead (Lifetime): Yes  1. Wish to be Dead (Past 1 Month): Yes     Suicidal Behavior  Actual Attempt (Lifetime): No  Has subject engaged in non-suicidal self-injurious behavior?   (Lifetime): No  C-SSRS Risk (Lifetime/Recent)  Calculated C-SSRS Risk Score (Lifetime/Recent): Low Risk    Beaver Suicide Severity Rating Scale Since Last Contact:   Completed on 3/26/2023   "   Suicidal Behavior (Since Last Contact)  Actual Attempt (Since Last Contact): No  Has subject engaged in non-suicidal self-injurious behavior?   (Since Last Contact): No  Aborted or Self-Interrupted Attempt (Since Last Contact): No  Preparatory Acts or Behavior (Since Last Contact): No  Suicide (Since Last Contact): No     C-SSRS Risk (Since Last Contact)  Calculated C-SSRS Risk Score (Since Last Contact): No Risk   Indicated    Validity of evaluation is impacted by presenting factors during   interview halluciations/voices.   Comments regarding subjective versus objective responses to   [place] tool:   Environmental or Psychosocial Events: helplessness/hopelessness,   impulsivity/recklessness and unstable housing, homelessness  Chronic Risk Factors: history of psychiatric hospitalization and   serious, persistent mental illness   Warning Signs: increasing substance use or abuse, anxiety,   agitation, unable to sleep, sleeping all the time, dramatic   changes in mood and engaging in self-destructive behavior  Protective Factors: help seeking and sense of belonging  Interpretation of Risk Scoring, Risk Mitigation Interventions and   Safety Plan:  Pt denies any current SI or safety concerns. Pt notes that the   voices are what make him feel unsafe as if others are trying to   harm him.     Does the patient have thoughts of harming others? No    Mental Status Exam   Affect: Other: irritable   Appearance: Appropriate    Attention Span/Concentration: Inattentive?    Eye Contact: Engaged   Fund of Knowledge: Appropriate    Language /Speech Content: Fluent   Language /Speech Volume: Loud and Normal    Language /Speech Rate/Productions: Minimally Responsive and   Normal    Recent Memory: Intact   Remote Memory: Intact   Mood: Irritable    Orientation to Person: Yes    Orientation to Place: Yes   Orientation to Time of Day: Yes    Orientation to Date: Yes    Situation (Do they understand why they are here?): Yes     Psychomotor Behavior: Agitated and Normal    Thought Content: Hallucinations   Thought Form: Intact       Additional Collateral Information   None      Therapeutic Intervention  The following therapeutic methodologies were employed when   working with the patient: Establishing rapport, Active listening   and Apply solution-focused therapy to address current crisis.   Patient response to intervention: dismissive .    Diagnosis:      296.30 (F33.9) Major Depressive Disorder, Recurrent Episode,   Unspecified _ and With mixed features   300.02 (F41.1) Generalized Anxiety Disorder - primary   Substance-Related & Addictive Disorders Stimulant Use Disorder:    In a controlled environment, Specify current severity:  Severe    304.40 (F15.20) Severe, Amphetamine type substance - primary     Clinical Substantiation of Recommendations  Patient's current goal at this point is to establish a long-term   treatment for methamphetamine abuse and wishes to obtain   long-term housing.  Patient continues to remain irritable in the   emergency room and feeling quite frustrated with having to speak   to multiple assessors about his current status.  Patient   continues to feel troubled by the ongoing auditory hallucinations   and feels that he has limited control over them.  He denies any   safety concerns however does admit to feeling unsafe based upon   how often he hears the voices.    Discussed with Dr. Keating that there is currently no crisis beds   available at this time outside of the potential homeless shelter   later on the evening.  The patient was given the option of   looking into a shelter or being transported to the [facility] for   further observation and monitoring while following up with   extended care for further crisis bed options. The patient is   agreeable to further observation at the [facility] while pt   continues to remain on a 72 hour hold. It would seem appropriate   for pt to then discharge to a crisis bed  while getting an   assessment for CD treatment scheduled prior to discharge.     Plan:    Disposition  Recommended disposition: Rule 25/QUINCY Assessment  , observation    Reviewed case and recommendations with attending provider.   Attending Name: Dr. Bartlett      Attending concurs with disposition: Yes      Patient and/or verified legal guardian concurs with disposition:   Yes      Final disposition: Rule 25/QUINCY Assessment and Other: observation   on the [facility].         Assessment Details  Total duration spent on the patient case in minutes: .75 hrs     CPT code(s) utilized: 72672 - Psychotherapy for Crisis - 60   (30-74*) min       Jonathan Leger [facility]  Callback: [phone]    Pt became increasingly irritable when discussing the aftercare   plan, so  made the determination to discontinue this to   avoid pt increasing in agitation.            MD Dhruv Hassan, Giovanni HUTCHINSON MD  03/26/23 7892

## 2023-03-27 NOTE — DISCHARGE INSTRUCTIONS
May go home today    Please make an appointment to follow up with your mental health clinic and Your Primary Care Provider as needed.    Further resources for you include:  Your Medical Insurance Company can provide referrals to Mental Health Clinics in your provider network that can be accessed for outpatient evaluations and follow up.  Please call your Medical Insurance Company for a list of network care providers.    The following list of Mental Health Clinics may also be accessed for your care needs.  Many of these clinics offer a sliding fee option for patients that qualify.  Please call each clinic directly as services, hours, fees and policies vary greatly.    [facility] OUTPATIENT MENTAL HEALTH CLINICS - [phone]   [facility] - [address], [facility] - [address], 4th Floor, [facility] - [phone]   [facility] - [address], [facility] - [phone]   [facility] - [address] [facility] - [phone]   [facility] - [address], [facility] - [address] [facility], [address], [facility] - [address], [place]    F[facility]ILY AND CHILDREN'S SERVICES - [phone]   [facility] - [address] [facility] - [address], [facility], [address], [address], [facility] - [address], [facility].    CLUES ([place] SPEAKING)   [facility] - [address] [facility], [phone]   [place] - [address] [facility], [phone]    [facility] - [phone]   [facility] - [address], [address], [facility] - [address] [facility], [facility] EMOTIONAL HEALTH SERVICES (STOREFRONT/PYRAMID)   [facility] - [address], [place] [phone]   [facility] - [address], [place]  [phone]    [place] [facility] - [phone]   [place] - [address] [facility] CHILD & FAMILY DEVELOPMENT - [phone]   [place] [address] [facility] - [phone]   [facility] [address], [address], [place]

## 2023-03-27 NOTE — ED NOTES
Patient up to the bathroom twice this morning. Follows instructions. Sleeping most of the time thus far. Has not expressed interest in food or fluids. Easily falls back to sleep between interactions.

## 2023-03-27 NOTE — ED NOTES
Per psychiatry, the patient became irritable and very loud during his assessment with psych. Discharge orders placed. Patient became irritable initially with staff when discharge orders presented, and refused instructions. Moments later he asked for clothing and his possessions and got dressed and left. MD notified of all events.

## 2023-03-27 NOTE — ED NOTES
Gave pt his rights handout on the 72 hour hold. He inquired about going to [employer], nurse told him that the doctor is working on it for him and the nurse would up date him with any information. The patient thanked the nurse and went back to sleep.

## 2023-03-27 NOTE — CONSULTS
"          Psychiatry Consultation; Follow up              Reason for Consult, requesting source:    Psychosis. He was seen several times by psychiatry while at [facility]; most recently by Wanda WEBBER 3/22.  Requesting source:     Labs and imaging reviewed, discussed with nursing an ED MD.     Total time spent in chart review, patient interview and coordination of care; 55 min                 Interim history:    From ED note 3/25:   \"Chu Pimentel is a 37 year old male with a past medical history significant for ADHD, CULLEN, MDD, polysubstance abuse (methamphetamines, marijuana, alcohol, tobacco), paranoid ideation, homelessness, trench foot, HTN who presents to the Emergency Department for evaluation of hallucinations, paranoia and meth use.    Per chart review, the patient was recently admitted 3/10/23 - 3/22/23 at [facility] after he presented for polysubstance detox and was found to have mildly symptomatic Covid-19. He was admitted to the medical unit with plan to transfer him to the psych unit after isolation, however, before he could be transferred he stated that he would find his own psych services and left the hospital AMA.\"    He says that he is troubled by voices, but is vague about what they may be saying, but denies command hallucinations. He is also feeling somewhat depressed but not hopeless or suicidal. Several minutes into the interview he suddenly yelled very loudly. When I asked him not to do that again he did yell another time. He also appeared quite threatening so I concluded the interview.         Current Medications:      He received 10 mg PO Zyprexa yesterday   At [facility] he was receiving 2 mg Risperdal BID and 50 mg Seroquel HS           MSE:   Appearance: awake, alert and adequately groomed  Attitude:  uncooperative  Eye Contact:  fair  Mood:  angry  Affect:  : intensity is dramatic  Speech:  clear, coherent  Psychomotor Behavior:  intact station, gait and muscle tone  Thought Process:  " "tangental  Associations:  loosening of associations present  Thought Content:  auditory hallucinations present  Insight:  limited  Judgement:  poor   Unable to assess orientation, attention span or memory (he terminated the interview)      Vital signs:                   Height: 175.3 cm (5' 9.02\") Weight: 117.9 kg (260 lb)  Estimated body mass index is 38.38 kg/m  as calculated from the following:    Height as of this encounter: 1.753 m (5' 9.02\").    Weight as of this encounter: 117.9 kg (260 lb).           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 I am concerned about his potential for violence. He does not in my opinion represent a risk to himself.           Summary of Recommendations:   I would drop 72 hour hold  You can offer him a RX for Risperdal to take 2 mg BID     Axel Lopez M.D.   Consult liaison psychiatry \  [facility]   Contact information available via [facility] Paging/Directory      \"Much or all of the text in this note was generated through the use of Dragon Dictate voice to text software. Errors in spelling or words which appear to be out of contact are unintentional, may be present due having escaped editing\"           "

## 2023-03-28 ENCOUNTER — PATIENT OUTREACH (OUTPATIENT)
Dept: CARE COORDINATION | Facility: CLINIC | Age: 38
End: 2023-03-28

## 2023-03-28 NOTE — PROGRESS NOTES
[facility] [place]    Background: Transitional Care Management program identified per system criteria and reviewed by [facility] team for possible outreach.    Assessment: Upon chart review, CCR Team member will not proceed with patient outreach related to this episode of Transitional Care Management program due to reason below:    Missing/ invalid number    Plan: Transitional Care Management episode addressed appropriately per reason noted above.      RILEY Early  [facility]    *Connected Care Resource Team does NOT follow patient ongoing. Referrals are identified based on internal discharge reports and the outreach is to ensure patient has an understanding of their discharge instructions.

## 2023-07-21 ENCOUNTER — HOSPITAL ENCOUNTER (EMERGENCY)
Facility: CLINIC | Age: 38
Discharge: HOME | End: 2023-07-21
Attending: EMERGENCY MEDICINE | Admitting: EMERGENCY MEDICINE
Payer: MEDICAID

## 2023-07-21 VITALS
TEMPERATURE: 97.5 F | SYSTOLIC BLOOD PRESSURE: 130 MMHG | DIASTOLIC BLOOD PRESSURE: 81 MMHG | OXYGEN SATURATION: 97 % | HEART RATE: 53 BPM | RESPIRATION RATE: 17 BRPM

## 2023-07-21 DIAGNOSIS — F15.20 AMPHETAMINE USE DISORDER, SEVERE (H): ICD-10-CM

## 2023-07-21 PROCEDURE — 99283 EMERGENCY DEPT VISIT LOW MDM: CPT | Performed by: EMERGENCY MEDICINE

## 2023-07-21 PROCEDURE — 99282 EMERGENCY DEPT VISIT SF MDM: CPT | Performed by: EMERGENCY MEDICINE

## 2023-07-21 NOTE — ED TRIAGE NOTES
Pt came into the ER due to wanting to be admitted to a detox center  Pt admitted to using meth and alcohol   Pts last use of meth was 1800

## 2023-07-21 NOTE — ED PROVIDER NOTES
"  History     Chief Complaint   Patient presents with    Drug / Alcohol Assessment     HPI  Chu Pimentel is a 37 year old male with PMH notable for amphetamine use disorder, ADHD, CULLEN  who presents to the ED with request for detox.  Patient reports that he wants to stop meth use.  He states that he has used for several years, uses daily.  Most recent use was 6 PM.  He states that recently he \"just feel like crap\".  However, patient does not clarify what that means even with direct questioning.  He denies fever, abdominal pain, vomiting, diarrhea, cough.    Past Medical History  Past Medical History:   Diagnosis Date    Anxiety     Depression     Drug abuse (H)     HTN (hypertension)     Hypertension     Obesity      Past Surgical History:   Procedure Laterality Date    CHOLECYSTECTOMY  2012     amLODIPine (NORVASC) 10 MG tablet  amphetamine-dextroamphetamine (ADDERALL) 10 MG tablet  amphetamine-dextroamphetamine (ADDERALL) 30 MG tablet  atomoxetine (STRATTERA) 100 MG capsule  atomoxetine (STRATTERA) 100 MG capsule  bacitracin 500 UNIT/GM external ointment  Blood Pressure Monitoring (BLOOD PRESSURE CUFF) MISC  buprenorphine-naloxone (SUBOXONE) 8-2 MG SUBL sublingual tablet  buPROPion (WELLBUTRIN SR) 100 MG 12 hr tablet  buPROPion (WELLBUTRIN XL) 150 MG 24 hr tablet  citalopram (CELEXA) 10 MG tablet  citalopram (CELEXA) 40 MG tablet  citalopram (CELEXA) 40 MG tablet  colchicine (COLCYRS) 0.6 MG tablet  colchicine (MITIGARE) 0.6 MG capsule  escitalopram (LEXAPRO) 20 MG tablet  escitalopram (LEXAPRO) 20 MG tablet  hydrOXYzine (ATARAX) 25 MG tablet  ibuprofen (ADVIL,MOTRIN) 200 MG tablet  lisinopril (ZESTRIL) 10 MG tablet  lisinopril (ZESTRIL) 30 MG tablet  metFORMIN (GLUCOPHAGE) 500 MG tablet  metFORMIN (GLUCOPHAGE) 500 MG tablet  naloxone (NARCAN) 4 MG/0.1ML nasal spray  NARCAN 4 MG/0.1ML nasal spray  OLANZapine (ZYPREXA) 5 MG tablet  terbinafine (LAMISIL) 1 % external cream      Allergies   Allergen Reactions    " Penicillins Unknown    Penicillins      Family History  Family History   Problem Relation Age of Onset    Hypertension Mother     Hypertension Father      Social History   Social History     Tobacco Use    Smoking status: Every Day     Packs/day: 1.00     Types: Cigarettes    Smokeless tobacco: Never   Substance Use Topics    Alcohol use: Yes     Comment: a few times a month    Drug use: Yes     Frequency: 3.0 times per week     Types: Marijuana, Methamphetamines     Comment: Drug use: hx of meth use          A medically appropriate review of systems was performed with pertinent positives and negatives noted in the HPI, and all other systems negative.    Physical Exam   BP: 130/81  Pulse: 53  Temp: 97.5  F (36.4  C)  Resp: 17  SpO2: 97 %    Physical Exam  General: no acute distress. Appears stated age.   HENT: MMM, no oropharyngeal lesions  Eyes: PERRL, normal sclerae  Cardio: Borderline bradycardic rate. Regular rhythm. Extremities well perfused  Resp: Normal work of breathing, normal respiratory rate.  Neuro: alert and fully oriented. CN II-XII grossly intact. Grossly normal strength and sensation in all extremities.   MSK: no deformities. Grossly normal ROM in extremities.   Integumentary/Skin: no rash visualized, normal color  Psych: Mildly agitated affect    ED Course      Procedures          Labs Ordered and Resulted from Time of ED Arrival to Time of ED Departure - No data to display  No orders to display            Medical Decision Making  The patient's presentation was of moderate complexity (a chronic illness mild to moderate exacerbation, progression, or side effect of treatment).    The patient's evaluation involved:  discussion of management or test interpretation with another health professional ([facility])    The patient's management necessitated only low risk treatment.      Assessments & Plan (with Medical Decision Making)   Patient presenting with request for detox from methamphetamines. Vitals  in the ED unremarkable. Nursing notes reviewed.     Patient notified that there is not a dangerous withdrawal syndrome with methamphetamine discontinuation.  Thus, [facility] detox admissions. Offered to check into other area detox units.  Patient agreeable with potential transfer to [facility], declines [address] [place] [address].  [facility] contacted, they do not have any available beds at this time.    Patient not demonstrating evidence of acute intoxication.  The complete clinical picture is most consistent with amphetamine use disorder. After counseling on the diagnosis, work-up, and treatment plan, the patient was discharged to.  Patient provided with resources for substance use cessation and information on multiple detox units in the [facility]. The patient was advised to return to the ED if any urgent health concerns.     Final diagnoses:   Amphetamine use disorder, severe (H)     Discharge Medication List as of 7/21/2023  4:09 AM        --  Douglas Schneider MD   Emergency Medicine   [facility] EMERGENCY DEPARTMENT  7/21/2023       Douglas Schneider MD  07/22/23 0938

## 2023-07-21 NOTE — DISCHARGE INSTRUCTIONS
Please use the below resources and your primary care physician to safely cease alcohol and/or substance use.     Return to the ED if you are having any urgent/life-threatening concerns.     DISCHARGE RESOURCES:  -[facility]: [phone] (detox), [phone] (outpatient & L[facility]ging [facility])  -[facility] ([address]): [phone]  -[facility]: [phone]  -[facility]: [phone]  -SMART Recovery - self management for addiction recovery:  www.smartrecovery.org    -[facility] ~ A Health Crisis Resource & Support Center: 578.633.9542.  -[facility] [phone]   -Substance Abuse and Mental Health Services (www.samhsa.gov)  -Harm Reduction Coalition (www. Harmreduction.org)  -[place] Opioid Prevention Coalition: www.opioidcoalition.org  -Poison control 1-837.294.1098       Sober Support Group Information:  AA/NA & Sponsor/Support  -Alcoholics Anonymous (www.alcoholics-anonymous.org): for local information 24 hours/day  -AA Intergroup service office in [place] (http://www.aastpaul.org/) [phone]  -AA Intergroup service office in [facility]: [phone]. (http://www.a[place].org/)  -Narcotics Anonymous (www.nam[place].org) (824) 252-3331   -Sober Fun Activities: www.sober-activities.Specific Media.[employer] Recovery Connection ([facility])  [facility] connects people seeking recovery to resources that help foster and sustain long-term recovery.  Whether you are seeking resources for treatment, transportation, housing, job training, education, health care or other pathways to recovery, [facility] is a great place to start.   Phone: 159.270.5869. www.[employer]gate Systems.[employer] (Great listing of all types of recovery and non-recovery related resources)

## 2023-07-24 ENCOUNTER — HOSPITAL ENCOUNTER (EMERGENCY)
Facility: CLINIC | Age: 38
Discharge: HOME | End: 2023-07-24
Attending: EMERGENCY MEDICINE | Admitting: EMERGENCY MEDICINE
Payer: MEDICAID

## 2023-07-24 VITALS
DIASTOLIC BLOOD PRESSURE: 79 MMHG | BODY MASS INDEX: 34.54 KG/M2 | OXYGEN SATURATION: 99 % | RESPIRATION RATE: 16 BRPM | HEART RATE: 60 BPM | WEIGHT: 255 LBS | HEIGHT: 72 IN | SYSTOLIC BLOOD PRESSURE: 125 MMHG | TEMPERATURE: 97.4 F

## 2023-07-24 DIAGNOSIS — F19.10 POLYSUBSTANCE ABUSE (H): ICD-10-CM

## 2023-07-24 LAB — ALCOHOL BREATH TEST: 0 (ref 0–0.01)

## 2023-07-24 PROCEDURE — 99284 EMERGENCY DEPT VISIT MOD MDM: CPT | Performed by: EMERGENCY MEDICINE

## 2023-07-24 PROCEDURE — 99285 EMERGENCY DEPT VISIT HI MDM: CPT | Mod: 25 | Performed by: EMERGENCY MEDICINE

## 2023-07-24 PROCEDURE — 90791 PSYCH DIAGNOSTIC EVALUATION: CPT

## 2023-07-24 ASSESSMENT — COLUMBIA-SUICIDE SEVERITY RATING SCALE - C-SSRS
3. HAVE YOU BEEN THINKING ABOUT HOW YOU MIGHT KILL YOURSELF?: NO
FIRST ATTEMPT DATE: 66291
1. HAVE YOU WISHED YOU WERE DEAD OR WISHED YOU COULD GO TO SLEEP AND NOT WAKE UP?: YES
ATTEMPT LIFETIME: YES
MOST RECENT DATE: 66291
REASONS FOR IDEATION PAST MONTH: DOES NOT APPLY
LETHALITY/MEDICAL DAMAGE CODE MOST RECENT ACTUAL ATTEMPT: NO PHYSICAL DAMAGE OR VERY MINOR PHYSICAL DAMAGE
LETHALITY/MEDICAL DAMAGE CODE MOST LETHAL ACTUAL ATTEMPT: NO PHYSICAL DAMAGE OR VERY MINOR PHYSICAL DAMAGE
LETHALITY/MEDICAL DAMAGE CODE FIRST ACTUAL ATTEMPT: NO PHYSICAL DAMAGE OR VERY MINOR PHYSICAL DAMAGE
ATTEMPT PAST THREE MONTHS: NO
MOST LETHAL DATE: 66291
1. IN THE PAST MONTH, HAVE YOU WISHED YOU WERE DEAD OR WISHED YOU COULD GO TO SLEEP AND NOT WAKE UP?: YES
4. HAVE YOU HAD THESE THOUGHTS AND HAD SOME INTENTION OF ACTING ON THEM?: NO
5. HAVE YOU STARTED TO WORK OUT OR WORKED OUT THE DETAILS OF HOW TO KILL YOURSELF? DO YOU INTEND TO CARRY OUT THIS PLAN?: YES
TOTAL  NUMBER OF ACTUAL ATTEMPTS LIFETIME: 1
LETHALITY/MEDICAL DAMAGE CODE MOST LETHAL POTENTIAL ATTEMPT: BEHAVIOR NOT LIKELY TO RESULT IN INJURY
2. HAVE YOU ACTUALLY HAD ANY THOUGHTS OF KILLING YOURSELF?: YES
6. HAVE YOU EVER DONE ANYTHING, STARTED TO DO ANYTHING, OR PREPARED TO DO ANYTHING TO END YOUR LIFE?: NO
LETHALITY/MEDICAL DAMAGE CODE MOST RECENT POTENTIAL ATTEMPT: BEHAVIOR NOT LIKELY TO RESULT IN INJURY
LETHALITY/MEDICAL DAMAGE CODE FIRST POTENTIAL ATTEMPT: BEHAVIOR NOT LIKELY TO RESULT IN INJURY
5. HAVE YOU STARTED TO WORK OUT OR WORKED OUT THE DETAILS OF HOW TO KILL YOURSELF? DO YOU INTEND TO CARRY OUT THIS PLAN?: NO
REASONS FOR IDEATION LIFETIME: DOES NOT APPLY
4. HAVE YOU HAD THESE THOUGHTS AND HAD SOME INTENTION OF ACTING ON THEM?: NO
TOTAL  NUMBER OF INTERRUPTED ATTEMPTS LIFETIME: NO
TOTAL  NUMBER OF ABORTED OR SELF INTERRUPTED ATTEMPTS LIFETIME: NO
2. HAVE YOU ACTUALLY HAD ANY THOUGHTS OF KILLING YOURSELF?: YES

## 2023-07-24 ASSESSMENT — ACTIVITIES OF DAILY LIVING (ADL)
ADLS_ACUITY_SCORE: 35

## 2023-07-24 NOTE — PROGRESS NOTES
Chu STEWARD Margarito  July 24, 2023  Plan of Care Hand-off Note     Patient Care Path: observation    Plan for Care:   Patient states he is suicidal with a plan that he did not want to discuss and he would not state intent.  He was under the influence of meth and cannabis.  He will be observed for less than 48 hours and reassessed for planning.    Identified Goals and Safety Issues: Address SI and QUINCY.    Overview:  Ninfa Pimentel, mother, [phone] Axel Pimentel, father, [phone]          Legal Status:  Voluntary    Psychiatry Consult:  Patient did not consent       Updated   regarding plan of care.           Magdalena Busch, LICSW

## 2023-07-24 NOTE — ED PROVIDER NOTES
Extended care is recommending discharge today.  The patient was currently in ed obs status.  He apparently uses meth daily and due to this has chronic hallucinations.  He also has crhonic si.  He has hx of chronic noncompliance with medications so medications are not recommended at discharge.  He is to follow up at the [facility] clinic that can offer providers for him. Please see extended care note for further details.           Zoila Zhang MD  07/26/23 1953

## 2023-07-24 NOTE — DISCHARGE INSTRUCTIONS
Aftercare Plan  Please contact the [facility] clinic for continued mental health care.  The clinic coordinator is listed below. You can call and make an appointment or drop in for assistance.        Cristela Hernandez, CHW   [address] [place] [address] [place] [address]   [phone] (Work)   [phone] (Fax)  [facility] Care Coordination     Telephone [address]   [address]   [place] [address]   [phone]        [facility]  [address] [place] [address]  ([facility], 1st Floor)  [phone]    If I am feeling unsafe or I am in a crisis, I will:   Contact my established care providers   Call the National Suicide Prevention Lifeline: 645  Go to the nearest emergency room   Call 086     Warning signs that I or other people might notice when a crisis is developing for me:   Agitation, hopelessness    Things I am able to do on my own to cope or help me feel better:   Go see my therapist. Be out in nature. Call a crisis line.      Things that I am able to do with others to cope or help me better:   Check in with my friends     Things I can use or do for distraction:   Nature, take a walk     Changes I can make to support my mental health and wellness:   Stop using drugs and alcohol, they make my symptoms worse     People in my life that I can ask for help:          Your [facility] has a mental health crisis team you can call 24/7: [facility] Mobile Crisis  [phone]     Other things that are important when I'm in crisis: Try new things, allow your body to rest.     Additional resources and information:     Please visit [facility] as listed above for your continued mental health care        Crisis Lines  Crisis Text Line  Text 198297  You will be connected with a trained live crisis counselor to provide support.    Por kiki, raulo  BHUPENDRA a 834769 o texto a 442-AYUDAME en WhatsApp    [employer] (LGBTQ Youth Crisis  "Line)  4.730.360.3066  text START to 786-707      Community Resources  Fast Tracker  Linking people to mental health and substance use disorder resources  VendAsta.Mobii     [place] Mental Health Warm Line  Peer to peer support  Monday thru Saturday, 12 pm to 10 pm  111.994.1650 or 4.289.139.0608  Text \"Support\" to 23538    National [employer] on Mental Illness (MONTSERRAT)  762.473.3536 or 1.888.MONTSERRAT.HELPS      Mental Health Apps  My3  https://eCullet.org/    VirtualHopeBox  https://Health Gorilla/apps/virtual-hope-box/      Additional Information  Today you were seen by a licensed mental health professional through Triage and Transition services, Behavioral Healthcare Providers ([facility])  for a crisis assessment in the Emergency Department at [facility].  It is recommended that you follow up with your established providers (psychiatrist, mental health therapist, and/or primary care doctor - as relevant) as soon as possible. Coordinators from [facility] will be calling you in the next 24-48 hours to ensure that you have the resources you need.  You can also contact [facility] coordinators directly at [phone]. You may have been scheduled for or offered an appointment with a mental health provider. [facility] maintains an extensive network of licensed behavioral health providers to connect patients with the services they need.  We do not charge providers a fee to participate in our referral network.  We match patients with providers based on a patient's specific needs, insurance coverage, and location.  Our first effort will be to refer you to a provider within your care system, and will utilize providers outside your care system as needed.         "

## 2023-07-24 NOTE — CONSULTS
Diagnostic Evaluation Consultation  Crisis Assessment    Patient Name: Chu Pimentel  Age:  37 year old  Legal Sex: male  Gender Identity: male  Pronouns:   Race:    Black or   White  Ethnicity: Not  or   Language: English      Patient was assessed: In person  Patient location: [facility] EMERGENCY DEPARTMENT     Referral Data and Chief Complaint  Chu Pimentel is a 37 year old, male who identifies as male and speaks English.  race is    Black or   White and ethnicity is Not  or .  Chu Pimentel presents to the ED via EMS. Patient is presenting to the ED for the following concerns: Verbal agitation, Suicidal ideation, Depression, Substance use.   Factors that make the mental health crisis life threatening or complex are:  Patient states he has SI with plans that he doesn't want to discuss..        Informed Consent and Assessment Methods  Explained the crisis assessment process, including applicable information disclosures and limits to confidentiality, assessed understanding of the process, and obtained consent to proceed with the assessment.  Assessment methods included conducting a formal interview with patient, review of medical records, collaboration with medical staff, and obtaining relevant collateral information from family and community providers when available: done     Patient response to interventions: refused to respond  Coping skills were attempted to reduce the crisis:  None attempted     History of the Crisis   Patient has frequent ED visits.    Brief Psychosocial History  Family:  Single, Children yes (Two children living with their mother.)  Support System:  None  Employment Status:  unemployed  Source of Income:  none  Financial Environmental Concerns:     Current Hobbies:     Barriers in Personal Life:  other (see comments) (extreme substance use)    Significant Clinical History  Current Anxiety Symptoms:     Current  Depression/Trauma:  avoidance, difficulty concentrating, impaired decision making, irritable, helplessness, thoughts of death/suicide  Current Somatic Symptoms:     Current Psychosis/Thought Disturbance:  impulsive, high risk behavior  Current Eating Symptoms:     Chemical Use History:  Alcohol: None  Last Use:: 07/23/23  Benzodiazepines: None  Opiates: None  Cocaine: None  Marijuana: Daily  Last Use:: 07/23/23  Other Use: Methamphetamines  Last Use:: 07/24/23   Past diagnosis:  ADHD, Anxiety Disorder, Depression, Substance Use Disorder  Family history:     Past treatment:  Primary Care, Residential Treatment  Details of most recent treatment:  [facility] treatment  Other relevant history:          Collateral Information  Is there collateral information: No   Collateral information name, relationship, phone number:     What happened today:     What is different about patient's functioning:     Concern about alcohol/drug use:    What do you think the patient needs:    Has patient made comments about wanting to kill themselves/others:    If d/c is recommended, can they take part in safety/aftercare planning:     Additional collateral information:        Risk Assessment  Romayor Suicide Severity Rating Scale Full Clinical Version:  Suicidal Ideation  Q1 Wish to be Dead (Lifetime): Yes  Q2 Non-Specific Active Suicidal Thoughts (Lifetime): Yes  3. Active Suicidal Ideation with any Methods (Not Plan) Without Intent to Act (Lifetime): No  Q4 Active Suicidal Ideation with Some Intent to Act, Without Specific Plan (Lifetime): No  Q5 Active Suicidal Ideation with Specific Plan and Intent (Lifetime): Yes  Q6 Suicide Behavior (Lifetime): no     Suicidal Behavior (Lifetime)  Actual Attempt (Lifetime): No  Has subject engaged in non-suicidal self-injurious behavior? (Lifetime): Yes  Interrupted Attempts (Lifetime): No  Aborted or Self-Interrupted Attempt (Lifetime): No  Preparatory Acts or Behavior (Lifetime): No    Romayor Suicide  Severity Rating Scale Since Last Contact:   ZZZ  Actual Attempt (Lifetime): No  Has subject engaged in non-suicidal self-injurious behavior? (Lifetime): Yes  Interrupted Attempts (Lifetime): No  Aborted or Self-Interrupted Attempt (Lifetime): No  Preparatory Acts or Behavior (Lifetime): No          Environmental or Psychosocial Events: unemployment/underemployment, unstable housing, homelessness, excessive debt, poor finances, ongoing abuse of substances  Protective Factors:      Does the patient have thoughts of harming others? Feels Like Hurting Others: other (see comments) (verbal aggression exhibited in past visits)  Previous Attempt to Hurt Others: no  Current presentation: Irritable, Confused  Violence Threats in Past 6 Months: None  Current Violence Plan or Thoughts: None  Is the patient engaging in sexually inappropriate behavior?: no  Duty to warn initiated: no    Is the patient engaging in sexually inappropriate behavior?  no        Current Substance Abuse  Is there recent substance abuse?     Was a urine drug screen or blood alcohol level obtained:    Mental health and substance abuse treatment history:       Mental Status Exam   Affect: Flat  Appearance: Appropriate  Attention Span/Concentration: Inattentive  Eye Contact: Avoidant    Fund of Knowledge: Appropriate   Language /Speech Content: Fluent  Language /Speech Volume: Soft  Language /Speech Rate/Productions: Articulate  Recent Memory: Variable  Remote Memory: Variable  Mood: Irritable  Orientation to Person: Yes   Orientation to Place: Yes  Orientation to Time of Day: Yes  Orientation to Date: No     Situation (Do they understand why they are here?): Yes  Psychomotor Behavior: Underactive  Thought Content: Hallucinations, Suicidal, Paranoia  Thought Form: Intact     Mini-Cog Assessment  Number of Words Recalled:    Clock-Drawing Test:     Three Item Recall:    Mini-Cog Total Score:       Medication  Psychotropic medications:   Medication Orders -  Psychiatric (From admission, onward)    None           Current Care Team  Patient Care Team:  No Ref-Primary, Physician as PCP - General  No Ref-Primary, Physician  Riri Ortiz, AKBAR CNP as Assigned PCP    Diagnosis  Patient Active Problem List   Diagnosis Code     Essential hypertension I10     Anxiety F41.9     Recurrent major depressive disorder (H) F33.9     Migraine without aura G43.009     Tobacco abuse Z72.0     Class 3 severe obesity due to excess calories with serious comorbidity and body mass index (BMI) of 40.0 to 44.9 in adult (H) E66.01, Z68.41     Insomnia due to other mental disorder F51.05, F99     Attention deficit hyperactivity disorder (ADHD), predominantly inattentive type F90.0     CULLEN (generalized anxiety disorder) F41.1     Moderate episode of recurrent major depressive disorder (H) F33.1     Methamphetamine abuse (H) F15.10     Paranoid ideation (H) F22     Trench foot, unspecified laterality, initial encounter T69.029A     At risk for unsafe behavior Z91.89     Infection due to 2019 novel coronavirus U07.1       Clinical Summary and Substantiation of Recommendations   Patient states he is suicidal with a plan that he did not want to discuss and he would not state intent.  He was under the influence of meth and cannabis.  He will be observed for less than 48 hours and reassessed for planning.    Patient coping skills attempted to reduce the crisis:  None attempted    Disposition  Recommended disposition: Observation        Reviewed case and recommendations with attending provider. Attending Name: Patient is not safe to discharge, but he's not likely to benefit from admission.  Discussed with Ana Gonzalez MD       Attending concurs with disposition: yes       Patient and/or validated legal guardian concurs with disposition:   yes       Final disposition:  observation    Legal status on admission:      Assessment Details   Total duration spent on the patient case in minutes: 30 min      CPT code(s) utilized: 07610 - Psychotherapy for Crisis - 60 (30-74*) min    Magdalena Busch [facility], Psychotherapist  DEC - Triage & Transition Services  Callback: [phone]

## 2023-07-24 NOTE — PROGRESS NOTES
"Triage & Transition Services, Extended Care     Therapy Progress Note    Patient: Chu goes by \"Chu,\" uses he/him pronouns  Date of Service: July 24, 2023  Site of Service: [facility]  Patient was seen in-person.     Presenting problem:   Chu is followed related to  observation status due to drug use . Please see initial DEC/[facility] Crisis Assessment completed by Magdalena Busch on 7/24/23 for complete assessment information. Notable concerns include Suicidal Ideation, methamphetamine intoxication.     Individuals Present: Chu & Harriet Nicholas [facility]    Session start: 205  Session end: 245  Session duration in minutes: 40  Session number: 1  Anticipated number of sessions or this episode of care: 1-4  CPT utilized: 85467 Psychotherapy 40 minutes    Current Presentation:     Per RN the patient has been able to eat and sleep most of the day. Overnight the pt was too intoxicated to discharge safely. Now the pt is alert and oriented. Pt was difficult to engage in assessment as he believes he has a bed waiting in the hospital. Pt was aggressive, yelling at writer, but agreed to go to a consult room. Once in the consult room the pt stated \"You cannot make me leave, I will kill myself.\" Pt endorsed these feelings of suicidal ideation happen 2-5 times a week for the last six months. Pt states that these thoughts are fleeting. Pt does endorse hallucinations, again which has been an ongoing issue due to his daily methamphetamine usage. The pt denies taking any mental health medications, and is not interested in them at this time.    Per chart review the pt had planned to call [facility] to get his SNAP and cash benefits reactivated. Writer reminded pt of this and encouraged pt to go in person to the [facility] on [address] or call [phone]. Pt agreed.    Pt has a care coordinator at [facility]'s clinic  Telephone [facility]   [address]   [address]   [place] [address] [phone]      Pt has had " multiple phone conversations with clinic staff for assistance and mental health care, case management.     Pt denies having been to an emergency room in the last 30 days. Per chart review the pt has been homeless for many years and has been to [facility] 5 times this month with similar complaints. Pt has also been in treatment for [facility] at least once this month and discharged early. Pt currently denies and issues with substance use.    [place]er Suicide Severity Rating Scale Since Last Contact: Completed on same day (7/24) at 300pm        Actual/Potential Lethality (Most Lethal Attempt)  Most Lethal Attempt Date: 07/01/22  Actual Lethality/Medical Damage Code (Most Lethal Attempt): No physical damage or very minor physical damage  Potential Lethality Code (Most Lethal Attempt): Behavior not likely to result in injury        Mental Status Exam:   Appearance: awake, alert and uncooperative, combative  Attitude: evasive and uncooperative  Eye Contact: fair  Mood: angry  Affect: intensity is dramatic and reactive  Speech: rambling  Psychomotor Behavior: no evidence of tardive dyskinesia, dystonia, or tics  Thought Process:  disorganized and clear  Associations: no loose associations  Thought Content: passive suicidal ideation present, no auditory hallucinations present, and no visual hallucinations present  Insight: limited  Judgement: intact  Oriented to: time, person, and place  Attention Span and Concentration: limited  Recent and Remote Memory: poor    Diagnosis:     Methamphetamine abuse (H) F15.10      F41.9 Anxiety, Unspecified    Therapeutic Intervention(s):   Provided active listening, unconditional positive regard, and validation. Engaged in safety planning.  Coached on coping techniques/relaxation skills to help improve distress tolerance and managing intense emotions.    Treatment Objective(s) Addressed:   The focus of this session was on safety planning and identifying an appropriate aftercare plan    Progress  Towards Goals:   Patient reports stable symptoms. Patient is making progress towards treatment goals as evidenced by his ability to participate in the assessments.     Case Management:   NA    General Recommendations:   Continue to monitor for harm. Consider: NA    Plan:   Discharge:     Pt is able to safety plan and endorses that his level of suicidal ideation has been the same for the last 6 months. This is his chronic presentation which is worsened due to his continued methamphetamine use.     Pt was given the resources to continue following up with the [facility] clinic for further mental health support. Pt denies substance use supports. Pt denies suicidal ideation with plan and/or intent. Pt endorses that homelessness is his major stressor. Pt has resources for homeless shelters and prefers to sleep rough. The patient is able to receive mental health, physical health, and resource gathering in the community.       Plan for Care reviewed with Assigned Medical Provider? Yes: . Provider, Dr. Rowdy MD, response: agreement     Harriet Centeno [facility]   Licensed Mental Health Professional (LMHP), [facility]  [phone]

## 2023-07-24 NOTE — ED TRIAGE NOTES
Patient was BIBA. Patient said he called 911 because he feels suicidal. EMS found patient laying on a platform.      Triage Assessment     Row Name 07/24/23 0254       Triage Assessment (Adult)    Airway WDL WDL       Respiratory WDL    Respiratory WDL WDL       Skin Circulation/Temperature WDL    Skin Circulation/Temperature WDL X  blister right foot       Cardiac WDL    Cardiac WDL WDL       Peripheral/Neurovascular WDL    Peripheral Neurovascular WDL WDL       Cognitive/Neuro/Behavioral WDL    Cognitive/Neuro/Behavioral WDL WDL

## 2023-07-24 NOTE — ED NOTES
RN went to discharge patient, refused to sit up or go through paperwork with RN. Asked for shoes but told there are no shoes available, given extra socks. Refused to get up and continues to lay with eyes closed

## 2023-07-24 NOTE — ED NOTES
Bed: URE-B  Expected date:   Expected time:   Means of arrival:   Comments:  Amanda Maine [age] y/o M SI

## 2023-07-24 NOTE — ED PROVIDER NOTES
ED Provider Note  [facility]      History     Chief Complaint   Patient presents with     Suicidal     HPI  Chu Pimentel is a 37 year old male who has a past medical history of depression, anxiety, ADHD, substance use disorder (smokes methamphetamine, drinks alcohol), hx of drug induced psychosis who presents to the ED by EMS for mental health evaluation.  Patient called 911 because he was feeling suicidal.  Patient reports suicidal ideation with a plan but initially does not want to disclose the plan.  Patient denies any homicidal ideation.  Patient states he has not been taking his medications.  Later in interview patient states that his plan is to jump off a bridge.  Patient reports alcohol use, methamphetamine use.  Last used methamphetamine last night.  Patient is currently homeless.  Patient with no other medical complaints.         Past Medical History  Past Medical History:   Diagnosis Date     Anxiety      Depression      Drug abuse (H)      HTN (hypertension)      Hypertension      Obesity      Past Surgical History:   Procedure Laterality Date     CHOLECYSTECTOMY  2012     amLODIPine (NORVASC) 10 MG tablet  amphetamine-dextroamphetamine (ADDERALL) 10 MG tablet  amphetamine-dextroamphetamine (ADDERALL) 30 MG tablet  atomoxetine (STRATTERA) 100 MG capsule  atomoxetine (STRATTERA) 100 MG capsule  bacitracin 500 UNIT/GM external ointment  Blood Pressure Monitoring (BLOOD PRESSURE CUFF) MISC  buprenorphine-naloxone (SUBOXONE) 8-2 MG SUBL sublingual tablet  buPROPion (WELLBUTRIN SR) 100 MG 12 hr tablet  buPROPion (WELLBUTRIN XL) 150 MG 24 hr tablet  citalopram (CELEXA) 10 MG tablet  citalopram (CELEXA) 40 MG tablet  citalopram (CELEXA) 40 MG tablet  colchicine (COLCYRS) 0.6 MG tablet  colchicine (MITIGARE) 0.6 MG capsule  escitalopram (LEXAPRO) 20 MG tablet  escitalopram (LEXAPRO) 20 MG tablet  hydrOXYzine (ATARAX) 25 MG tablet  ibuprofen (ADVIL,MOTRIN) 200 MG tablet  lisinopril  (ZESTRIL) 10 MG tablet  lisinopril (ZESTRIL) 30 MG tablet  metFORMIN (GLUCOPHAGE) 500 MG tablet  metFORMIN (GLUCOPHAGE) 500 MG tablet  naloxone (NARCAN) 4 MG/0.1ML nasal spray  NARCAN 4 MG/0.1ML nasal spray  OLANZapine (ZYPREXA) 5 MG tablet  terbinafine (LAMISIL) 1 % external cream      Allergies   Allergen Reactions     Penicillins Unknown     Penicillins      Family History  Family History   Problem Relation Age of Onset     Hypertension Mother      Hypertension Father      Social History   Social History     Tobacco Use     Smoking status: Every Day     Packs/day: 1.50     Types: Cigarettes     Smokeless tobacco: Never   Substance Use Topics     Alcohol use: Yes     Comment: a few times a month     Drug use: Yes     Frequency: 3.0 times per week     Types: Marijuana, Methamphetamines     Comment: last use today      Past medical history, past surgical history, medications, allergies, family history, and social history were reviewed with the patient. No additional pertinent items.      A complete review of systems was attempted but limited due to altered mental status.    Physical Exam   BP: 125/79  Pulse: 60  Temp: 97.4  F (36.3  C)  Resp: 16  Height: 182.9 cm (6')  Weight: 115.7 kg (255 lb)  SpO2: 99 %  Physical Exam  General: Afebrile, no acute distress   HEENT: Normocephalic, atraumatic, conjunctivae normal. MMM  Neck: non-tender, supple  Cardio: regular rate. regular rhythm   Resp: Normal work of breathing, no respiratory distress, lungs clear bilaterally, no wheezing, rhonchi, rales  Chest/Back: no visual signs of trauma, no CVA tenderness   Abdomen: soft, non distension, no tenderness, no peritoneal signs   Neuro: alert and fully oriented. CN II-XII grossly intact. Grossly normal strength and sensation in all extremities.   MSK: no deformities. Normal range of motion  Integumentary/Skin: no rash visualized, normal color  Psych: normal affect, normal behavior, reports suicidal ideation with plan to jump off  a bridge, denies homicidal ideation, thought content is not paranoid or delusional      ED Course, Procedures, & Data      Procedures  ED Course Selections: -----  Observation Addendum  With this Addendum, this ED Provider Note may also serve as an Observation H&P    Observation Initiation Date: Jul 24, 2023    Patient presenting with suicide ideation .    A DEC assessment was completed, and the case was discussed with the . The  recommended observation in the emergency department with reevaluation to see if patient is able to contract for safety clinically sober. See separate DEC note from today's date for details on the assessment.    During the initial care period, the patient did not require medications for agitation, and did not require restraints/seclusion for patient and/or provider safety.     The patient's outpatient medications were not reconciled and ordered.  Patient states he has not been taking his medications for the past 1.5 months and is unable to state which medications he takes at this time    The patient was found to have a psychiatric condition that would benefit from an observation stay in the emergency department for further psychiatric stabilization and/or coordination of a safe disposition. The observation plan includes serial assessments of psychiatric condition, potential administration of medications if indicated, further disposition pending the patient's psychiatric course during the monitoring period.   -----   Mental Health Risk Assessment      PSS-3    Date and Time Over the past 2 weeks have you felt down, depressed, or hopeless? Over the past 2 weeks have you had thoughts of killing yourself? Have you ever attempted to kill yourself? When did this last happen? User   07/24/23 0256 yes yes yes within the last month (but not today) MQT      C-SSRS ([place])    Date and Time Q1 Wished to be Dead (Past Month) Q2 Suicidal Thoughts (Past Month) Q3 Suicidal Thought Method  Q4 Suicidal Intent without Specific Plan Q5 Suicide Intent with Specific Plan Q6 Suicide Behavior (Lifetime) Within the Past 3 Months? RETIRED: Level of Risk per Screen Screening Not Complete User   07/24/23 0256 yes yes yes -- yes -- -- -- -- MQT   07/24/23 0256 -- -- -- -- -- no -- -- -- SHT              Suicide assessment completed by mental health (D.E.C., LCSW, etc.)       Results for orders placed or performed during the hospital encounter of 07/24/23   Alcohol breath test POCT     Status: Normal   Result Value Ref Range    Alcohol Breath Test 0.00 0.00 - 0.01     Medications - No data to display  Labs Ordered and Resulted from Time of ED Arrival to Time of ED Departure   ALCOHOL BREATH TEST POCT - Normal       Result Value    Alcohol Breath Test 0.00       No orders to display          Critical care was not performed.     Medical Decision Making  The patient's presentation was of moderate complexity (a chronic illness mild to moderate exacerbation, progression, or side effect of treatment).    The patient's evaluation involved:  review of external note(s) from 3+ sources (Most recent ED visits)  ordering and/or review of 2 test(s) in this encounter (Alcohol,, urine drug screen)  discussion of management or test interpretation with another health professional (Behavioral health )    The patient's management necessitated high risk (a decision regarding hospitalization) and further care after sign-out to Morning provider (see their note for further management). -Emergency department observation      Assessment & Plan    Chu Pimentel is a 37 year old male who has a past medical history of depression, anxiety, ADHD, substance use disorder (smokes methamphetamine, drinks alcohol), hx of drug induced psychosis who presents to the ED by EMS for mental health evaluation.  Upon arrival patient is nontoxic-appearing, afebrile, no distress.  Patient sleeping, however arousable to verbal stimuli.  Patient here  with suicidal ideation with a plan to jump off the bridge.  Also reports methamphetamine use last night.  Behavioral health  evaluated the patient and at this time patient is not able to contract for safety.  We will plan on observation in the emergency department for safety planning.  If patient is able to contract for safety once clinically sober would consider discharge however if unable to contract for safety will consider admission to mental health unit.  Behavioral health and extended care to follow.  Patient signed out to morning provider pending reevaluation, disposition.    I have reviewed the nursing notes. I have reviewed the findings, diagnosis, plan and need for follow up with the patient.    New Prescriptions    No medications on file       Final diagnoses:   Suicide ideation       Ana Gonzalez MD  [facility] EMERGENCY DEPARTMENT  7/24/2023     Ana Gonzalez MD  07/24/23 0648

## 2023-08-12 ENCOUNTER — HEALTH MAINTENANCE LETTER (OUTPATIENT)
Age: 38
End: 2023-08-12

## 2023-08-25 ENCOUNTER — HOSPITAL ENCOUNTER (EMERGENCY)
Facility: HOSPITAL | Age: 38
Discharge: HOME | End: 2023-08-29
Attending: EMERGENCY MEDICINE | Admitting: EMERGENCY MEDICINE
Payer: MEDICAID

## 2023-08-25 DIAGNOSIS — F19.10 DRUG ABUSE (H): ICD-10-CM

## 2023-08-25 DIAGNOSIS — F33.3 SEVERE EPISODE OF RECURRENT MAJOR DEPRESSIVE DISORDER, WITH PSYCHOTIC FEATURES (H): ICD-10-CM

## 2023-08-25 PROCEDURE — 250N000013 HC RX MED GY IP 250 OP 250 PS 637: Performed by: EMERGENCY MEDICINE

## 2023-08-25 PROCEDURE — 80307 DRUG TEST PRSMV CHEM ANLYZR: CPT | Performed by: EMERGENCY MEDICINE

## 2023-08-25 PROCEDURE — 99285 EMERGENCY DEPT VISIT HI MDM: CPT | Mod: 25

## 2023-08-25 PROCEDURE — 81001 URINALYSIS AUTO W/SCOPE: CPT | Performed by: EMERGENCY MEDICINE

## 2023-08-25 PROCEDURE — 87635 SARS-COV-2 COVID-19 AMP PRB: CPT | Performed by: EMERGENCY MEDICINE

## 2023-08-25 RX ORDER — OLANZAPINE 5 MG/1
5 TABLET ORAL ONCE
Status: COMPLETED | OUTPATIENT
Start: 2023-08-25 | End: 2023-08-25

## 2023-08-25 RX ADMIN — OLANZAPINE 5 MG: 5 TABLET, FILM COATED ORAL at 23:26

## 2023-08-25 ASSESSMENT — ACTIVITIES OF DAILY LIVING (ADL): ADLS_ACUITY_SCORE: 35

## 2023-08-26 ENCOUNTER — TELEPHONE (OUTPATIENT)
Dept: BEHAVIORAL HEALTH | Facility: CLINIC | Age: 38
End: 2023-08-26
Payer: MEDICAID

## 2023-08-26 LAB
ALBUMIN SERPL BCG-MCNC: 3.5 G/DL (ref 3.5–5.2)
ALBUMIN UR-MCNC: NEGATIVE MG/DL
ALP SERPL-CCNC: 51 U/L (ref 40–129)
ALT SERPL W P-5'-P-CCNC: 11 U/L (ref 0–70)
AMPHETAMINES UR QL SCN: ABNORMAL
ANION GAP SERPL CALCULATED.3IONS-SCNC: 5 MMOL/L (ref 7–15)
APPEARANCE UR: ABNORMAL
AST SERPL W P-5'-P-CCNC: 16 U/L (ref 0–45)
BARBITURATES UR QL SCN: ABNORMAL
BASOPHILS # BLD AUTO: 0 10E3/UL (ref 0–0.2)
BASOPHILS NFR BLD AUTO: 1 %
BENZODIAZ UR QL SCN: ABNORMAL
BILIRUB SERPL-MCNC: 0.4 MG/DL
BILIRUB UR QL STRIP: NEGATIVE
BUN SERPL-MCNC: 9.1 MG/DL (ref 6–20)
BZE UR QL SCN: ABNORMAL
CALCIUM SERPL-MCNC: 8.8 MG/DL (ref 8.6–10)
CANNABINOIDS UR QL SCN: ABNORMAL
CHLORIDE SERPL-SCNC: 108 MMOL/L (ref 98–107)
COLOR UR AUTO: ABNORMAL
CREAT SERPL-MCNC: 1.02 MG/DL (ref 0.67–1.17)
DEPRECATED HCO3 PLAS-SCNC: 29 MMOL/L (ref 22–29)
EOSINOPHIL # BLD AUTO: 0.2 10E3/UL (ref 0–0.7)
EOSINOPHIL NFR BLD AUTO: 3 %
ERYTHROCYTE [DISTWIDTH] IN BLOOD BY AUTOMATED COUNT: 13.9 % (ref 10–15)
ETHANOL SERPL-MCNC: <0.01 G/DL
GFR SERPL CREATININE-BSD FRML MDRD: >90 ML/MIN/1.73M2
GLUCOSE SERPL-MCNC: 101 MG/DL (ref 70–99)
GLUCOSE UR STRIP-MCNC: NEGATIVE MG/DL
HCT VFR BLD AUTO: 42.4 % (ref 40–53)
HGB BLD-MCNC: 13.8 G/DL (ref 13.3–17.7)
HGB UR QL STRIP: NEGATIVE
IMM GRANULOCYTES # BLD: 0 10E3/UL
IMM GRANULOCYTES NFR BLD: 0 %
KETONES UR STRIP-MCNC: NEGATIVE MG/DL
LEUKOCYTE ESTERASE UR QL STRIP: NEGATIVE
LYMPHOCYTES # BLD AUTO: 2.1 10E3/UL (ref 0.8–5.3)
LYMPHOCYTES NFR BLD AUTO: 40 %
MCH RBC QN AUTO: 29 PG (ref 26.5–33)
MCHC RBC AUTO-ENTMCNC: 32.5 G/DL (ref 31.5–36.5)
MCV RBC AUTO: 89 FL (ref 78–100)
MONOCYTES # BLD AUTO: 0.4 10E3/UL (ref 0–1.3)
MONOCYTES NFR BLD AUTO: 8 %
NEUTROPHILS # BLD AUTO: 2.6 10E3/UL (ref 1.6–8.3)
NEUTROPHILS NFR BLD AUTO: 48 %
NITRATE UR QL: NEGATIVE
NRBC # BLD AUTO: 0 10E3/UL
NRBC BLD AUTO-RTO: 0 /100
OPIATES UR QL SCN: ABNORMAL
PCP QUAL URINE (ROCHE): ABNORMAL
PH UR STRIP: 6 [PH] (ref 5–7)
PLATELET # BLD AUTO: 192 10E3/UL (ref 150–450)
POTASSIUM SERPL-SCNC: 3.9 MMOL/L (ref 3.4–5.3)
PROT SERPL-MCNC: 5.7 G/DL (ref 6.4–8.3)
RBC # BLD AUTO: 4.76 10E6/UL (ref 4.4–5.9)
RBC URINE: 0 /HPF
SARS-COV-2 RNA RESP QL NAA+PROBE: NEGATIVE
SODIUM SERPL-SCNC: 142 MMOL/L (ref 136–145)
SP GR UR STRIP: 1.02 (ref 1–1.03)
UROBILINOGEN UR STRIP-MCNC: 2 MG/DL
WBC # BLD AUTO: 5.3 10E3/UL (ref 4–11)
WBC URINE: 0 /HPF

## 2023-08-26 PROCEDURE — 85025 COMPLETE CBC W/AUTO DIFF WBC: CPT | Performed by: EMERGENCY MEDICINE

## 2023-08-26 PROCEDURE — 80179 DRUG ASSAY SALICYLATE: CPT | Performed by: EMERGENCY MEDICINE

## 2023-08-26 PROCEDURE — 80143 DRUG ASSAY ACETAMINOPHEN: CPT | Performed by: EMERGENCY MEDICINE

## 2023-08-26 PROCEDURE — 36415 COLL VENOUS BLD VENIPUNCTURE: CPT | Performed by: EMERGENCY MEDICINE

## 2023-08-26 PROCEDURE — 250N000013 HC RX MED GY IP 250 OP 250 PS 637: Performed by: EMERGENCY MEDICINE

## 2023-08-26 PROCEDURE — 80053 COMPREHEN METABOLIC PANEL: CPT | Performed by: EMERGENCY MEDICINE

## 2023-08-26 PROCEDURE — 90791 PSYCH DIAGNOSTIC EVALUATION: CPT

## 2023-08-26 PROCEDURE — 82077 ASSAY SPEC XCP UR&BREATH IA: CPT | Performed by: EMERGENCY MEDICINE

## 2023-08-26 RX ORDER — CITALOPRAM HYDROBROMIDE 40 MG/1
40 TABLET ORAL ONCE
Status: COMPLETED | OUTPATIENT
Start: 2023-08-26 | End: 2023-08-26

## 2023-08-26 RX ORDER — AMLODIPINE BESYLATE 5 MG/1
10 TABLET ORAL ONCE
Status: COMPLETED | OUTPATIENT
Start: 2023-08-26 | End: 2023-08-26

## 2023-08-26 RX ORDER — AMLODIPINE BESYLATE 5 MG/1
10 TABLET ORAL DAILY
Status: DISCONTINUED | OUTPATIENT
Start: 2023-08-26 | End: 2023-08-26

## 2023-08-26 RX ORDER — OLANZAPINE 5 MG/1
5 TABLET ORAL EVERY 6 HOURS PRN
Status: DISCONTINUED | OUTPATIENT
Start: 2023-08-26 | End: 2023-08-29 | Stop reason: HOSPADM

## 2023-08-26 RX ORDER — CITALOPRAM HYDROBROMIDE 40 MG/1
40 TABLET ORAL DAILY
Status: DISCONTINUED | OUTPATIENT
Start: 2023-08-27 | End: 2023-08-29 | Stop reason: HOSPADM

## 2023-08-26 RX ORDER — LISINOPRIL 20 MG/1
20 TABLET ORAL DAILY
Status: DISCONTINUED | OUTPATIENT
Start: 2023-08-27 | End: 2023-08-29 | Stop reason: HOSPADM

## 2023-08-26 RX ADMIN — CITALOPRAM HYDROBROMIDE 40 MG: 40 TABLET ORAL at 00:48

## 2023-08-26 ASSESSMENT — ACTIVITIES OF DAILY LIVING (ADL)
ADLS_ACUITY_SCORE: 35

## 2023-08-26 NOTE — ED NOTES
Pt provided food per request. Cooperative with staff. Pt updated on process and POC, verbalized understanding.

## 2023-08-26 NOTE — ED PROVIDER NOTES
EMERGENCY DEPARTMENT ENCOUNTER      NAME: Chu Pimentel  AGE: 37 year old male  YOB: 1985  MRN: [id number]  EVALUATION DATE & TIME: 8/25/2023 10:43 PM    PCP: No Ref-Primary, Physician    ED PROVIDER: Kevin Lainez M.D.      Chief Complaint   Patient presents with    Suicidal         FINAL IMPRESSION:  1. Severe episode of recurrent major depressive disorder, with psychotic features (H)    2. Drug abuse (H)          ED COURSE & MEDICAL DECISION MAKING:    Pertinent Labs & Imaging studies reviewed. (See chart for details)  37 year old male presents to the Emergency Department for evaluation of depression, hallucinations and concern for command hallucinations to harm himself.  Patient has a long history of this.  Does have a history of meth abuse.  Last used 2 days ago.  Having continued hallucinations.  They are telling him to harm himself.  Continues to feel suicidal.  States he has been sleeping on [employer] grounds because he feels like this will protect him.  Patient seen by DEC .  Given patient's worsening symptoms in addition to failure of outpatient management with last discharge patient will be admitted for mental health.  He is voluntary at this time.  Patient will be signed out to the oncoming physician pending mental health patient.  Home medications are written.    11:10 PM I met with the patient to gather history and to perform my initial exam. I discussed the plan for care while in the Emergency Department.       At the conclusion of the encounter I discussed the results of all of the tests and the disposition. The questions were answered. The patient or family acknowledged understanding and was agreeable with the care plan.     Medical Decision Making    History:  Supplemental history from: N/A  External Record(s) reviewed: Outpatient Record: [facility] Emergency Department on 7/24/23    Work Up:  Chart documentation includes differential considered and any EKGs or imaging  independently interpreted by provider, where specified.  In additional to work up documented, I considered the following work up: Documented in chart, if applicable.    External consultation:  Discussion of management with another provider: Documented in chart, if applicable    Complicating factors:  Care impacted by chronic illness: Hypertension and Mental Health  Care affected by social determinants of health: Housing Insecurity    Disposition considerations: Admit.             MEDICATIONS GIVEN IN THE EMERGENCY:  Medications   amLODIPine (NORVASC) tablet 10 mg (has no administration in time range)   citalopram (celeXA) tablet 40 mg (has no administration in time range)   lisinopril (ZESTRIL) tablet 30 mg (has no administration in time range)   OLANZapine (zyPREXA) tablet 5 mg (has no administration in time range)   OLANZapine (zyPREXA) tablet 5 mg (5 mg Oral $Given 8/25/23 2326)   lisinopril (ZESTRIL) tablet 30 mg (30 mg Oral Not Given 8/26/23 0300)   amLODIPine (NORVASC) tablet 10 mg (10 mg Oral Not Given 8/26/23 0300)   citalopram (celeXA) tablet 40 mg (40 mg Oral $Given 8/26/23 0048)       NEW PRESCRIPTIONS STARTED AT TODAY'S ER VISIT  New Prescriptions    No medications on file          =================================================================    HPI    Patient information was obtained from: Patient     Use of : N/A         Chu Pimentel is a 37 year old male with a pertinent history of hypertension, depression, anxiety, ADHD, and substance use disorder who presents to this ED for evaluation of suicidal thoughts and auditory hallucinations.     Per chart review, patient was seen at [facility] Emergency Department on 7/24/23 for suicidal ideation and plan to jump off a bridge.  DEC  recommended observation in the emergency department with reevaluation to see if patient is able to contract for safety clinically sober. Patient has not been taking his medications for the past 1.5  months and is unable to state which medications he takes at this time Patient found to have a psychiatric condition that would benefit from an observation stay in the emergency department for further psychiatric stabilization and/or coordination of a safe disposition.     Patient reports he has suicidal thoughts and auditory hallucinations that has been ongoing for a few months. States he hears voices that tell him his fingers are going to get cut off if he doesn't do certain things. Notes he has been having thoughts of walking onto the freeway to get hit by a car. He states he has not been taking his medications, Zoloft and Prozac, because he was too scared to take them. He came to the ER today to get some medications and to get help moving forward. He states he wants to get his life back on track. He has been in and out of crisis centers and notes he has been sleeping on [employer] floors. Notes he is achy because he's been wandering around lately. Notes he last used methamphetamines 2 days ago.    Patient otherwise denies fever or vomiting.       PAST MEDICAL HISTORY:  Past Medical History:   Diagnosis Date    Anxiety     Depression     Drug abuse (H)     HTN (hypertension)     Hypertension     Obesity        PAST SURGICAL HISTORY:  Past Surgical History:   Procedure Laterality Date    CHOLECYSTECTOMY  2012           CURRENT MEDICATIONS:    Current Facility-Administered Medications   Medication    amLODIPine (NORVASC) tablet 10 mg    citalopram (celeXA) tablet 40 mg    lisinopril (ZESTRIL) tablet 30 mg    OLANZapine (zyPREXA) tablet 5 mg     Current Outpatient Medications   Medication    amLODIPine (NORVASC) 10 MG tablet    amphetamine-dextroamphetamine (ADDERALL) 10 MG tablet    amphetamine-dextroamphetamine (ADDERALL) 30 MG tablet    atomoxetine (STRATTERA) 100 MG capsule    atomoxetine (STRATTERA) 100 MG capsule    bacitracin 500 UNIT/GM external ointment    Blood Pressure Monitoring (BLOOD PRESSURE CUFF) MISC     buprenorphine-naloxone (SUBOXONE) 8-2 MG SUBL sublingual tablet    buPROPion (WELLBUTRIN SR) 100 MG 12 hr tablet    buPROPion (WELLBUTRIN XL) 150 MG 24 hr tablet    citalopram (CELEXA) 10 MG tablet    citalopram (CELEXA) 40 MG tablet    citalopram (CELEXA) 40 MG tablet    colchicine (COLCYRS) 0.6 MG tablet    colchicine (MITIGARE) 0.6 MG capsule    escitalopram (LEXAPRO) 20 MG tablet    escitalopram (LEXAPRO) 20 MG tablet    hydrOXYzine (ATARAX) 25 MG tablet    ibuprofen (ADVIL,MOTRIN) 200 MG tablet    lisinopril (ZESTRIL) 10 MG tablet    lisinopril (ZESTRIL) 30 MG tablet    metFORMIN (GLUCOPHAGE) 500 MG tablet    metFORMIN (GLUCOPHAGE) 500 MG tablet    naloxone (NARCAN) 4 MG/0.1ML nasal spray    NARCAN 4 MG/0.1ML nasal spray    OLANZapine (ZYPREXA) 5 MG tablet    terbinafine (LAMISIL) 1 % external cream         ALLERGIES:  Allergies   Allergen Reactions    Penicillins Unknown    Penicillins        FAMILY HISTORY:  Family History   Problem Relation Age of Onset    Hypertension Mother     Hypertension Father        SOCIAL HISTORY:   Social History     Socioeconomic History    Marital status: Single   Tobacco Use    Smoking status: Every Day     Packs/day: 1.50     Types: Cigarettes    Smokeless tobacco: Never   Substance and Sexual Activity    Alcohol use: Yes     Comment: a few times a month    Drug use: Yes     Frequency: 3.0 times per week     Types: Marijuana, Methamphetamines     Comment: last use today   Social History Narrative    ** Merged History Encounter **         7/15/2020        VITALS:  /69   Pulse 70   Temp 97.9  F (36.6  C) (Temporal)   Resp 18   SpO2 100%     PHYSICAL EXAM    Physical Exam  Vitals and nursing note reviewed.   Constitutional:       General: He is not in acute distress.     Appearance: He is not diaphoretic.   HENT:      Head: Atraumatic.   Eyes:      General: No scleral icterus.     Pupils: Pupils are equal, round, and reactive to light.   Cardiovascular:      Rate and  Rhythm: Normal rate and regular rhythm.      Heart sounds: Normal heart sounds.   Pulmonary:      Effort: No respiratory distress.      Breath sounds: Normal breath sounds.   Abdominal:      Palpations: Abdomen is soft.      Tenderness: There is no abdominal tenderness.   Musculoskeletal:         General: No tenderness.   Lymphadenopathy:      Cervical: No cervical adenopathy.   Skin:     General: Skin is warm.      Findings: No rash.           LAB:  All pertinent labs reviewed and interpreted.  Labs Ordered and Resulted from Time of ED Arrival to Time of ED Departure   DRUG ABUSE SCREEN 77 URINE (FL, RH, SH) - Abnormal       Result Value    Amphetamines Urine Screen Positive (*)     Barbituates Urine Screen Negative      Benzodiazepine Urine Screen Negative      Cannabinoids Urine Screen Negative      Opiates Urine Screen Negative      PCP Urine Screen Negative      Cocaine Urine Screen Negative     COVID-19 VIRUS (CORONAVIRUS) BY PCR - Normal    SARS CoV2 PCR Negative         RADIOLOGY:  Reviewed all pertinent imaging. Please see official radiology report.  No orders to display         IMikey, am serving as a scribe to document services personally performed by Dr. Kevin Lainez, based on my observation and the provider's statements to me. IKevin MD attest that Mikey Bello is acting in a scribe capacity, has observed my performance of the services and has documented them in accordance with my direction.    Kevin Lainez M.D.  Emergency Medicine  [facility] EMERGENCY DEPARTMENT  [facility] [facility] [address] [phone]  Dept: [phone]       Kevin Lainez MD  08/26/23 0712

## 2023-08-26 NOTE — TELEPHONE ENCOUNTER
S: [facility] ED , DEC  Antonina  calling at 03:43 about a 37 year old/Male presenting with hallucinations and SI     B: Pt arrived via Self . Presenting problem, stressors: Pt has been off psych meds for 2 months. Pt hears voices telling him to kill himself and has plans to jump off a tree or walk on highway with hopes a car will hit him. Pt reports he is afraid to be outdoors and is homeless. Pt also endorses meth use - BETTY was 2 days ago. Pt wants to get sober.     Pt affect in ED: Calm, Cooperative , and Flat  Pt Dx: Major Depressive Disorder, Generalized Anxiety Disorder, and Substance Use Disorder: meth - BETTY 2 days ago  Previous IPMH hx? No  Pt endorses SI with a plan to jump off tree or hit by traffic    Hx of suicide attempt? No  Pt denies SIB  Pt denies HI   Pt endorses auditory hallucinations .   Pt RARS Score: 3    Hx of aggression/violence, sexual offenses, legal concerns, Epic care plan? describe: No violence towards people but hx of property destruction; no sexual offenses or epic care plan  Current concerns for aggression this visit? No  Does pt have a history of Civil Commitment? No  Is Pt their own guardian? Yes    Pt is prescribed medication. Is patient medication compliant? No  Pt denies OP services   CD concerns:  Meth use - BETTY 2 days ago  Acute or chronic medical concerns: Obesity and HTN  Does Pt present with specific needs, assistive devices, or exclusionary criteria? None      Pt is ambulatory  Pt is able to perform ADLs independently      A: Pt to be reviewed for [facility] admission. Pt is Voluntary  Preferred placement: [facility]    COVID Symptoms: No  If yes, COVID test required   Utox: Positive for amphetamines    CMP: N/A  CBC: N/A  HCG: N/A    R: Patient cleared and ready for behavioral bed placement: Yes  Pt placed on IP worklist? Yes    Does Patient need a Transfer Center request created? Yes, writer completed Transfer Center request at: 04:03    No appropriate beds are currently available  within the [facility] system. Bed search update @ 12:15AM:      [facility]: @ cap per website. Per Tomasa @ 00:14, they are full  [facility]: @ cap per website  [facility]: @ cap per website. Per Shayne @ 00:15, they are full  [facility]: @ cap per website  [facility]: @ cap per website  [facility]: @ cap per website  [facility]: @ cap per website  [facility]: @ cap per website  [facility]: @ cap per website    Pt remains on work list until appropriate placement is available

## 2023-08-26 NOTE — CONSULTS
Diagnostic Evaluation Consultation  Crisis Assessment    Patient Name: Chu Pimentel  Age:  37 year old  Legal Sex: male  Gender Identity: male  Pronouns: He/him/his  Race:    Black or   White  Ethnicity: Not  or   Language: English      Patient was assessed: Virtual: [employer] Crisis Assessment Start Time: 0255 Crisis Assessment Stop Time: 0310  Patient location: [facility] EMERGENCY DEPARTMENT                             [id number]    Referral Data and Chief Complaint  Chu Pimentel presents to the ED by  self. Patient is presenting to the ED for the following concerns: Worsening psychosocial stress, Depression, Suicidal ideation, Substance use, Anxiety.   Factors that make the mental health crisis life threatening or complex are:  Patient reports increased psychosocial stress including homelessness and being off his psychiatric medication for two months. He hasn't had his meds because he does not have a primary care provider. Patient is hearing voices telling him to do things such as kill himself. He does not feel safe outside of the hospital. Patient requests placement and would like inpatient psychiatric treatment to restart his medications..      Informed Consent and Assessment Methods  Explained the crisis assessment process, including applicable information disclosures and limits to confidentiality, assessed understanding of the process, and obtained consent to proceed with the assessment.  Assessment methods included conducting a formal interview with patient, review of medical records, collaboration with medical staff, and obtaining relevant collateral information from family and community providers when available.  : done     Patient response to interventions: eager to participate, acceptance expressed, verbalizes understanding  Coping skills were attempted to reduce the crisis:  Patient came to the ED to request help.     History of the Crisis    Previous diagnoses include MDD, CULLEN, and Meth Abuse. He has had inpatient hospitalization. Patient has had QUINCY treatment 15 times with the most recent at [facility]. Patient admitted that he does not take QUINCY treatment seriously.  He reports family history of mental health and substance use disorders.    Brief Psychosocial History  Family:  , Children yes  Support System:  None  Employment Status:  unemployed  Source of Income:  unknown  Financial Environmental Concerns:  unemployed  Current Hobbies:  None identified  Barriers in Personal Life:  mental health concerns, transportation concerns, lack of companionship, financial concerns    Significant Clinical History  Current Anxiety Symptoms:  anxious  Current Depression/Trauma:  thoughts of death/suicide, difficulty concentrating, impaired decision making, irritable  Current Somatic Symptoms:     Current Psychosis/Thought Disturbance:  auditory hallucinations, high risk behavior, impulsive  Current Eating Symptoms:     Chemical Use History:  Alcohol: Social  Benzodiazepines: None  Opiates: None  Cocaine: None  Marijuana: Occasional  Other Use: Methamphetamines  Last Use::  (A few days ago.)   Past diagnosis:  ADHD, Anxiety Disorder, Depression, Substance Use Disorder, PTSD  Family history:  Other (Patient did not provide details but endorsed family history.)  Past treatment:  Psychiatric Medication Management, Primary Care, Residential Treatment, Individual therapy  Details of most recent treatment:  [facility] treatment at [facility].  Other relevant history:          Collateral Information  Is there collateral information: No. A voicemail was left for patient's mother with request for return call.      Collateral information name, relationship, phone number:   Mother, Ninfa Pimentel,[phone]    What happened today:       What is different about patient's functioning:       Concern about alcohol/drug use:      What do you think the patient needs:       Has patient made comments about wanting to kill themselves/others:      If d/c is recommended, can they take part in safety/aftercare planning:       Additional collateral information:   N/A     Risk Assessment  [place] Suicide Severity Rating Scale Full Clinical Version:  Suicidal Ideation  Q6 Suicide Behavior (Lifetime): yes    [place] Suicide Severity Rating Scale Recent: 8/26/2023  Suicidal Ideation (Recent)  Q1 Wished to be Dead (Past Month): yes  Q2 Suicidal Thoughts (Past Month): yes  Q3 Suicidal Thought Method: yes  Q4 Suicidal Intent without Specific Plan: no  Q5 Suicide Intent with Specific Plan: yes  Within the Past 3 Months?: yes  Level of Risk per Screen: high risk  Intensity of Ideation (Recent)  Most Severe Ideation Rating (Past 1 Month): 3  Frequency (Past 1 Month): 2-5 times in week  Duration (Past 1 Month): Less than 1 hour/some of the time  Controllability (Past 1 Month): Can control thoughts with a lot of difficulty  Deterrents (Past 1 Month): Uncertain that deterrents stopped you  Reasons for Ideation (Past 1 Month): Mostly to end or stop the pain (You couldn't go on living with the pain or how you were feeling)  Suicidal Behavior (Recent)  Actual Attempt (Past 3 Months): No  Has subject engaged in non-suicidal self-injurious behavior? (Past 3 Months): No  Interrupted Attempts (Past 3 Months): No  Aborted or Self-Interrupted Attempt (Past 3 Months): No  Preparatory Acts or Behavior (Past 3 Months): No    Environmental or Psychosocial Events: other life stressors, unstable housing, homelessness, unemployment/underemployment  Protective Factors: Protective Factors: reality testing ability, help seeking, able to access care without barriers    Does the patient have thoughts of harming others? Feels Like Hurting Others: no  Previous Attempt to Hurt Others: no  Current presentation: Confused  Violence Threats in Past 6 Months: No  Current Violence Plan or Thoughts: Denied  Is the patient  engaging in sexually inappropriate behavior?: no  Duty to warn initiated: no  Duty to warn details: N/A    Is the patient engaging in sexually inappropriate behavior?  no        Mental Status Exam   Affect: Flat  Appearance: Appropriate  Attention Span/Concentration: Attentive  Eye Contact: Engaged    Fund of Knowledge: Appropriate   Language /Speech Content: Fluent  Language /Speech Volume: Normal  Language /Speech Rate/Productions: Normal  Recent Memory: Intact  Remote Memory: Intact  Mood: Anxious, Irritable  Orientation to Person:     Orientation to Place: Yes  Orientation to Time of Day: Yes  Orientation to Date: Yes     Situation (Do they understand why they are here?): Yes  Psychomotor Behavior: Normal  Thought Content: Suicidal, Hallucinations  Thought Form: Intact        Medication  Psychotropic medications:   Medication Orders - Psychiatric (From admission, onward)      None             Current Care Team  Patient Care Team:  No Ref-Primary, Physician as PCP - General  No Ref-Primary, Physician  Riri Ortiz APRN CNP as Assigned PCP    Diagnosis  Patient Active Problem List   Diagnosis Code    Essential hypertension I10    Anxiety F41.9    Recurrent major depressive disorder (H) F33.9    Migraine without aura G43.009    Tobacco abuse Z72.0    Class 3 severe obesity due to excess calories with serious comorbidity and body mass index (BMI) of 40.0 to 44.9 in adult (H) E66.01, Z68.41    Insomnia due to other mental disorder F51.05, F99    Attention deficit hyperactivity disorder (ADHD), predominantly inattentive type F90.0    CULLEN (generalized anxiety disorder) F41.1    Moderate episode of recurrent major depressive disorder (H) F33.1    Methamphetamine abuse (H) F15.10    Paranoid ideation (H) F22    Trench foot, unspecified laterality, initial encounter T69.029A    At risk for unsafe behavior Z91.89    Infection due to 2019 novel coronavirus U07.1       Primary Problem This Admission  There are no active  hospital problems to display for this patient.    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 He is experiencing auditory command hallucinations telling him to harm himself. He is suicidal with thoughts of jumping off a tree or walking in traffic hoping a car will hit him. Patient would like to restart medications and stabilize psychiatrically. He would benefit from inpatient admission for medication management, safety, and stabilization. He agrees to a voluntary admission.       Imminent risk of harm: Suicidal Behavior  Severe psychiatric, behavioral or other comorbid conditions are appropriate for management at inpatient mental health as indicated by at least one of the following: Psychiatric Symptoms, Comorbid substance use disorder, Impaired impulse control, judgement, or insight  Severe dysfunction in daily living is present as indicated by at least one of the following: Extreme deterioration in social interactions, Complete inability to maintain any appropriate aspect of personal responsibility in any adult roles, Complete withdrawal from all social interactions  Situation and expectations are appropriate for inpatient care: Patient management/treatment at lower level of care is not feasible or is inappropriate  Inpatient mental health services are necessary to meet patient needs and at least one of the following: Specific condition related to admission diagnosis is present and judged likely to deteriorate in absence of treatment at proposed level of care      Patient coping skills attempted to reduce the crisis:  Patient came to the ED to request help.    Disposition  Recommended  disposition: Inpatient Mental Health        Reviewed case and recommendations with attending provider. Attending Name: Dr. Lainez       Attending concurs with disposition: yes       Patient and/or validated legal guardian concurs with disposition:   yes       Final disposition:  inpatient mental health    Legal status on admission: Voluntary/Patient has signed consent for treatment    Assessment Details   Total duration spent on the patient case in minutes: 15 min     CPT code(s) utilized: 14106 - Psychotherapy for Crisis - 60 (30-74*) min    Antonina Birch LP, Psychotherapist  DEC - Triage & Transition Services  Callback: [phone]

## 2023-08-26 NOTE — PHARMACY-ADMISSION MEDICATION HISTORY
Pharmacist Admission Medication History    Admission medication history is complete. The information provided in this note is only as accurate as the sources available at the time of the update.    Medication reconciliation/reorder completed by provider prior to medication history? No    Information Source(s): Patient and CareEverywhere/SureScripts via in-person    Pertinent Information: Patient states he is not currently taking any medications and hasn't taken any in a couple of months, was unable to get refills. He states he was previously taking amlodipine 10 mg daily, bupropion 150 mg daily, citalopram 40 mg daily (although most recent prescription shows 20 mg daily), lisinopril 20 mg daily, as well as adderall and hydroxyzine as needed (unaware of strengths). States he no longer needs atomoxetine, metformin, suboxone, or olanzapine, he does not have narcan on hand.     Changes made to PTA medication list:  Added: All  Deleted: None  Changed: None    Medication Affordability:  Not including over the counter (OTC) medications, was there a time in the past 3 months when you did not take your medications as prescribed because of cost?: Yes    Allergies reviewed with patient and updates made in EHR: yes    Medication History Completed By: SHANEL MCGEE RPH 8/26/2023 7:37 AM    Prior to Admission medications    Not on File

## 2023-08-26 NOTE — TELEPHONE ENCOUNTER
R:  Pato called Intake @ 2:54pm and pt has updated his search area - that he is agreeable to a statewide search for [facility] .   Worklist has been updated.

## 2023-08-26 NOTE — ED NOTES
[facility] ED Mental Health Handoff Note:     Assuming care from: Dr Kevin Lainez    Brief HPI: 37 year old male signed out to me by the above provider. See initial ED Provider note for full details of the presentation.   In brief, patient  reports he has suicidal thoughts and auditory hallucinations that has been ongoing for a few months. States he hears voices that tell him his fingers are going to get cut off if he doesn't do certain things. Notes he has been having thoughts of walking onto the freeway to get hit by a car. He states he has not been taking his medications, Zoloft and Prozac, because he was too scared to take them. He came to the ER today to get some medications and to get help moving forward. He states he wants to get his life back on track. He has been in and out of crisis centers and notes he has been sleeping on [employer] floors. Notes he is achy because he's been wandering around lately. Notes he last used methamphetamines 2 days ago.     Patient otherwise denies fever or vomiting.      Per chart review, patient was seen at [facility] Emergency Department on 7/24/23 for suicidal ideation and plan to jump off a bridge.  DEC  recommended observation in the emergency department with reevaluation to see if patient is able to contract for safety clinically sober. Patient has not been taking his medications for the past 1.5 months and is unable to state which medications he takes at this time Patient found to have a psychiatric condition that would benefit from an observation stay in the emergency department for further psychiatric stabilization and/or coordination of a safe disposition.      Home meds reviewed and ordered/administered: Yes  Medically stable for inpatient mental health admission: Yes.  Evaluated by mental health: Yes. The recommendation is for inpatient mental health treatment. Bed search in process DEC  reinterviewed the patient today but still requires  inpatient treatment for ongoing suicidal ideation and command hallucinations  Safety concerns: At the time I received sign out, there were no safety concerns.    Hold Status:  Active Orders   N/A         Labs/Imaging:   Results for orders placed or performed during the hospital encounter of 08/25/23 (from the past 24 hour(s))   [facility] ([facility]) Assessment Consult Order:     Status: None ()    Collection Time: 08/25/23 11:16 PM    Antonina Munoz LP     8/26/2023  4:44 AM  Diagnostic Evaluation Consultation  Crisis Assessment    Patient Name: Chu Pimentel  Age:  37 year old  Legal Sex: male  Gender Identity: male  Pronouns: He/him/his  Race:    Black or   White  Ethnicity: Not  or   Language: English      Patient was assessed: Virtual: [facility] Crisis Assessment Start   Time: 0255 Crisis Assessment Stop Time: 0310  Patient location: [facility] EMERGENCY   DEPARTMENT                             [id number]    Referral Data and Chief Complaint  Chu Pimentel presents to the ED by  self. Patient is presenting   to the ED for the following concerns: Worsening psychosocial   stress, Depression, Suicidal ideation, Substance use, Anxiety.     Factors that make the mental health crisis life threatening or   complex are:  Patient reports increased psychosocial stress   including homelessness and being off his psychiatric medication   for two months. He hasn't had his meds because he does not have a   primary care provider. Patient is hearing voices telling him to   do things such as kill himself. He does not feel safe outside of   the hospital. Patient requests placement and would like inpatient   psychiatric treatment to restart his medications..      Informed Consent and Assessment Methods  Explained the crisis assessment process, including applicable   information disclosures and limits to confidentiality, assessed   understanding  of the process, and obtained consent to proceed   with the assessment.  Assessment methods included conducting a   formal interview with patient, review of medical records,   collaboration with medical staff, and obtaining relevant   collateral information from family and community providers when   available.  : done     Patient response to interventions: eager to participate,   acceptance expressed, verbalizes understanding  Coping skills were attempted to reduce the crisis:  Patient came   to the ED to request help.     History of the Crisis   Previous diagnoses include MDD, CULLEN, and Meth Abuse. He has had   inpatient hospitalization. Patient has had [facility] treatment 15 times   with the most recent at [facility]. Patient admitted that he   does not take QU[facility]CY treatment seriously.  He reports family history   of mental health and substance use disorders.    Brief Psychosocial History  Family:  , Children yes  Support System:  None  Employment Status:  unemployed  Source of Income:  unknown  Financial Environmental Concerns:  unemployed  Current Hobbies:  None identified  Barriers in Personal Life:  mental health concerns,   transportation concerns, lack of companionship, financial   concerns    Significant Clinical History  Current Anxiety Symptoms:  anxious  Current Depression/Trauma:  thoughts of death/suicide, difficulty   concentrating, impaired decision making, irritable  Current Somatic Symptoms:     Current Psychosis/Thought Disturbance:  auditory hallucinations,   high risk behavior, impulsive  Current Eating Symptoms:     Chemical Use History:  Alcohol: Social  Benzodiazepines: None  Opiates: None  Cocaine: None  Marijuana: Occasional  Other Use: Methamphetamines  Last Use::  (A few days ago.)   Past diagnosis:  ADHD, Anxiety Disorder, Depression, Substance   Use Disorder, PTSD  Family history:  Other (Patient did not provide details but   endorsed family history.)  Past treatment:  Psychiatric  Medication Management, Primary Care,   Residential Treatment, Individual therapy  Details of most recent treatment:  [facility] treatment at [facility].  Other relevant history:          Collateral Information  Is there collateral information: No. A voicemail was left for   patient's mother with request for return call.      Collateral information name, relationship, phone number:     Mother, Ninfa Pimentel,[phone]    What happened today:       What is different about patient's functioning:       Concern about alcohol/drug use:      What do you think the patient needs:      Has patient made comments about wanting to kill   themselves/others:      If d/c is recommended, can they take part in safety/aftercare   planning:       Additional collateral information:   N/A     Risk Assessment  [place] Suicide Severity Rating Scale Full Clinical Version:  Suicidal Ideation  Q6 Suicide Behavior (Lifetime): yes    [place] Suicide Severity Rating Scale Recent: 8/26/2023  Suicidal Ideation (Recent)  Q1 Wished to be Dead (Past Month): yes  Q2 Suicidal Thoughts (Past Month): yes  Q3 Suicidal Thought Method: yes  Q4 Suicidal Intent without Specific Plan: no  Q5 Suicide Intent with Specific Plan: yes  Within the Past 3 Months?: yes  Level of Risk per Screen: high risk  Intensity of Ideation (Recent)  Most Severe Ideation Rating (Past 1 Month): 3  Frequency (Past 1 Month): 2-5 times in week  Duration (Past 1 Month): Less than 1 hour/some of the time  Controllability (Past 1 Month): Can control thoughts with a lot   of difficulty  Deterrents (Past 1 Month): Uncertain that deterrents stopped you  Reasons for Ideation (Past 1 Month): Mostly to end or stop the   pain (You couldn't go on living with the pain or how you were   feeling)  Suicidal Behavior (Recent)  Actual Attempt (Past 3 Months): No  Has subject engaged in non-suicidal self-injurious behavior?   (Past 3 Months): No  Interrupted Attempts (Past 3 Months): No  Aborted or  Self-Interrupted Attempt (Past 3 Months): No  Preparatory Acts or Behavior (Past 3 Months): No    Environmental or Psychosocial Events: other life stressors,   unstable housing, homelessness, unemployment/underemployment  Protective Factors: Protective Factors: reality testing ability,   help seeking, able to access care without barriers    Does the patient have thoughts of harming others? Feels Like   Hurting Others: no  Previous Attempt to Hurt Others: no  Current presentation: Confused  Violence Threats in Past 6 Months: No  Current Violence Plan or Thoughts: Denied  Is the patient engaging in sexually inappropriate behavior?: no  Duty to warn initiated: no  Duty to warn details: N/A    Is the patient engaging in sexually inappropriate behavior?  no          Mental Status Exam   Affect: Flat  Appearance: Appropriate  Attention Span/Concentration: Attentive  Eye Contact: Engaged    Fund of Knowledge: Appropriate   Language /Speech Content: Fluent  Language /Speech Volume: Normal  Language /Speech Rate/Productions: Normal  Recent Memory: Intact  Remote Memory: Intact  Mood: Anxious, Irritable  Orientation to Person:     Orientation to Place: Yes  Orientation to Time of Day: Yes  Orientation to Date: Yes     Situation (Do they understand why they are here?): Yes  Psychomotor Behavior: Normal  Thought Content: Suicidal, Hallucinations  Thought Form: Intact        Medication  Psychotropic medications:   Medication Orders - Psychiatric (From admission, onward)      None             Current Care Team  Patient Care Team:  No Ref-Primary, Physician as PCP - General  No Ref-Primary, Physician  Riri Ortiz APRN CNP as Assigned PCP    Diagnosis  Patient Active Problem List   Diagnosis Code    Essential hypertension I10    Anxiety F41.9    Recurrent major depressive disorder (H) F33.9    Migraine without aura G43.009    Tobacco abuse Z72.0    Class 3 severe obesity due to excess calories with serious   comorbidity and  body mass index (BMI) of 40.0 to 44.9 in adult   (H) E66.01, Z68.41    Insomnia due to other mental disorder F51.05, F99    Attention deficit hyperactivity disorder (ADHD), predominantly   inattentive type F90.0    CULLEN (generalized anxiety disorder) F41.1    Moderate episode of recurrent major depressive disorder (H)   F33.1    Methamphetamine abuse (H) F15.10    Paranoid ideation (H) F22    Trench foot, unspecified laterality, initial encounter T69.029A    At risk for unsafe behavior Z91.89    Infection due to 2019 novel coronavirus U07.1       Primary Problem This Admission  There are no active hospital problems to display for this   patient.    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 He is experiencing auditory   command hallucinations telling him to harm himself. He is   suicidal with thoughts of jumping off a tree or walking in   traffic hoping a car will hit him. Patient would like to restart   medications and stabilize psychiatrically. He would benefit from   inpatient admission for medication management, safety, and   stabilization. He agrees to a voluntary admission.       Imminent risk of harm: Suicidal Behavior  Severe psychiatric, behavioral or other comorbid conditions are   appropriate for management at inpatient mental health as   indicated by at least one of the following: Psychiatric Symptoms,   Comorbid substance use disorder, Impaired impulse control,   judgement, or insight  Severe dysfunction in daily living is present as indicated by at   least one of the following: Extreme deterioration in social   interactions, Complete inability to maintain any appropriate    aspect of personal responsibility in any adult roles, Complete   withdrawal from all social interactions  Situation and expectations are appropriate for inpatient care:   Patient management/treatment at lower level of care is not   feasible or is inappropriate  Inpatient mental health services are necessary to meet patient   needs and at least one of the following: Specific condition   related to admission diagnosis is present and judged likely to   deteriorate in absence of treatment at proposed level of care      Patient coping skills attempted to reduce the crisis:  Patient   came to the ED to request help.    Disposition  Recommended disposition: Inpatient Mental Health        Reviewed case and recommendations with attending provider.   Attending Name: Dr. Lainez       Attending concurs with disposition: yes       Patient and/or validated legal guardian concurs with disposition:     yes       Final disposition:  inpatient mental health    Legal status on admission: Voluntary/Patient has signed consent   for treatment    Assessment Details   Total duration spent on the patient case in minutes: 15 min     CPT code(s) utilized: 90192 - Psychotherapy for Crisis - 60   (30-74*) min    Antonina Birch LP, Psychotherapist  DEC - Triage & Transition Services  Callback: [phone]           Urine Drugs of Abuse Screen     Status: Abnormal    Collection Time: 08/25/23 11:26 PM    Narrative    The following orders were created for panel order Urine Drugs of Abuse Screen.  Procedure                               Abnormality         Status                     ---------                               -----------         ------                     Drug abuse screen 77 uri...[[id number]]  Abnormal            Final result                 Please view results for these tests on the individual orders.   Drug abuse screen 77 urine (FL, RH, SH)     Status: Abnormal    Collection Time: 08/25/23 11:26 PM   Result Value Ref Range     Amphetamines Urine Screen Positive (A) Screen Negative    Barbituates Urine Screen Negative Screen Negative    Benzodiazepine Urine Screen Negative Screen Negative    Cannabinoids Urine Screen Negative Screen Negative    Opiates Urine Screen Negative Screen Negative    PCP Urine Screen Negative Screen Negative    Cocaine Urine Screen Negative Screen Negative   Asymptomatic COVID-19 Virus (Coronavirus) by PCR Nasopharyngeal     Status: Normal    Collection Time: 08/25/23 11:27 PM    Specimen: Nasopharyngeal; Swab   Result Value Ref Range    SARS CoV2 PCR Negative Negative    Narrative    Testing was performed using the Xpert Xpress SARS-CoV-2 Assay on the Cepheid Gene-Xpert Instrument Systems. Additional information about this Emergency Use Authorization (EUA) assay can be found via the Lab Guide. This test should be ordered for the detection of SARS-CoV-2 in individuals who meet SARS-CoV-2 clinical and/or epidemiological criteria as well as from individuals without symptoms or other reasons to suspect COVID-19. Test performance for asymptomatic patients has only been established in anterior nasal swab specimens. This test is for in vitro diagnostic use under the FDA EUA for laboratories certified under CLIA to perform high complexity testing. This test has not been FDA cleared or approved. A negative result does not rule out the presence of PCR inhibitors in the specimen or target RNA concentration below the limit of detection for the assay. The possibility of a false negative should be considered if the patient's recent exposure or clinical presentation suggests COVID-19.. This test was validated by the [facility]. This laboratory is certified under the Clinical Laboratory Improvement Amendments (CLIA) as qualified to perform high complexity laboratory testing.           ED Meds:  Medications   amLODIPine (NORVASC) tablet 10 mg (has no administration in time range)   citalopram  (celeXA) tablet 40 mg (has no administration in time range)   lisinopril (ZESTRIL) tablet 30 mg (has no administration in time range)   OLANZapine (zyPREXA) tablet 5 mg (has no administration in time range)   OLANZapine (zyPREXA) tablet 5 mg (5 mg Oral $Given 8/25/23 2326)   lisinopril (ZESTRIL) tablet 30 mg (30 mg Oral Not Given 8/26/23 0300)   amLODIPine (NORVASC) tablet 10 mg (10 mg Oral Not Given 8/26/23 0300)   citalopram (celeXA) tablet 40 mg (40 mg Oral $Given 8/26/23 0048)     No orders to display       ED Course:  7:53 AM I met with the patient, obtained history, performed an initial exam, and discussed options and plan for diagnostics and treatment here in the ED.        There were no significant events during my shift.    Patient was signed out to the oncoming provider, Dr. Ramses Duque at shift change    Impression:    ICD-10-CM    1. Severe episode of recurrent major depressive disorder, with psychotic features (H)  F33.3       2. Drug abuse (H)  F19.10           Plan:    Awaiting inpatient mental health admission/transfer.    JORGE Aguirre.O.  [facility] EMERGENCY DEPARTMENT  [address] [place] [id number] [phone]                     Dru Trimble,   08/26/23 4588

## 2023-08-26 NOTE — PROGRESS NOTES
"Triage & Transition Services, Extended Care     Therapy Progress Note    Patient: Chu goes by \"Chu,\" uses he/him pronouns  Date of Service: August 26, 2023  Site of Service: [facility] ED  Patient was seen virtually (Fast[employer] cart or other teleconferencing device).     Presenting problem:   Chu is followed related to Placement delay: boarding for IP MH placement . Please see initial DEC/LMHP Crisis Assessment completed by Dede Birch on 8/26/23 for complete assessment information. Notable concerns include SI with plan, auditory hallucinations.     Individuals Present: Chu & Pato Velazquez    Session start: 1:39 PM  Session end: 1:59 PM  Session duration in minutes: 20  Session number: 1  Anticipated number of sessions or this episode of care: 1-4  CPT utilized: 34605 - Psychotherapy (with patient) - 30 (16-37*) min    Current Presentation:   Writer and patient met virtually.  Initially patient was quite guarded, however after talking with writer for but he opened up more.  Patient continues to report auditory hallucinations, and suicidal thoughts.  He states his auditory hallucinations have been present for about 6 months, but his suicidal thoughts have been more recent than that.  Patient reports that he no longer has any desire to do substances, but knows he needs some help.  writer attempted to talk with patient about coping skills that he has utilized in the past, and patient reports that lately nothing has seemed to help.  He states in the past he enjoyed spending time with his children, and going for walks, watching movies.  Writer attempted to talk with patient about these further however patient was guarded when it came to talk about his children.  Writer validated that certain things can be difficult to talk about, and moved on to other things that patient used to enjoy doing.  He states that he has been struggling for quite some time, it is hard to remember what feeling good feels like.  He states that " he has been walking and trying to spend time around others, or in charge, but states this has been harder recently.  Additionally patient reports that in the past he feels that he is only ran from his problems rather than dealing with them.  Patient continues to report that he feels he would act on his suicidal thoughts if he were to leave the hospital.  Throughout the conversation patient is alert and oriented, however at times appears somewhat paranoid, and is observed to be looking around the room often.     Mental Status Exam:   Appearance: awake  Attitude: guarded  Eye Contact: looking around room  Mood: anxious  Affect: mood congruent  Speech: clear, coherent  Psychomotor Behavior: no evidence of tardive dyskinesia, dystonia, or tics  Thought Process:  linear  Associations: no loose associations  Thought Content: plan for suicide present and auditory hallucinations present  Insight: fair  Judgement: fair  Oriented to: time, person, and place  Attention Span and Concentration: fair  Recent and Remote Memory: fair    Diagnosis:   Major depressive disorder, Recurrent episode, Moderate     F33.1                             Generalized anxiety disorder  F41.1                             Other stimulant use, unspecified with stimulant-induced psychotic disorder, 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 Engaged in cognitive restructuring/ reframing, looked at common cognitive distortions and challenged negative thoughts. Engaged in guided discovery, explored patient's perspectives and helped expand them through socratic dialogue.    Treatment Objective(s) Addressed:   The focus of this session was on rapport building and orienting the patient to therapy.     Progress Towards Goals:   Patient reports  no change in  symptoms. Patient is making progress towards treatment goals as evidenced by engaging in video session.      General Recommendations:   Continue to monitor for harm. Consider: Use a positive, direct and calm approach. Pt's tend to match the energy/mood of the staff. Keep focus positive and upbeat    Plan:   Inpatient Mental Health: Patient continues to endorse auditory hallucinations and plan for suicide.  Patient is voluntarily seeking inpatient mental health placement.    Plan for Care reviewed with Assigned Medical Provider? Yes. Provider, Dr Trimble, response: acknowledges       Pato Velazquez   Licensed Mental Health Professional (LMHP), [facility]  [phone]

## 2023-08-26 NOTE — ED NOTES
DEC  called pt's emergency contacts.  Unable to leave a VM for his father Axel ([phone]).  Writer left a VM with his mother, Ninfa ([phone]) requesting a call back.

## 2023-08-26 NOTE — ED TRIAGE NOTES
Chu presents to triage with complaints of hearing voices telling to hurt himself or that he will get hurt if they don't do what he tells them, suicidal thoughts jumping off a tree, walking around on a highway hoping a car would hit him. Reports being homeless and being off his meds.    Reports doing meth 2 days ago.     Triage Assessment       Row Name 08/25/23 0280       Triage Assessment (Adult)    Airway WDL WDL       Respiratory WDL    Respiratory WDL WDL       Skin Circulation/Temperature WDL    Skin Circulation/Temperature WDL WDL       Cardiac WDL    Cardiac WDL WDL       Peripheral/Neurovascular WDL    Peripheral Neurovascular WDL WDL       Cognitive/Neuro/Behavioral WDL    Cognitive/Neuro/Behavioral WDL WDL

## 2023-08-26 NOTE — ED NOTES
[facility] ED Mental Health Handoff Note:     Assuming care from: Dr Dru Trimble    Brief HPI: 37 year old male signed out to me by the above provider. See initial ED Provider note for full details of the presentation.   In brief, patient patient reports he has suicidal thoughts and auditory hallucinations that has been ongoing for a few months. States he hears voices that tell him his fingers are going to get cut off if he doesn't do certain things. Notes he has been having thoughts of walking onto the freeway to get hit by a car. He states he has not been taking his medications, Zoloft and Prozac, because he was too scared to take them. He came to the ER to get medications and help moving forward. He states he wants to get his life back on track. He has been in and out of crisis centers and notes he has been sleeping on Yaz[place] floors. Notes he is achy because he's been wandering around lately. Notes he last used methamphetamines 2 days ago.     Patient otherwise denies fever or vomiting.       Per chart review, patient was seen at [facility] Emergency Department on 7/24/23 for suicidal ideation and plan to jump off a bridge.  DEC  recommended observation in the emergency department with reevaluation to see if patient is able to contract for safety clinically sober. Patient has not been taking his medications for the past 1.5 months and is unable to state which medications he takes at this time Patient found to have a psychiatric condition that would benefit from an observation stay in the emergency department for further psychiatric stabilization and/or coordination of a safe disposition.      Home meds reviewed and ordered/administered: Yes  Medically stable for inpatient mental health admission: Yes.  Evaluated by mental health: Yes. The recommendation is for inpatient mental health treatment. Bed search in process  Safety concerns: At the time I received sign out, there were no safety  concerns.    Hold Status:  Active Orders   N/A         Labs/Imaging:   Results for orders placed or performed during the hospital encounter of 08/25/23 (from the past 24 hour(s))   [facility] (DEC) Assessment Consult Order:     Status: None ()    Collection Time: 08/25/23 11:16 PM    Antonina Munoz LP     8/26/2023  4:44 AM  Diagnostic Evaluation Consultation  Crisis Assessment    Patient Name: Chu Pimentel  Age:  37 year old  Legal Sex: male  Gender Identity: male  Pronouns: He/him/his  Race:    Black or   White  Ethnicity: Not  or   Language: English      Patient was assessed: Virtual: [employer] Crisis Assessment Start   Time: 0255 Crisis Assessment Stop Time: 0310  Patient location: [facility] EMERGENCY   DEPARTMENT                             [id number]    Referral Data and Chief Complaint  Chu Pimentel presents to the ED by  self. Patient is presenting   to the ED for the following concerns: Worsening psychosocial   stress, Depression, Suicidal ideation, Substance use, Anxiety.     Factors that make the mental health crisis life threatening or   complex are:  Patient reports increased psychosocial stress   including homelessness and being off his psychiatric medication   for two months. He hasn't had his meds because he does not have a   primary care provider. Patient is hearing voices telling him to   do things such as kill himself. He does not feel safe outside of   the hospital. Patient requests placement and would like inpatient   psychiatric treatment to restart his medications..      Informed Consent and Assessment Methods  Explained the crisis assessment process, including applicable   information disclosures and limits to confidentiality, assessed   understanding of the process, and obtained consent to proceed   with the assessment.  Assessment methods included conducting a   formal interview with patient,  review of medical records,   collaboration with medical staff, and obtaining relevant   collateral information from family and community providers when   available.  : done     Patient response to interventions: eager to participate,   acceptance expressed, verbalizes understanding  Coping skills were attempted to reduce the crisis:  Patient came   to the ED to request help.     History of the Crisis   Previous diagnoses include MDD, CULLEN, and Meth Abuse. He has had   inpatient hospitalization. Patient has had QUINCY treatment 15 times   with the most recent at [facility]. Patient admitted that he   does not take QUINCY treatment seriously.  He reports family history   of mental health and substance use disorders.    Brief Psychosocial History  Family:  , Children yes  Support System:  None  Employment Status:  unemployed  Source of Income:  unknown  Financial Environmental Concerns:  unemployed  Current Hobbies:  None identified  Barriers in Personal Life:  mental health concerns,   transportation concerns, lack of companionship, financial   concerns    Significant Clinical History  Current Anxiety Symptoms:  anxious  Current Depression/Trauma:  thoughts of death/suicide, difficulty   concentrating, impaired decision making, irritable  Current Somatic Symptoms:     Current Psychosis/Thought Disturbance:  auditory hallucinations,   high risk behavior, impulsive  Current Eating Symptoms:     Chemical Use History:  Alcohol: Social  Benzodiazepines: None  Opiates: None  Cocaine: None  Marijuana: Occasional  Other Use: Methamphetamines  Last Use::  (A few days ago.)   Past diagnosis:  ADHD, Anxiety Disorder, Depression, Substance   Use Disorder, PTSD  Family history:  Other (Patient did not provide details but   endorsed family history.)  Past treatment:  Psychiatric Medication Management, Primary Care,   Residential Treatment, Individual therapy  Details of most recent treatment:  [facility] treatment at [facility]    [place].  Other relevant history:          Collateral Information  Is there collateral information: No. A voicemail was left for   patient's mother with request for return call.      Collateral information name, relationship, phone number:     Mother, Ninfa Pimentel,[phone]    What happened today:       What is different about patient's functioning:       Concern about alcohol/drug use:      What do you think the patient needs:      Has patient made comments about wanting to kill   themselves/others:      If d/c is recommended, can they take part in safety/aftercare   planning:       Additional collateral information:   N/A     Risk Assessment  [place] Suicide Severity Rating Scale Full Clinical Version:  Suicidal Ideation  Q6 Suicide Behavior (Lifetime): yes    [place] Suicide Severity Rating Scale Recent: 8/26/2023  Suicidal Ideation (Recent)  Q1 Wished to be Dead (Past Month): yes  Q2 Suicidal Thoughts (Past Month): yes  Q3 Suicidal Thought Method: yes  Q4 Suicidal Intent without Specific Plan: no  Q5 Suicide Intent with Specific Plan: yes  Within the Past 3 Months?: yes  Level of Risk per Screen: high risk  Intensity of Ideation (Recent)  Most Severe Ideation Rating (Past 1 Month): 3  Frequency (Past 1 Month): 2-5 times in week  Duration (Past 1 Month): Less than 1 hour/some of the time  Controllability (Past 1 Month): Can control thoughts with a lot   of difficulty  Deterrents (Past 1 Month): Uncertain that deterrents stopped you  Reasons for Ideation (Past 1 Month): Mostly to end or stop the   pain (You couldn't go on living with the pain or how you were   feeling)  Suicidal Behavior (Recent)  Actual Attempt (Past 3 Months): No  Has subject engaged in non-suicidal self-injurious behavior?   (Past 3 Months): No  Interrupted Attempts (Past 3 Months): No  Aborted or Self-Interrupted Attempt (Past 3 Months): No  Preparatory Acts or Behavior (Past 3 Months): No    Environmental or Psychosocial Events: other  life stressors,   unstable housing, homelessness, unemployment/underemployment  Protective Factors: Protective Factors: reality testing ability,   help seeking, able to access care without barriers    Does the patient have thoughts of harming others? Feels Like   Hurting Others: no  Previous Attempt to Hurt Others: no  Current presentation: Confused  Violence Threats in Past 6 Months: No  Current Violence Plan or Thoughts: Denied  Is the patient engaging in sexually inappropriate behavior?: no  Duty to warn initiated: no  Duty to warn details: N/A    Is the patient engaging in sexually inappropriate behavior?  no          Mental Status Exam   Affect: Flat  Appearance: Appropriate  Attention Span/Concentration: Attentive  Eye Contact: Engaged    Fund of Knowledge: Appropriate   Language /Speech Content: Fluent  Language /Speech Volume: Normal  Language /Speech Rate/Productions: Normal  Recent Memory: Intact  Remote Memory: Intact  Mood: Anxious, Irritable  Orientation to Person:     Orientation to Place: Yes  Orientation to Time of Day: Yes  Orientation to Date: Yes     Situation (Do they understand why they are here?): Yes  Psychomotor Behavior: Normal  Thought Content: Suicidal, Hallucinations  Thought Form: Intact        Medication  Psychotropic medications:   Medication Orders - Psychiatric (From admission, onward)      None             Current Care Team  Patient Care Team:  No Ref-Primary, Physician as PCP - General  No Ref-Primary, Physician  Riri Ortiz APRN CNP as Assigned PCP    Diagnosis  Patient Active Problem List   Diagnosis Code    Essential hypertension I10    Anxiety F41.9    Recurrent major depressive disorder (H) F33.9    Migraine without aura G43.009    Tobacco abuse Z72.0    Class 3 severe obesity due to excess calories with serious   comorbidity and body mass index (BMI) of 40.0 to 44.9 in adult   (H) E66.01, Z68.41    Insomnia due to other mental disorder F51.05, F99    Attention deficit  hyperactivity disorder (ADHD), predominantly   inattentive type F90.0    CULLEN (generalized anxiety disorder) F41.1    Moderate episode of recurrent major depressive disorder (H)   F33.1    Methamphetamine abuse (H) F15.10    Paranoid ideation (H) F22    Trench foot, unspecified laterality, initial encounter T69.029A    At risk for unsafe behavior Z91.89    Infection due to 2019 novel coronavirus U07.1       Primary Problem This Admission  There are no active hospital problems to display for this   patient.    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 He is experiencing auditory   command hallucinations telling him to harm himself. He is   suicidal with thoughts of jumping off a tree or walking in   traffic hoping a car will hit him. Patient would like to restart   medications and stabilize psychiatrically. He would benefit from   inpatient admission for medication management, safety, and   stabilization. He agrees to a voluntary admission.       Imminent risk of harm: Suicidal Behavior  Severe psychiatric, behavioral or other comorbid conditions are   appropriate for management at inpatient mental health as   indicated by at least one of the following: Psychiatric Symptoms,   Comorbid substance use disorder, Impaired impulse control,   judgement, or insight  Severe dysfunction in daily living is present as indicated by at   least one of the following: Extreme deterioration in social   interactions, Complete inability to maintain any appropriate   aspect of personal responsibility in any adult roles, Complete   withdrawal from all social interactions  Situation and expectations are  appropriate for inpatient care:   Patient management/treatment at lower level of care is not   feasible or is inappropriate  Inpatient mental health services are necessary to meet patient   needs and at least one of the following: Specific condition   related to admission diagnosis is present and judged likely to   deteriorate in absence of treatment at proposed level of care      Patient coping skills attempted to reduce the crisis:  Patient   came to the ED to request help.    Disposition  Recommended disposition: Inpatient Mental Health        Reviewed case and recommendations with attending provider.   Attending Name: Dr. Lainez       Attending concurs with disposition: yes       Patient and/or validated legal guardian concurs with disposition:     yes       Final disposition:  inpatient mental health    Legal status on admission: Voluntary/Patient has signed consent   for treatment    Assessment Details   Total duration spent on the patient case in minutes: 15 min     CPT code(s) utilized: 09589 - Psychotherapy for Crisis - 60   (30-74*) min    Antonina Birch LP, Psychotherapist  DEC - Triage & Transition Services  Callback: [phone]           Urine Drugs of Abuse Screen     Status: Abnormal    Collection Time: 08/25/23 11:26 PM    Narrative    The following orders were created for panel order Urine Drugs of Abuse Screen.  Procedure                               Abnormality         Status                     ---------                               -----------         ------                     Drug abuse screen 77 uri...[[id number]]  Abnormal            Final result                 Please view results for these tests on the individual orders.   Drug abuse screen 77 urine (FL, RH, SH)     Status: Abnormal    Collection Time: 08/25/23 11:26 PM   Result Value Ref Range    Amphetamines Urine Screen Positive (A) Screen Negative    Barbituates Urine Screen Negative Screen Negative    Benzodiazepine Urine Screen  Negative Screen Negative    Cannabinoids Urine Screen Negative Screen Negative    Opiates Urine Screen Negative Screen Negative    PCP Urine Screen Negative Screen Negative    Cocaine Urine Screen Negative Screen Negative   Asymptomatic COVID-19 Virus (Coronavirus) by PCR Nasopharyngeal     Status: Normal    Collection Time: 08/25/23 11:27 PM    Specimen: Nasopharyngeal; Swab   Result Value Ref Range    SARS CoV2 PCR Negative Negative    Narrative    Testing was performed using the Xpert Xpress SARS-CoV-2 Assay on the Cepheid Gene-Xpert Instrument Systems. Additional information about this Emergency Use Authorization (EUA) assay can be found via the Lab Guide. This test should be ordered for the detection of SARS-CoV-2 in individuals who meet SARS-CoV-2 clinical and/or epidemiological criteria as well as from individuals without symptoms or other reasons to suspect COVID-19. Test performance for asymptomatic patients has only been established in anterior nasal swab specimens. This test is for in vitro diagnostic use under the FDA EUA for laboratories certified under CLIA to perform high complexity testing. This test has not been FDA cleared or approved. A negative result does not rule out the presence of PCR inhibitors in the specimen or target RNA concentration below the limit of detection for the assay. The possibility of a false negative should be considered if the patient's recent exposure or clinical presentation suggests COVID-19.. This test was validated by the [facility]. This laboratory is certified under the Clinical Laboratory Improvement Amendments (CLIA) as qualified to perform high complexity laboratory testing.           ED Meds:  Medications   citalopram (celeXA) tablet 40 mg (has no administration in time range)   OLANZapine (zyPREXA) tablet 5 mg (has no administration in time range)   lisinopril (ZESTRIL) tablet 20 mg (has no administration in time range)    OLANZapine (zyPREXA) tablet 5 mg (5 mg Oral $Given 8/25/23 2326)   lisinopril (ZESTRIL) tablet 30 mg (30 mg Oral Not Given 8/26/23 0300)   amLODIPine (NORVASC) tablet 10 mg (10 mg Oral Not Given 8/26/23 0300)   citalopram (celeXA) tablet 40 mg (40 mg Oral $Given 8/26/23 0048)     No orders to display       ED Course:     3:15 PM Pt signed out to from Dr. Trimble.  BH awaiting transfer, vol/holdable.  Pt with a history of substance abused, arrived suicidal, medically cleared already.   10:48 PM I was told that [facility] reportedly may have a bed, but requires CBC, CMP, UA, Alcohol level, salicylate level, apap level.  Orders placed.  Pt will be signed out to Dr. Lainez pending all of this.      There were no significant events during my shift.    Patient was signed out to the oncoming provider, Dr. Kevin Lainez at shift change    Impression:    ICD-10-CM    1. Severe episode of recurrent major depressive disorder, with psychotic features (H)  F33.3       2. Drug abuse (H)  F19.10           Plan:    Awaiting inpatient mental health admission/transfer.    Manuel Duque [facility] EMERGENCY DEPARTMENT  [address] [place] [id number] [phone]                     Manuel Duque MD  08/26/23 0768

## 2023-08-26 NOTE — TELEPHONE ENCOUNTER
R:  Pt presents with hallucinations.   No currentl bed availability for review within [facility].   Bed search initiated  [facility] is posting 0 beds. [phone]  [facility] is posting 0 beds. [phone] 8:20am- Per Carissa, they are capped today.   [facility] is posting 0 beds. [phone]  8:25am- Per [facility] is posting 0 beds. [phone] 7:20am- Per Carissa, they are capped today.  [facility] is posting 0 bed. [phone]  8:30am- Per [facility] No Beds  [facility] posting 0 Beds.  Beds will open in October.  [facility] is posting 0 beds. [phone] 8:35am- Per Carissa, they are capped today.  [facility] is posting 0 beds. [phone] 8:45am- Per Carissa, they are capped today.  [facility] is posting 0 beds.    9:00amam- Per Carissa, they are capped today.    Pt remains on Adult worklist pending bed availability.

## 2023-08-27 ENCOUNTER — TELEPHONE (OUTPATIENT)
Dept: BEHAVIORAL HEALTH | Facility: CLINIC | Age: 38
End: 2023-08-27
Payer: MEDICAID

## 2023-08-27 LAB
APAP SERPL-MCNC: <5 UG/ML (ref 10–30)
SALICYLATES SERPL-MCNC: <0.3 MG/DL

## 2023-08-27 PROCEDURE — 250N000013 HC RX MED GY IP 250 OP 250 PS 637: Performed by: EMERGENCY MEDICINE

## 2023-08-27 RX ADMIN — LISINOPRIL 20 MG: 20 TABLET ORAL at 08:09

## 2023-08-27 RX ADMIN — CITALOPRAM HYDROBROMIDE 40 MG: 40 TABLET ORAL at 08:09

## 2023-08-27 RX ADMIN — OLANZAPINE 5 MG: 5 TABLET, FILM COATED ORAL at 10:08

## 2023-08-27 ASSESSMENT — ACTIVITIES OF DAILY LIVING (ADL)
ADLS_ACUITY_SCORE: 35

## 2023-08-27 NOTE — ED NOTES
IP MH Referral Acuity Rating Score (RARS)    LMHP complete at referral to IP MH, with DEC; and, daily while awaiting IP MH placement. Call score to PPS.  CRITERIA SCORING   New 74 HH and Involuntary for IP MH (not adolescent) 0/1   Boarding over 24 hours 1/1   Vulnerable adult at least 55+ with multiple co morbidities; or, Patient age 11 or under 0/1   Suicide ideation without relief of precipitating factors 1/1   Current plan for suicide 1/1   Current plan for homicide 0/1   Imminent risk or actual attempt to seriously harm another without relief of factors precipitating the attempt 0/1   Severe dysfunction in daily living (ex: complete neglect for self care, extreme disruption in vegetative function, extreme deterioration in social interactions) 1/1   Recent (last 2 weeks) or current physical aggression in the ED 0/1   Restraints or seclusion episode in ED 0/1   Verbal aggression, agitation, yelling, etc., while in the ED 0/1   Active psychosis with psychomotor agitation or catatonia 0/1   Need for constant or near constant redirection (from leaving, from others, etc).  0/1   Intrusive or disruptive behaviors 0/1   TOTAL Acuity Total Score: 4

## 2023-08-27 NOTE — PROGRESS NOTES
Triage & Transition Services, Extended Care     Chu Pimentel  August 27, 2023    Chu is followed related to Placement delay: boarding for IP MH placement . Please see initial DEC Crisis Assessment completed for complete assessment information. Medical record is reviewed. While patient is in the ED, care team is working towards Learn and Demonstrate at [employer]ill Focused on Crisis Stabilization.    There are not significant status changes.     Writer spoke to RN Louisa who had recently given patient medications due to ongoing AH. Due to this patient was sleeping. She reports patient was having increased paranoia thinking the government was after him.    Writer attempted to call back to meet with patient again; but was informed patient was declining everything requesting to sleep. He was continuing to show signs of paranoia, and auditory hallucinations.    Plan:  Inpatient Mental Health: Patient continues to have ongoing AH and SI. Writer continues to recommend IP MH placement, and patient continues to be voluntary at this time. A psych consult to review medications was ordered.    Plan for Care reviewed with Assigned Medical Provider? Yes. Provider, Dr Trimble, response: acknowledges.    Extended Care will follow and meet with patient/family/care team as able or requested.     Pato Velazquez  [facility], Extended Care   [phone]

## 2023-08-27 NOTE — TELEPHONE ENCOUNTER
R:  Pt is Voluntary.  No beds avaible within [facility]  Bed Search initiated @ 9am    Bed Search initiated @ 9:00am    [facility] is posting 0 beds.    [facility] is posting 0 beds. [phone]; per call at 9:08 am to Elise, they are at cap.   [facility] is posting 0 beds. [phone];    [facility] is posting 0 beds. [phone]; per call at 9:10 am to Mee, they are at cap for the day.   [facility] is posting 0 beds. [phone]     [facility] is posting 0 beds. [phone]     [facility] is posting 0 beds. [phone]     [facility] is posting 0 beds. [phone]        Pt remains on work list pending appropriate bed availability

## 2023-08-27 NOTE — TELEPHONE ENCOUNTER
Per PPS handoff and chart, [facility] agreed to review pending resulted labs    Called [facility] @ 00:19 and Peg is able to review for [place]. Faxed all clinical @ 00:26    [facility] (Peg) called back @ 02:09 and pt was declined d/t meth use and hx of first degree damage to property charge - meets exclusionary for [facility]    No appropriate beds are currently available within the  system. Bed search update @ 12AM:       [facility]: @ cap per website  [facility]: @ cap per website  [facility]: @ cap per website. Per Juancho @ 23:59 they do not have beds but suggests calling back after 08:30  [facility]: @ cap per website  [facility]: @ cap per website  [facility]: @ cap per website  [facility]: @ cap per website  [facility]: @ cap per website  [facility]: @ cap per website  [facility]: Posting 3 beds. Mixed unit/Low acuity only. Per Tejas @ 02:14 they are reviewing multiple referrals but will call PPS back if beds are still available to be reviewed  [facility]: @ cap per website  [facility]: @ cap per website  [facility]: Posting 1 bed. Called @ 00:01 and was on hold for 8+ minutes. Will try back later. Called again @ 01:25 and was able to leave VM - awaiting callback re: bed availability   [facility]: Does not review after 10PM.    [facility]: Posting beds in [place], [place] and [place]. Declined 8/27 d/t meth use and hx of first degree damage to property charge - meets exclusionary for [facility] system  [facility]: Posting 2 beds (No hx of aggression. No sexual offenders. Voluntary patients only). Per Yulissa @ 00:30, she is reviewing to capacity - PPS can try again later to see if beds are still available  [facility]: Posting 6 beds (Low acuity only. Must have the cognitive ability to do programming. No aggressive or violent behavior or recent HX in the last 2 yrs. MH must be primary). Per Joni @ 02:16, he is able to review after  screening questions. Faxed clinical @ 02:29  [facility]: @ cap per website  [facility]: @ cap per website. Low acuity, Neg Covid. Per Vika Ac @ 00:14, they are full  [facility]: Posting 1 bed (Covid neg. Voluntary only. Mixed unit. No aggressive or violent behavior. No registered sex offenders). Per call @ 03:40, CRN is unavailable but will pass along message to call PPS back re: possible review. Zoila called back @ 04:20 and is able to review. Faxed clinical @ 04:23  [facility]: @ cap per website (No hx of aggression/assault. No lines, drains or tubes. Does not provide detox or CD treatment). Per Ramesh @ 00:15, they will not have any beds until Monday at the [facility]: Posting 6 beds. Facility is in [place]. Pt requests statewide search at this time  [facility]: Posting 4 beds (Mixed unit/Low acuity). Per call @ 00:16, CRN walked away from desk but will call PPS back - CB number provided. Per Sadie @ 00:53 they do not have an appropriate bed available    [facility] (Joni) called @ 03:39 to report provider declines pt d/t believing CD use is primary concern versus [facility] (Zoila) called @ 04:37 to report pt declined d/t active warrants and has felony re: property damage - pt would not be appropriate    Pt remains on work list until appropriate placement is available

## 2023-08-27 NOTE — TELEPHONE ENCOUNTER
R:  No appropriate [facility] beds available.    [facility] Bed Call Log 8/26/2023 @3:28 PM         Intake has called facilities that have not updated their bed status within the last 12 hours.      Adults:         [facility] is posting 0 beds.     [facility] is posting 0 beds. [phone] 8:30AM- Per Salazar they are capped today.   [facility] is posting 0 beds. [phone] 7:20am- Per Carissa, they are capped today.    [facility] is posting 0 beds. [phone]  8:30AM- at capacity.    [facility] is posting 0 beds. [phone] 7:20am- Per Carissa, they are capped today.   [facility] is posting 0 bed. [phone]     [facility] posting 0 Beds.  Beds will open in October.   [facility] is posting 0 beds. [phone] 7:20am- Per Carissa, they are capped today.   [facility] is posting 0 beds. [phone] 7:20am- Per Carissa, they are capped today.   [facility] is posting 0 beds. [phone]  7:20am- Per Carissa, they are capped today.   [facility] is posting 0 beds.    7:20am- Per Carissa, they are capped today.       [facility] is posting 0 beds. [phone] 7:20am- Per Carissa, they are capped today.   [facility] is posting 2 beds. Mixed unit 12+. Low acuity only.  [phone] 7:43am- they are expecting a few discharges.     [facility] is posting 0 beds. No aggression.  [phone] 7:20am- Per Carissa, they are capped today.    [facility] is posting 0 beds. [phone] [id number] 7:44am-Capped.   [facility] is posting 2 beds. [phone]  7:45AM- Per Zlueima, they have a few beds.    [facility] is posting 1 bed. [phone]    [facility] is posting 1 beds. Low acuity. [phone] 7:45pm-Per Dari.     [facility] is posting 0 beds. 72 hr hold preferred. [phone] -7:53pm-Per Viviana, they have a couple low acuity beds only.     [facility] is  posting 7 beds.  [phone] 7:45AM- Per Zuleima, they have a few beds.       [facility] is posting 2 beds. Voluntary only- no holds/commitments, No Hx of aggression, violence, or assault. No sexual offenders. No 72 hr holds. [phone]    [facility] is posting 5 beds. (Must have the cognitive ability to do programming. No aggressive or violent behavior or recent HX in the last 2 yrs. MH must be primary.) [phone]   [facility] is posting 0 beds. Low acuity only. Violence and aggression capped. [phone]     [facility] is posting 0 bed. Low acuity, Neg Covid. [phone]    [facility] is posting 1 bed. Covid neg. Vol only. Combined adolescent and adult unit. No aggressive or violent behavior. No registered sex offenders. [phone]    [facility] [place] posting 0 beds. Negative covid.  7AM- No beds.   [facility]: Posting 0 bed. (No hx of aggression/assault. No lines, drains or tubes. Does not provide detox or CD treatment).  [phone]     [facility] is posting 6 beds. Call for details. [phone] *[place]*   [facility] is posting 4 beds. [phone]        Pt remains on work list pending appropriate bed availability.      10:27 PM Called [facility] #[phone] and [facility] has [facility] beds available.  Pt will need to get additional labs of Comprehensive, CBC, UA, ETOH,and Aspirin/Tylenol.  Please call Peg #[phone] once labs are completed before faxing over clinical.    10:40 PM Talked to [facility] ED RN and explained [facility] asking for labs.  She will have the MD get orders.

## 2023-08-27 NOTE — ED NOTES
Pt offered tv and declines. Pt just wants to sleep. States still hearing voices. States ear plugs did not help. Remains cooperative

## 2023-08-27 NOTE — ED NOTES
Talked with Pato from [facility]. Will f/u with pt tomorrow am as pt is sleeping

## 2023-08-27 NOTE — ED NOTES
[facility] ED Mental Health Handoff Note:     Assuming care from: Dr Ramses Duque    Brief HPI: 37 year old male signed out to me by the above provider. See initial ED Provider note for full details of the presentation.   In brief, patient presented with suicidal ideation     See Dr. Lainez's note on 8/26 for HPI     Home meds reviewed and ordered/administered: Yes  Medically stable for inpatient mental health admission: Yes.  Evaluated by mental health: Yes. The recommendation is for inpatient mental health treatment. Bed search in process  Safety concerns: At the time I received sign out, there were no safety concerns.    Hold Status:  Active Orders   N/A       Labs/Imaging:   Results for orders placed or performed during the hospital encounter of 08/25/23 (from the past 24 hour(s))   [facility] ([facility]) Assessment Consult Order:     Status: None ()    Collection Time: 08/25/23 11:16 PM    Antonina Munoz LP     8/26/2023  4:44 AM  Diagnostic Evaluation Consultation  Crisis Assessment    Patient Name: Chu Pimentel  Age:  37 year old  Legal Sex: male  Gender Identity: male  Pronouns: He/him/his  Race:    Black or   White  Ethnicity: Not  or   Language: English      Patient was assessed: Virtual: [facility] Crisis Assessment Start   Time: 0255 Crisis Assessment Stop Time: 0310  Patient location: [facility] EMERGENCY   DEPARTMENT                             [id number]    Referral Data and Chief Complaint  Chu Pimentel presents to the ED by  self. Patient is presenting   to the ED for the following concerns: Worsening psychosocial   stress, Depression, Suicidal ideation, Substance use, Anxiety.     Factors that make the mental health crisis life threatening or   complex are:  Patient reports increased psychosocial stress   including homelessness and being off his psychiatric medication   for two months. He hasn't had his meds  because he does not have a   primary care provider. Patient is hearing voices telling him to   do things such as kill himself. He does not feel safe outside of   the hospital. Patient requests placement and would like inpatient   psychiatric treatment to restart his medications..      Informed Consent and Assessment Methods  Explained the crisis assessment process, including applicable   information disclosures and limits to confidentiality, assessed   understanding of the process, and obtained consent to proceed   with the assessment.  Assessment methods included conducting a   formal interview with patient, review of medical records,   collaboration with medical staff, and obtaining relevant   collateral information from family and community providers when   available.  : done     Patient response to interventions: eager to participate,   acceptance expressed, verbalizes understanding  Coping skills were attempted to reduce the crisis:  Patient came   to the ED to request help.     History of the Crisis   Previous diagnoses include MDD, CULLEN, and Meth Abuse. He has had   inpatient hospitalization. Patient has had QUINCY treatment 15 times   with the most recent at [facility]. Patient admitted that he   does not take QUINCY treatment seriously.  He reports family history   of mental health and substance use disorders.    Brief Psychosocial History  Family:  , Children yes  Support System:  None  Employment Status:  unemployed  Source of Income:  unknown  Financial Environmental Concerns:  unemployed  Current Hobbies:  None identified  Barriers in Personal Life:  mental health concerns,   transportation concerns, lack of companionship, financial   concerns    Significant Clinical History  Current Anxiety Symptoms:  anxious  Current Depression/Trauma:  thoughts of death/suicide, difficulty   concentrating, impaired decision making, irritable  Current Somatic Symptoms:     Current Psychosis/Thought Disturbance:   auditory hallucinations,   high risk behavior, impulsive  Current Eating Symptoms:     Chemical Use History:  Alcohol: Social  Benzodiazepines: None  Opiates: None  Cocaine: None  Marijuana: Occasional  Other Use: Methamphetamines  Last Use::  (A few days ago.)   Past diagnosis:  ADHD, Anxiety Disorder, Depression, Substance   Use Disorder, PTSD  Family history:  Other (Patient did not provide details but   endorsed family history.)  Past treatment:  Psychiatric Medication Management, Primary Care,   Residential Treatment, Individual therapy  Details of most recent treatment:  [facility] treatment at [facility].  Other relevant history:          Collateral Information  Is there collateral information: No. A voicemail was left for   patient's mother with request for return call.      Collateral information name, relationship, phone number:     Mother, Ninfa Pimentel,[phone]    What happened today:       What is different about patient's functioning:       Concern about alcohol/drug use:      What do you think the patient needs:      Has patient made comments about wanting to kill   themselves/others:      If d/c is recommended, can they take part in safety/aftercare   planning:       Additional collateral information:   N/A     Risk Assessment  McCoy Suicide Severity Rating Scale Full Clinical Version:  Suicidal Ideation  Q6 Suicide Behavior (Lifetime): yes    McCoy Suicide Severity Rating Scale Recent: 8/26/2023  Suicidal Ideation (Recent)  Q1 Wished to be Dead (Past Month): yes  Q2 Suicidal Thoughts (Past Month): yes  Q3 Suicidal Thought Method: yes  Q4 Suicidal Intent without Specific Plan: no  Q5 Suicide Intent with Specific Plan: yes  Within the Past 3 Months?: yes  Level of Risk per Screen: high risk  Intensity of Ideation (Recent)  Most Severe Ideation Rating (Past 1 Month): 3  Frequency (Past 1 Month): 2-5 times in week  Duration (Past 1 Month): Less than 1 hour/some of the time  Controllability (Past  1 Month): Can control thoughts with a lot   of difficulty  Deterrents (Past 1 Month): Uncertain that deterrents stopped you  Reasons for Ideation (Past 1 Month): Mostly to end or stop the   pain (You couldn't go on living with the pain or how you were   feeling)  Suicidal Behavior (Recent)  Actual Attempt (Past 3 Months): No  Has subject engaged in non-suicidal self-injurious behavior?   (Past 3 Months): No  Interrupted Attempts (Past 3 Months): No  Aborted or Self-Interrupted Attempt (Past 3 Months): No  Preparatory Acts or Behavior (Past 3 Months): No    Environmental or Psychosocial Events: other life stressors,   unstable housing, homelessness, unemployment/underemployment  Protective Factors: Protective Factors: reality testing ability,   help seeking, able to access care without barriers    Does the patient have thoughts of harming others? Feels Like   Hurting Others: no  Previous Attempt to Hurt Others: no  Current presentation: Confused  Violence Threats in Past 6 Months: No  Current Violence Plan or Thoughts: Denied  Is the patient engaging in sexually inappropriate behavior?: no  Duty to warn initiated: no  Duty to warn details: N/A    Is the patient engaging in sexually inappropriate behavior?  no          Mental Status Exam   Affect: Flat  Appearance: Appropriate  Attention Span/Concentration: Attentive  Eye Contact: Engaged    Fund of Knowledge: Appropriate   Language /Speech Content: Fluent  Language /Speech Volume: Normal  Language /Speech Rate/Productions: Normal  Recent Memory: Intact  Remote Memory: Intact  Mood: Anxious, Irritable  Orientation to Person:     Orientation to Place: Yes  Orientation to Time of Day: Yes  Orientation to Date: Yes     Situation (Do they understand why they are here?): Yes  Psychomotor Behavior: Normal  Thought Content: Suicidal, Hallucinations  Thought Form: Intact        Medication  Psychotropic medications:   Medication Orders - Psychiatric (From admission, onward)       None             Current Care Team  Patient Care Team:  No Ref-Primary, Physician as PCP - General  No Ref-Primary, Physician  Riri Ortiz, APRN CNP as Assigned PCP    Diagnosis  Patient Active Problem List   Diagnosis Code    Essential hypertension I10    Anxiety F41.9    Recurrent major depressive disorder (H) F33.9    Migraine without aura G43.009    Tobacco abuse Z72.0    Class 3 severe obesity due to excess calories with serious   comorbidity and body mass index (BMI) of 40.0 to 44.9 in adult   (H) E66.01, Z68.41    Insomnia due to other mental disorder F51.05, F99    Attention deficit hyperactivity disorder (ADHD), predominantly   inattentive type F90.0    CULLEN (generalized anxiety disorder) F41.1    Moderate episode of recurrent major depressive disorder (H)   F33.1    Methamphetamine abuse (H) F15.10    Paranoid ideation (H) F22    Trench foot, unspecified laterality, initial encounter T69.029A    At risk for unsafe behavior Z91.89    Infection due to 2019 novel coronavirus U07.1       Primary Problem This Admission  There are no active hospital problems to display for this   patient.    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 He is experiencing auditory   command hallucinations telling him to harm himself. He is   suicidal with thoughts of jumping off a tree or walking in   traffic hoping a car will hit him. Patient would like to restart   medications and stabilize psychiatrically. He would benefit from   inpatient admission for medication management, safety, and   stabilization. He agrees to a voluntary admission.       Imminent risk of harm: Suicidal Behavior  Severe  psychiatric, behavioral or other comorbid conditions are   appropriate for management at inpatient mental health as   indicated by at least one of the following: Psychiatric Symptoms,   Comorbid substance use disorder, Impaired impulse control,   judgement, or insight  Severe dysfunction in daily living is present as indicated by at   least one of the following: Extreme deterioration in social   interactions, Complete inability to maintain any appropriate   aspect of personal responsibility in any adult roles, Complete   withdrawal from all social interactions  Situation and expectations are appropriate for inpatient care:   Patient management/treatment at lower level of care is not   feasible or is inappropriate  Inpatient mental health services are necessary to meet patient   needs and at least one of the following: Specific condition   related to admission diagnosis is present and judged likely to   deteriorate in absence of treatment at proposed level of care      Patient coping skills attempted to reduce the crisis:  Patient   came to the ED to request help.    Disposition  Recommended disposition: Inpatient Mental Health        Reviewed case and recommendations with attending provider.   Attending Name: Dr. Lainez       Attending concurs with disposition: yes       Patient and/or validated legal guardian concurs with disposition:     yes       Final disposition:  inpatient mental health    Legal status on admission: Voluntary/Patient has signed consent   for treatment    Assessment Details   Total duration spent on the patient case in minutes: 15 min     CPT code(s) utilized: 79902 - Psychotherapy for Crisis - 60   (30-74*) min    Antonina Birch LP, Psychotherapist  DEC - Triage & Transition Services  Callback: [phone]           Urine Drugs of Abuse Screen     Status: Abnormal    Collection Time: 08/25/23 11:26 PM    Narrative    The following orders were created for panel order Urine Drugs of Abuse  Screen.  Procedure                               Abnormality         Status                     ---------                               -----------         ------                     Drug abuse screen 77 uri...[[id number]]  Abnormal            Final result                 Please view results for these tests on the individual orders.   Drug abuse screen 77 urine (FL, RH, SH)     Status: Abnormal    Collection Time: 08/25/23 11:26 PM   Result Value Ref Range    Amphetamines Urine Screen Positive (A) Screen Negative    Barbituates Urine Screen Negative Screen Negative    Benzodiazepine Urine Screen Negative Screen Negative    Cannabinoids Urine Screen Negative Screen Negative    Opiates Urine Screen Negative Screen Negative    PCP Urine Screen Negative Screen Negative    Cocaine Urine Screen Negative Screen Negative   UA with Microscopic reflex to Culture     Status: Abnormal    Collection Time: 08/25/23 11:26 PM    Specimen: Urine, Clean Catch   Result Value Ref Range    Color Urine Orange (A) Colorless, Straw, Light Yellow, Yellow    Appearance Urine Cloudy (A) Clear    Glucose Urine Negative Negative mg/dL    Bilirubin Urine Negative Negative    Ketones Urine Negative Negative mg/dL    Specific Gravity Urine 1.022 1.001 - 1.030    Blood Urine Negative Negative    pH Urine 6.0 5.0 - 7.0    Protein Albumin Urine Negative Negative mg/dL    Urobilinogen Urine 2.0 (A) <2.0 mg/dL    Nitrite Urine Negative Negative    Leukocyte Esterase Urine Negative Negative    RBC Urine 0 <=2 /HPF    WBC Urine 0 <=5 /HPF    Narrative    Urine Culture not indicated   Asymptomatic COVID-19 Virus (Coronavirus) by PCR Nasopharyngeal     Status: Normal    Collection Time: 08/25/23 11:27 PM    Specimen: Nasopharyngeal; Swab   Result Value Ref Range    SARS CoV2 PCR Negative Negative    Narrative    Testing was performed using the Xpert Xpress SARS-CoV-2 Assay on the Cepheid Gene-Xpert Instrument Systems. Additional information about this  Emergency Use Authorization (EUA) assay can be found via the Lab Guide. This test should be ordered for the detection of SARS-CoV-2 in individuals who meet SARS-CoV-2 clinical and/or epidemiological criteria as well as from individuals without symptoms or other reasons to suspect COVID-19. Test performance for asymptomatic patients has only been established in anterior nasal swab specimens. This test is for in vitro diagnostic use under the FDA EUA for laboratories certified under CLIA to perform high complexity testing. This test has not been FDA cleared or approved. A negative result does not rule out the presence of PCR inhibitors in the specimen or target RNA concentration below the limit of detection for the assay. The possibility of a false negative should be considered if the patient's recent exposure or clinical presentation suggests COVID-19.. This test was validated by the [facility]. This laboratory is certified under the Clinical Laboratory Improvement Amendments (CLIA) as qualified to perform high complexity laboratory testing.     CBC with platelets + differential     Status: None (In process)    Collection Time: 08/26/23 11:03 PM    Narrative    The following orders were created for panel order CBC with platelets + differential.  Procedure                               Abnormality         Status                     ---------                               -----------         ------                     CBC with platelets and d...[[id number]]                      In process                   Please view results for these tests on the individual orders.         ED Meds:  Medications   citalopram (celeXA) tablet 40 mg (has no administration in time range)   OLANZapine (zyPREXA) tablet 5 mg (has no administration in time range)   lisinopril (ZESTRIL) tablet 20 mg (has no administration in time range)   OLANZapine (zyPREXA) tablet 5 mg (5 mg Oral $Given 8/25/23 8731)    lisinopril (ZESTRIL) tablet 30 mg (30 mg Oral Not Given 8/26/23 0300)   amLODIPine (NORVASC) tablet 10 mg (10 mg Oral Not Given 8/26/23 0300)   citalopram (celeXA) tablet 40 mg (40 mg Oral $Given 8/26/23 0048)     No orders to display       ED Course:       There were no significant events during my shift.    Patient had labs done.  No acute findings.    Patient was signed out to the oncoming provider, Dr. Dru Trimble at shift change    Impression:    ICD-10-CM    1. Severe episode of recurrent major depressive disorder, with psychotic features (H)  F33.3       2. Drug abuse (H)  F19.10           Plan:    Awaiting inpatient mental health admission/transfer.    Kevin Lainez MD  [facility] EMERGENCY DEPARTMENT  [address] [place] [id number]  [phone]                     Kevin Lainez MD  08/27/23 0709

## 2023-08-27 NOTE — ED NOTES
[facility] ED Mental Health Handoff Note:     Assuming care from: Dr Kevin Lainez    Brief HPI: 37 year old male signed out to me by the above provider. See initial ED Provider note for full details of the presentation.   In brief, patient presented for suicidal ideation.    States he hears voices that tell him his fingers are going to get cut off if he doesn't do certain things. Notes he has been having thoughts of walking onto the freeway to get hit by a car. He states he has not been taking his medications, Zoloft and Prozac, because he was too scared to take them. He came to the ER today to get some medications and to get help moving forward. He states he wants to get his life back on track. He has been in and out of crisis centers and notes he has been sleeping on [facility] floors. Notes he is achy because he's been wandering around lately. Notes he last used methamphetamines 2 days ago.     Patient otherwise denies fever or vomiting.      Home meds reviewed and ordered/administered: Yes  Medically stable for inpatient mental health admission: Yes.  Evaluated by mental health: Yes. The recommendation is for inpatient mental health treatment. Bed search in process  Safety concerns: At the time I received sign out, there were no safety concerns.    Hold Status:  Active Orders   N/A           Labs/Imaging:   Results for orders placed or performed during the hospital encounter of 08/25/23 (from the past 24 hour(s))   CBC with platelets + differential     Status: None    Collection Time: 08/26/23 11:03 PM    Narrative    The following orders were created for panel order CBC with platelets + differential.  Procedure                               Abnormality         Status                     ---------                               -----------         ------                     CBC with platelets and d...[[id number]]                      Final result                 Please view results for these tests on the  individual orders.   Comprehensive metabolic panel     Status: Abnormal    Collection Time: 08/26/23 11:03 PM   Result Value Ref Range    Sodium 142 136 - 145 mmol/L    Potassium 3.9 3.4 - 5.3 mmol/L    Chloride 108 (H) 98 - 107 mmol/L    Carbon Dioxide (CO2) 29 22 - 29 mmol/L    Anion Gap 5 (L) 7 - 15 mmol/L    Urea Nitrogen 9.1 6.0 - 20.0 mg/dL    Creatinine 1.02 0.67 - 1.17 mg/dL    Calcium 8.8 8.6 - 10.0 mg/dL    Glucose 101 (H) 70 - 99 mg/dL    Alkaline Phosphatase 51 40 - 129 U/L    AST 16 0 - 45 U/L    ALT 11 0 - 70 U/L    Protein Total 5.7 (L) 6.4 - 8.3 g/dL    Albumin 3.5 3.5 - 5.2 g/dL    Bilirubin Total 0.4 <=1.2 mg/dL    GFR Estimate >90 >60 mL/min/1.73m2   Alcohol level blood     Status: Normal    Collection Time: 08/26/23 11:03 PM   Result Value Ref Range    Alcohol ethyl <0.01 <=0.01 g/dL   Salicylate level     Status: Normal    Collection Time: 08/26/23 11:03 PM   Result Value Ref Range    Salicylate <0.3   mg/dL   Acetaminophen level     Status: Abnormal    Collection Time: 08/26/23 11:03 PM   Result Value Ref Range    Acetaminophen <5.0 (L) 10.0 - 30.0 ug/mL   CBC with platelets and differential     Status: None    Collection Time: 08/26/23 11:03 PM   Result Value Ref Range    WBC Count 5.3 4.0 - 11.0 10e3/uL    RBC Count 4.76 4.40 - 5.90 10e6/uL    Hemoglobin 13.8 13.3 - 17.7 g/dL    Hematocrit 42.4 40.0 - 53.0 %    MCV 89 78 - 100 fL    MCH 29.0 26.5 - 33.0 pg    MCHC 32.5 31.5 - 36.5 g/dL    RDW 13.9 10.0 - 15.0 %    Platelet Count 192 150 - 450 10e3/uL    % Neutrophils 48 %    % Lymphocytes 40 %    % Monocytes 8 %    % Eosinophils 3 %    % Basophils 1 %    % Immature Granulocytes 0 %    NRBCs per 100 WBC 0 <1 /100    Absolute Neutrophils 2.6 1.6 - 8.3 10e3/uL    Absolute Lymphocytes 2.1 0.8 - 5.3 10e3/uL    Absolute Monocytes 0.4 0.0 - 1.3 10e3/uL    Absolute Eosinophils 0.2 0.0 - 0.7 10e3/uL    Absolute Basophils 0.0 0.0 - 0.2 10e3/uL    Absolute Immature Granulocytes 0.0 <=0.4 10e3/uL     Absolute NRBCs 0.0 10e3/uL         ED Meds:  Medications   citalopram (celeXA) tablet 40 mg (has no administration in time range)   OLANZapine (zyPREXA) tablet 5 mg (has no administration in time range)   lisinopril (ZESTRIL) tablet 20 mg (has no administration in time range)   OLANZapine (zyPREXA) tablet 5 mg (5 mg Oral $Given 8/25/23 2326)   lisinopril (ZESTRIL) tablet 30 mg (30 mg Oral Not Given 8/26/23 0300)   amLODIPine (NORVASC) tablet 10 mg (10 mg Oral Not Given 8/26/23 0300)   citalopram (celeXA) tablet 40 mg (40 mg Oral $Given 8/26/23 0048)     No orders to display       ED Course:     8:35 AM I spoke with DEC.  There is a possible admission to the [facility] in [place] last p.m.  Review of the notes indicate that the patient has prior arrest for assaultive behavior and currently has a felony warrant and they do not feel that he is appropriate for their facility.  We will continue bed search for patient psychiatric placement     There were no significant events during my shift.    [facility]es Du[facility] at shift change    Impression:    ICD-10-CM    1. Severe episode of recurrent major depressive disorder, with psychotic features (H)  F33.3       2. Drug abuse (H)  F19.10           Plan:    Awaiting inpatient mental health admission/transfer.    I, Eliceo Carlos, am serving as a scribe to document services personally performed by Dru Trimble D.O., based on my observations and the provider's statements to me.  IDru D.O., attest that Eliceo Carlos is acting in a scribe capacity, has observed my performance of the services and has documented them in accordance with my direction.    Dru Trimble D.O.  [facility] EMERGENCY DEPARTMENT  [address] [place] [phone]  [phone]                   Dru Trimble,   08/27/23 9139

## 2023-08-28 ENCOUNTER — TELEPHONE (OUTPATIENT)
Dept: BEHAVIORAL HEALTH | Facility: CLINIC | Age: 38
End: 2023-08-28
Payer: MEDICAID

## 2023-08-28 PROCEDURE — 99245 OFF/OP CONSLTJ NEW/EST HI 55: CPT | Performed by: REGISTERED NURSE

## 2023-08-28 PROCEDURE — 250N000013 HC RX MED GY IP 250 OP 250 PS 637: Performed by: EMERGENCY MEDICINE

## 2023-08-28 PROCEDURE — 93005 ELECTROCARDIOGRAM TRACING: CPT | Performed by: REGISTERED NURSE

## 2023-08-28 RX ADMIN — LISINOPRIL 20 MG: 20 TABLET ORAL at 08:27

## 2023-08-28 RX ADMIN — OLANZAPINE 5 MG: 5 TABLET, FILM COATED ORAL at 19:07

## 2023-08-28 RX ADMIN — NICOTINE POLACRILEX 2 MG: 2 GUM, CHEWING BUCCAL at 20:41

## 2023-08-28 RX ADMIN — CITALOPRAM HYDROBROMIDE 40 MG: 40 TABLET ORAL at 08:26

## 2023-08-28 ASSESSMENT — ACTIVITIES OF DAILY LIVING (ADL)
ADLS_ACUITY_SCORE: 35

## 2023-08-28 NOTE — PROGRESS NOTES
"Triage & Transition Services, Extended Care     Therapy Progress Note    Patient: Chu goes by \"Chu,\" uses he/him pronouns  Date of Service: August 28, 2023  Site of Service: [facility] ED  Patient was seen virtually ([employer] cart or other teleconferencing device).     Presenting problem:   Chu is followed related to Long wait time for admission: boarding for IP MH placement . Please see initial DEC/LM Crisis Assessment completed by Dede Birch on 8/26/23 for complete assessment information. Notable concerns include SI with plan, auditory hallucinations.        Individuals Present: Chu & Pato Velazquez    Session start: 12:27 PM  Session end: 12:33 PM  Session duration in minutes: 6 minutes    Current Presentation:   Writer and patient met virtually. Patient continues to be minimally interactive telling writer he continues to have thoughts of suicide, and continues to hear voices. He does state these are commanding in nature but does not provide any details when prompted. Patient additionally does endorse fear that others are out to get him, and reports difficulty meeting with writer virtually. Patient declines any other needs from writer, and continues to appear somewhat paranoid and guarded. Patient denies thoughts to harm others.     Mental Status Exam:   Appearance: awake, alert  Attitude: guarded and somewhat cooperative  Eye Contact: intense  Mood:  frustrated  Affect: mood congruent  Speech: clear, coherent  Psychomotor Behavior: no evidence of tardive dyskinesia, dystonia, or tics  Thought Process:  linear  Associations: no loose associations  Thought Content: plan for suicide present and auditory hallucinations present  Insight: limited  Judgement: fair  Oriented to: time, person, and place  Attention Span and Concentration: fair  Recent and Remote Memory: fair    Diagnosis:   Major depressive disorder, Recurrent episode, Moderate     F33.1                             Generalized anxiety disorder  " F41.1                             Other stimulant use, unspecified with stimulant-induced psychotic disorder, 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 Engaged in cognitive restructuring/ reframing, looked at common cognitive distortions and challenged negative thoughts.    Treatment Objective(s) Addressed:   The focus of this session was on rapport building.     Progress Towards Goals:   Patient reports  no change in  symptoms. Patient is not making progress towards treatment goals as evidenced by continuing to struggle to engage in video sessions..     Case Management:   N/A     General Recommendations:   Continue to monitor for harm. Consider: Use a positive, direct and calm approach. Pt's tend to match the energy/mood of the staff. Keep focus positive and upbeat    Plan:   Inpatient Mental Health: Ольгаnet continues to have suicidal thoughts, and continues to endorse command hallucinations. Patient continues to be in agreement with IP MH placement.    Plan for Care reviewed with Assigned Medical Provider? Yes. Provider, Dr Sotomayor, response: in agreement.     Pato Velazquez   Licensed Mental Health Professional (LMHP), [facility]  [phone]

## 2023-08-28 NOTE — TELEPHONE ENCOUNTER
R:  No appropriate [facility] beds available.    [facility] Access Inpatient Bed Call Log 8/27/2023 @ 3:19 PM        Intake has called facilities that have not updated their bed status within the last 12 hours.      Adults:         [facility] is posting 0 beds.    [facility] is posting 0 beds. [phone]; per call at 7:08 am to Elise, they are at cap.   [facility] is posting 0 beds. [phone];    [facility] is posting 0 beds. [phone]; per call at 7:10 am to Mee, they are at cap for the day.   [facility] is posting 0 beds. [phone]     [facility] is posting 0 beds. [phone]     [facility] is posting 0 beds. [phone]     [facility] is posting 0 beds. [phone]    [facility] is posting 0 beds. [phone]           [facility] is posting 0 beds. [phone]    [facility] is posting 1 beds. Mixed unit 12+. Low acuity only.  [phone]    [facility] is posting 0 beds. No aggression.  [phone]    [facility] is posting 0 beds. [phone] [id number]    [facility] is posting 1 bed. Low acuity. [phone]   Pt was declined by all [facility] 8/27 due to Meth use and Hx of property damage  [facility] is posting 2 bed. Low acuity. [phone]    [facility] is posting 2 bed. Very low acuity. Pt was declined by all [facility] 8/27 due to Meth use and Hx of property [phone]  .  per call at 7:21 am to Reba, they have 2 beds avail, low acuity.    [facility] is posting 1 bed. 72 hr hold preferred. Low acuity. [phone] - per call at 7:25 am to Viviana, they have 1 bed avail.  Pt is  too acute  [facility] is posting 5 beds.  [phone]  Pt was declined by all [facility] 8/27 due to Meth use and Hx of property damage    [facility] is posting 2 beds. Voluntary only- no holds/commitments, No Hx of aggression, violence, or assault. No sexual offenders. No 72 hr holds.   Pt  is  too acute  [facility] is posting 4 beds. (Must have the cognitive ability to do programming. No aggressive or violent behavior or recent HX in the last 2 yrs. MH must be primary.) [phone]   [facility] is posting 0 beds. Low acuity only. Violence and aggression capped. [phone]  per call at 7:31 am to Angely, they are at cap for the day.    [facility] is posting 0 bed. Low acuity, Neg Covid. [phone]; per call at 7:33 am to [place], they are at cap.    [facility] is posting 1 bed. Covid neg. Vol only. Combined adolescent and adult unit. No aggressive or violent behavior. No registered sex offenders. [phone]; per call at 7:35 am to Cameron, they have 1 bed.   Pt is  too acute  [facility] posting 0 beds. Negative covid.     [facility]: Posting 0 bed. (No hx of aggression/assault. No lines, drains or tubes. Does not provide detox or CD treatment).  [phone]  per call at 7:39 am to Nenita, they are at cap for the day but are willing to review today for possible openings for tomorrow.    [facility] is posting 2 beds. Call for details. [phone]; per call at 7:42 am, the phone rang multiple times with no answer and the call was then disconnected.   [facility] is posting 4 beds. [phone] per call at 7:43 am to Zuleima, they have 5 beds avail.        Pt remains on work list pending appropriate bed availability.

## 2023-08-28 NOTE — TELEPHONE ENCOUNTER
No appropriate beds are currently available within the  system. Bed search update (Statewide) @ 12:08AM       [facility]: @ cap per website. Per call @ 00:08, they do not have beds  [facility]: @ cap per website  [facility]: @ cap per website. Per Juancho @ 00:09, they are full tonight, check back after 08:30  [facility]: @ cap per website  [facility]: @ cap per website  [facility]: @ cap per website  [facility]: @ cap per website  [facility]: @ cap per website  [facility]: @ cap per website  [facility]: @ cap per website  [facility]: @ cap per website  [facility]: @ cap per website  [facility]: Posting 2 beds. Per Don @ 00:09, they are currently in review, check back later  [facility]: Does not review after 10PM.    [facility]: Posting beds for [place], [place] and [place]. Declined 8/27 d/t meth use and hx of first degree damage to property charge - meets exclusionary for [facility]: @ cap per website. (No hx of aggression. No sexual offenders. Voluntary patients only).  [facility]: Posting 4 beds (Low acuity only. Must have the cognitive ability to do programming. No aggressive or violent behavior or recent HX in the last 2 yrs. MH must be primary). Declined 8/27 pt d/t believing CD use is primary concern versus MH   [facility]: @ cap per website  [facility]: @ cap per website. Low acuity, Neg Covid.  [facility]: Posting 1 bed (Covid neg. Voluntary only. Mixed unit. No aggressive or violent behavior. No registered sex offenders). Declined 8/27 d/t active warrants and has felony re: property damage  [facility]: @ cap per website (No hx of aggression/assault. No lines, drains or tubes. Does not provide detox or CD treatment).   [facility]: Posting 2 beds. Facility is in [place]. Pt requests statewide search at this time   [facility]: Posting 4 beds (Mixed unit/Low  acuity). Per Josi @ 00:12, they are holding off on anymore referrals tonight as their provider wants to assess unit acuity before taking anymore possible admissions.        Pt remains on work list until appropriate placement is available

## 2023-08-28 NOTE — ED NOTES
"ED Behavioral Health Patient Transition of Care Note      Brief behavioral history:  drug abuse, depression, SI    Any outstanding medical concerns: HTN     Has SW seen the patient:  yes    Is the patient on a hold:  Pt is not on a hold; he is voluntary but holdable    Current plan for disposition:  [facility] attempting to find a bed    Safety concerns:  Pt is a little \"gruff\" at times, but has been completely cooperative since I began caring for him at 0700 this morning    Dietary restrictions:  None, pt can eat and drink ad corin    Significant events during shift:  none thus far; update from psychology stating that a search for placement continues and pt is aware of this              "

## 2023-08-28 NOTE — ED NOTES
[facility] ED Mental Health Handoff Note:     Assuming care from: Dr Ramses Duque    Brief HPI: 37 year old male signed out to me by the above provider. See initial ED Provider note for full details of the presentation.   In brief, patient presented with suicidal ideation and is reportedly voluntary but holdable.    See Dr. Lainez's note from 8/26 for HPI     Home meds reviewed and ordered/administered: Yes  Medically stable for inpatient mental health admission: Yes.  Evaluated by mental health: Yes. The recommendation is for inpatient mental health treatment. Bed search in process  Safety concerns: At the time I received sign out, there were no safety concerns.    Hold Status:  Active Orders   N/A       Labs/Imaging:   No results found for this visit on 08/25/23 (from the past 24 hour(s)).      ED Meds:  Medications   citalopram (celeXA) tablet 40 mg (40 mg Oral $Given 8/27/23 0809)   OLANZapine (zyPREXA) tablet 5 mg (5 mg Oral $Given 8/27/23 1008)   lisinopril (ZESTRIL) tablet 20 mg (20 mg Oral $Given 8/27/23 0809)   OLANZapine (zyPREXA) tablet 5 mg (5 mg Oral $Given 8/25/23 2326)   lisinopril (ZESTRIL) tablet 30 mg (30 mg Oral Not Given 8/26/23 0300)   amLODIPine (NORVASC) tablet 10 mg (10 mg Oral Not Given 8/26/23 0300)   citalopram (celeXA) tablet 40 mg (40 mg Oral $Given 8/26/23 0048)     No orders to display       ED Course:       There were no significant events during my shift.    Patient was signed out to the oncoming provider, Dr. Puma Romo at shift change    Impression:    ICD-10-CM    1. Severe episode of recurrent major depressive disorder, with psychotic features (H)  F33.3       2. Drug abuse (H)  F19.10           Plan:    Awaiting inpatient mental health admission/transfer.    America Blanco MD  [facility] EMERGENCY DEPARTMENT  [address] [place] [id number]  [phone]                     America Blanco MD  08/28/23 0613

## 2023-08-28 NOTE — ED NOTES
Pt has a good appetite and has been given extra snacks and fluids during the hours of today. I have a zip lock bag in the fridge for an evening snack for him.

## 2023-08-28 NOTE — CONSULTS
"      Psychiatry Consultation; Follow up              Reason for Consult, requesting source:    Review MH medications    Requesting source: America Blanco    Labs and imaging reviewed, Nursing consulted and notes reviewed.     Total time spent in chart review, patient interview and coordination of care: 60 minutes.                 Interim history:    Psychiatry seeing patient today regarding review of mental health medications.    Per ED provider note: \"37 year old male presents to the Emergency Department for evaluation of depression, hallucinations and concern for command hallucinations to harm himself.  Patient has a long history of this.  Does have a history of meth abuse.  Last used 2 days ago.  Having continued hallucinations.  They are telling him to harm himself.  Continues to feel suicidal.  States he has been sleeping on [place] because he feels like this will protect him.  Patient seen by DEC .  Given patient's worsening symptoms in addition to failure of outpatient management with last discharge patient will be admitted for mental health.  He is voluntary at this time.  Patient will be signed out to the oncoming physician pending mental health patient.  Home medications are written.\"    Today, patient reports, \"I'm still seeing things\" when asked how he is feeling. Patient responded, \"yes\" when asked if he has thoughts of harming himself. Denies thoughts of harming others, but states, \"I would feel like hurting them if they harm me.\" Patient denies current substance use, but UA positive for amphetamines. He shares that he is currently staying in shelters.         Current Medications:      citalopram  40 mg Oral Daily    lisinopril  20 mg Oral Daily     Reports that he feels Celexa is not effective for him. Per chart review, he has been taking this medication for several years.          MSE:   Appearance: awake, alert  Attitude:  slightly uncooperative  Eye Contact:  poor   Mood:  " anxious  Affect:  slightly restricted  Speech:  pressured speech  Psychomotor Behavior:  no evidence of tardive dyskinesia, dystonia, or tics  Muscle strength and tone: appears average  Thought Process:  linear  Associations:  no loose associations  Thought Content:  passive suicidal ideation present, auditory hallucinations present, and visual hallucinations present  Insight:  partial  Judgement:  poor  Oriented to:  time, person, and place  Attention Span and Concentration:  poor  Recent and Remote Memory:  intact    Vital signs:  Temp: 97.6  F (36.4  C) Temp src: Oral BP: 131/71 Pulse: 60   Resp: 18 SpO2: 97 % O2 Device: None (Room air)        Estimated body mass index is 34.58 kg/m  as calculated from the following:    Height as of 7/24/23: 1.829 m (6').    Weight as of 7/24/23: 115.7 kg (255 lb).    Qtc: No recent EKG         DSM-5 Diagnosis:   Major depressive disorder, Recurrent episode, Moderate     F33.1                             Generalized anxiety disorder  F41.1                             Other stimulant use, unspecified with stimulant-induced psychotic disorder, with hallucinations F15.951          Assessment:   Patient was calm but somewhat irritable during our meeting. Patient was initially seen by extended care, who recommended inpatient psychiatry. Due to patient's continued report of suicidal thoughts and hallucinations, it might benefit him to transition to inpatient psychiatry for further stabilization and medication management. There is some conflicting information regarding patient's Celexa prescription, which patient states was started in the emergency department. However, chart review indicates that up until a few months ago, patient had taken this medication for several years. Given patient's report of active hallucinations, he might benefit from a medication that also targets these symptoms.             Summary of Recommendations:   Ordered EKG to establish baseline QTc - Celexa can  prolong QTc, particularly at higher doses  -If 500 or below, can continue Celexa    If QTc 500 or below, could start Seroquel 12.5 mg at HS to assist with sleep and hallucinations - can also target symptoms of depression    Addendum for 08/29/23: It appears that EKG has not yet been done, so I have ordered this. A baseline EKG should be established due to patient currently taking Celexa.        Page me or re-consult psychiatry as needed (psychiatry is signing off).       Rain Nunes, GULSHAN, APRN  Consult/Liaison Psychiatry  [facility]   Contact information available via [facility] Paging/Directory.  If I am not available, please call [facility] intake ([phone])

## 2023-08-28 NOTE — ED NOTES
ED to Behavioral Floor Handoff    SITUATION  Chu Pimentel is a 37 year old male who speaks English and lives is homeless alone The patient arrived in the ED by walking from homeless with a complaint of Suicidal  .The patient's current symptoms started/worsened 2 day(s) ago and during this time the symptoms have increased.   In the ED, pt was diagnosed with   Final diagnoses:   Severe episode of recurrent major depressive disorder, with psychotic features (H)   Drug abuse (H)        Initial vitals were: BP: (!) 181/102  Pulse: 73  Temp: 97.9  F (36.6  C)  Resp: 18  SpO2: 100 %   --------  Is the patient diabetic? No   If yes, last blood glucose? --     If yes, was this treated in the ED? --  --------  Is the patient inebriated (ETOH) No or Impaired on other substances? Yes  MSSA done? No  Last MSSA score: --    Were withdrawal symptoms treated? No  Does the patient have a seizure history? No. If yes, date of most recent seizure--  --------  Is the patient patient experiencing suicidal ideation? reports the following suicide factors: walk in front of a car     Homicidal ideation? denies current or recent homicidal ideation or behaviors.    Self-injurious behavior/urges? denies current or recent self injurious behavior or ideation.  ------  Was pt aggressive in the ED No  Was a code called No  Is the pt now cooperative? Yes  -------  Meds given in ED:   Medications   citalopram (celeXA) tablet 40 mg (40 mg Oral $Given 8/27/23 0809)   OLANZapine (zyPREXA) tablet 5 mg (5 mg Oral $Given 8/27/23 1008)   lisinopril (ZESTRIL) tablet 20 mg (20 mg Oral $Given 8/27/23 0809)   OLANZapine (zyPREXA) tablet 5 mg (5 mg Oral $Given 8/25/23 2326)   lisinopril (ZESTRIL) tablet 30 mg (30 mg Oral Not Given 8/26/23 0300)   amLODIPine (NORVASC) tablet 10 mg (10 mg Oral Not Given 8/26/23 0300)   citalopram (celeXA) tablet 40 mg (40 mg Oral $Given 8/26/23 0048)      Family present during ED course? No  Family currently present?  No    BACKGROUND  Does the patient have a cognitive impairment or developmental disability? No  Allergies:   Allergies   Allergen Reactions    Fish-Derived Products     Penicillins Hives    Penicillins    .   Social demographics are   Social History     Socioeconomic History    Marital status: Single     Spouse name: None    Number of children: None    Years of education: None    Highest education level: None   Tobacco Use    Smoking status: Every Day     Packs/day: 1.50     Types: Cigarettes    Smokeless tobacco: Never   Substance and Sexual Activity    Alcohol use: Yes     Comment: a few times a month    Drug use: Yes     Frequency: 3.0 times per week     Types: Marijuana, Methamphetamines     Comment: last use today   Social History Narrative    ** Merged History Encounter **         7/15/2020         ASSESSMENT  Labs results   Labs Ordered and Resulted from Time of ED Arrival to Time of ED Departure   DRUG ABUSE SCREEN 77 URINE (FL, RH, SH) - Abnormal       Result Value    Amphetamines Urine Screen Positive (*)     Barbituates Urine Screen Negative      Benzodiazepine Urine Screen Negative      Cannabinoids Urine Screen Negative      Opiates Urine Screen Negative      PCP Urine Screen Negative      Cocaine Urine Screen Negative     COMPREHENSIVE METABOLIC PANEL - Abnormal    Sodium 142      Potassium 3.9      Chloride 108 (*)     Carbon Dioxide (CO2) 29      Anion Gap 5 (*)     Urea Nitrogen 9.1      Creatinine 1.02      Calcium 8.8      Glucose 101 (*)     Alkaline Phosphatase 51      AST 16      ALT 11      Protein Total 5.7 (*)     Albumin 3.5      Bilirubin Total 0.4      GFR Estimate >90     ROUTINE UA WITH MICROSCOPIC REFLEX TO CULTURE - Abnormal    Color Urine Orange (*)     Appearance Urine Cloudy (*)     Glucose Urine Negative      Bilirubin Urine Negative      Ketones Urine Negative      Specific Gravity Urine 1.022      Blood Urine Negative      pH Urine 6.0      Protein Albumin Urine Negative       Urobilinogen Urine 2.0 (*)     Nitrite Urine Negative      Leukocyte Esterase Urine Negative      RBC Urine 0      WBC Urine 0     ACETAMINOPHEN LEVEL - Abnormal    Acetaminophen <5.0 (*)    COVID-19 VIRUS (CORONAVIRUS) BY PCR - Normal    SARS CoV2 PCR Negative     ETHYL ALCOHOL LEVEL - Normal    Alcohol ethyl <0.01     SALICYLATE LEVEL - Normal    Salicylate <0.3     CBC WITH PLATELETS AND DIFFERENTIAL    WBC Count 5.3      RBC Count 4.76      Hemoglobin 13.8      Hematocrit 42.4      MCV 89      MCH 29.0      MCHC 32.5      RDW 13.9      Platelet Count 192      % Neutrophils 48      % Lymphocytes 40      % Monocytes 8      % Eosinophils 3      % Basophils 1      % Immature Granulocytes 0      NRBCs per 100 WBC 0      Absolute Neutrophils 2.6      Absolute Lymphocytes 2.1      Absolute Monocytes 0.4      Absolute Eosinophils 0.2      Absolute Basophils 0.0      Absolute Immature Granulocytes 0.0      Absolute NRBCs 0.0        Imaging Studies: No results found for this or any previous visit (from the past 24 hour(s)).   Most recent vital signs /78   Pulse 55   Temp 97.6  F (36.4  C)   Resp 16   SpO2 97%    Abnormal labs/tests/findings requiring intervention:---   Pain control: pt had none  Nausea control: pt had none    RECOMMENDATION  Are any infection precautions needed (MRSA, VRE, etc.)? No If yes, what infection? --  ---  Does the patient have mobility issues? independently. If yes, what device does the pt use? ---  ---  Is patient on 72 hour hold or commitment? No If on 72 hour hold, have hold and rights been given to patient? No  Are admitting orders written if after 10 p.m. ?No  Tasks needing to be completed:---     Pernell Patton RN   ascom-- RN   [phone] [place] [facility]   [phone] [facility]

## 2023-08-28 NOTE — ED NOTES
Pt has been calm and cooperative thus far this am. He is asking appropriate questions about status of bed search. He is voiding regularly, eating well, and his thought process is clear.

## 2023-08-28 NOTE — ED PROVIDER NOTES
[facility] ED Mental Health Handoff Note:     Assuming care from: Dr Kevin Lainez    Brief HPI: 37 year old male signed out to me by the above provider. See initial ED Provider note for full details of the presentation.   In brief, patient presented to the ED for evaluation for suicidal ideation.    Home meds reviewed and ordered/administered: Yes  Medically stable for inpatient mental health admission: Yes.  Evaluated by mental health: Yes. The recommendation is for inpatient mental health treatment. Bed search in process  Safety concerns: At the time I received sign out, there were no safety concerns.    Hold Status:  Active Orders   N/A     Labs/Imaging:   No results found for this visit on 08/25/23 (from the past 24 hour(s)).      ED Meds:  Medications   citalopram (celeXA) tablet 40 mg (40 mg Oral $Given 8/28/23 0826)   OLANZapine (zyPREXA) tablet 5 mg (5 mg Oral $Given 8/27/23 1008)   lisinopril (ZESTRIL) tablet 20 mg (20 mg Oral $Given 8/28/23 0827)   OLANZapine (zyPREXA) tablet 5 mg (5 mg Oral $Given 8/25/23 2326)   lisinopril (ZESTRIL) tablet 30 mg (30 mg Oral Not Given 8/26/23 0300)   amLODIPine (NORVASC) tablet 10 mg (10 mg Oral Not Given 8/26/23 0300)   citalopram (celeXA) tablet 40 mg (40 mg Oral $Given 8/26/23 0048)     No orders to display       ED Course:       There were no significant events during my shift.  The patient was reevaluated today by the DEC .  Patient was still suicidal at the time of reevaluation.  Patient started on Seroquel this evening.  Inpatient psychiatric bed search is still in process.     Patient was signed out to the oncoming provider, Dr. Ruben Sutton at shift change    Impression:    ICD-10-CM    1. Severe episode of recurrent major depressive disorder, with psychotic features (H)  F33.3       2. Drug abuse (H)  F19.10           Plan:    Awaiting inpatient mental health admission/transfer.    I, Levon Zapata , am serving as a scribe to document services  personally performed by Mellissa Sotomayor DO based on my observation and the provider's statements to me. I, Mellissa Sotomayor, attest that Levon Zapata is acting in a scribe capacity, has observed my performance of the services and has documented them in accordance with my direction.     Mellissa Sotomayor DO  Emergency Medicine  [facility] EMERGENCY DEPARTMENT  [address] [place] [id number] [phone]                   Mellissa Sotomayor DO  08/28/23 7631

## 2023-08-29 ENCOUNTER — TELEPHONE (OUTPATIENT)
Dept: BEHAVIORAL HEALTH | Facility: CLINIC | Age: 38
End: 2023-08-29
Payer: MEDICAID

## 2023-08-29 ENCOUNTER — HOSPITAL ENCOUNTER (INPATIENT)
Facility: CLINIC | Age: 38
LOS: 1 days | Discharge: NURSING, REHAB OR LONG-TERM CARE | End: 2023-08-30
Attending: PSYCHIATRY & NEUROLOGY | Admitting: PSYCHIATRY & NEUROLOGY
Payer: MEDICAID

## 2023-08-29 VITALS
HEART RATE: 55 BPM | SYSTOLIC BLOOD PRESSURE: 148 MMHG | TEMPERATURE: 98.2 F | DIASTOLIC BLOOD PRESSURE: 93 MMHG | BODY MASS INDEX: 33.51 KG/M2 | WEIGHT: 247.1 LBS | RESPIRATION RATE: 18 BRPM | OXYGEN SATURATION: 96 %

## 2023-08-29 VITALS
HEART RATE: 64 BPM | BODY MASS INDEX: 33.71 KG/M2 | WEIGHT: 248.9 LBS | TEMPERATURE: 97 F | RESPIRATION RATE: 16 BRPM | SYSTOLIC BLOOD PRESSURE: 136 MMHG | OXYGEN SATURATION: 96 % | DIASTOLIC BLOOD PRESSURE: 86 MMHG | HEIGHT: 72 IN

## 2023-08-29 DIAGNOSIS — F29 PSYCHOSIS, UNSPECIFIED PSYCHOSIS TYPE (H): ICD-10-CM

## 2023-08-29 DIAGNOSIS — I10 ESSENTIAL HYPERTENSION: ICD-10-CM

## 2023-08-29 DIAGNOSIS — F33.3 SEVERE EPISODE OF RECURRENT MAJOR DEPRESSIVE DISORDER, WITH PSYCHOTIC FEATURES (H): Primary | ICD-10-CM

## 2023-08-29 DIAGNOSIS — F41.1 GAD (GENERALIZED ANXIETY DISORDER): ICD-10-CM

## 2023-08-29 PROBLEM — F19.10 DRUG ABUSE (H): Status: ACTIVE | Noted: 2023-08-29

## 2023-08-29 PROCEDURE — 99222 1ST HOSP IP/OBS MODERATE 55: CPT | Mod: AI | Performed by: PSYCHIATRY & NEUROLOGY

## 2023-08-29 PROCEDURE — 250N000013 HC RX MED GY IP 250 OP 250 PS 637: Performed by: EMERGENCY MEDICINE

## 2023-08-29 PROCEDURE — 93005 ELECTROCARDIOGRAM TRACING: CPT | Performed by: REGISTERED NURSE

## 2023-08-29 PROCEDURE — 250N000013 HC RX MED GY IP 250 OP 250 PS 637: Performed by: PSYCHIATRY & NEUROLOGY

## 2023-08-29 PROCEDURE — 124N000002 HC R&B MH UMMC

## 2023-08-29 RX ORDER — HYDROXYZINE HYDROCHLORIDE 25 MG/1
25 TABLET, FILM COATED ORAL EVERY 4 HOURS PRN
Status: CANCELLED | OUTPATIENT
Start: 2023-08-29

## 2023-08-29 RX ORDER — OLANZAPINE 10 MG/1
10 TABLET ORAL 3 TIMES DAILY PRN
Status: DISCONTINUED | OUTPATIENT
Start: 2023-08-29 | End: 2023-08-30 | Stop reason: HOSPADM

## 2023-08-29 RX ORDER — ACETAMINOPHEN 325 MG/1
650 TABLET ORAL EVERY 4 HOURS PRN
Status: CANCELLED | OUTPATIENT
Start: 2023-08-29

## 2023-08-29 RX ORDER — POLYETHYLENE GLYCOL 3350 17 G/17G
17 POWDER, FOR SOLUTION ORAL DAILY PRN
Status: DISCONTINUED | OUTPATIENT
Start: 2023-08-29 | End: 2023-08-30 | Stop reason: HOSPADM

## 2023-08-29 RX ORDER — MAGNESIUM HYDROXIDE/ALUMINUM HYDROXICE/SIMETHICONE 120; 1200; 1200 MG/30ML; MG/30ML; MG/30ML
30 SUSPENSION ORAL EVERY 4 HOURS PRN
Status: CANCELLED | OUTPATIENT
Start: 2023-08-29

## 2023-08-29 RX ORDER — POLYETHYLENE GLYCOL 3350 17 G/17G
17 POWDER, FOR SOLUTION ORAL DAILY PRN
Status: CANCELLED | OUTPATIENT
Start: 2023-08-29

## 2023-08-29 RX ORDER — OLANZAPINE 5 MG/1
10 TABLET ORAL 3 TIMES DAILY PRN
Status: CANCELLED | OUTPATIENT
Start: 2023-08-29

## 2023-08-29 RX ORDER — OLANZAPINE 5 MG/1
5 TABLET ORAL 2 TIMES DAILY
Status: DISCONTINUED | OUTPATIENT
Start: 2023-08-29 | End: 2023-08-30 | Stop reason: HOSPADM

## 2023-08-29 RX ORDER — TRAZODONE HYDROCHLORIDE 50 MG/1
50 TABLET, FILM COATED ORAL
Status: CANCELLED | OUTPATIENT
Start: 2023-08-29

## 2023-08-29 RX ORDER — OLANZAPINE 10 MG/2ML
10 INJECTION, POWDER, FOR SOLUTION INTRAMUSCULAR 3 TIMES DAILY PRN
Status: DISCONTINUED | OUTPATIENT
Start: 2023-08-29 | End: 2023-08-30 | Stop reason: HOSPADM

## 2023-08-29 RX ORDER — HYDROXYZINE HYDROCHLORIDE 25 MG/1
25 TABLET, FILM COATED ORAL EVERY 4 HOURS PRN
Status: DISCONTINUED | OUTPATIENT
Start: 2023-08-29 | End: 2023-08-30 | Stop reason: HOSPADM

## 2023-08-29 RX ORDER — TRAZODONE HYDROCHLORIDE 50 MG/1
50 TABLET, FILM COATED ORAL
Status: DISCONTINUED | OUTPATIENT
Start: 2023-08-29 | End: 2023-08-30 | Stop reason: HOSPADM

## 2023-08-29 RX ORDER — ACETAMINOPHEN 325 MG/1
650 TABLET ORAL EVERY 4 HOURS PRN
Status: DISCONTINUED | OUTPATIENT
Start: 2023-08-29 | End: 2023-08-30 | Stop reason: HOSPADM

## 2023-08-29 RX ORDER — CITALOPRAM HYDROBROMIDE 10 MG/1
40 TABLET ORAL DAILY
Status: DISCONTINUED | OUTPATIENT
Start: 2023-08-30 | End: 2023-08-30 | Stop reason: HOSPADM

## 2023-08-29 RX ORDER — NICOTINE 21 MG/24HR
1 PATCH, TRANSDERMAL 24 HOURS TRANSDERMAL DAILY
Status: DISCONTINUED | OUTPATIENT
Start: 2023-08-29 | End: 2023-08-30 | Stop reason: HOSPADM

## 2023-08-29 RX ORDER — OLANZAPINE 10 MG/2ML
10 INJECTION, POWDER, FOR SOLUTION INTRAMUSCULAR 3 TIMES DAILY PRN
Status: CANCELLED | OUTPATIENT
Start: 2023-08-29

## 2023-08-29 RX ORDER — LISINOPRIL 10 MG/1
20 TABLET ORAL DAILY
Status: DISCONTINUED | OUTPATIENT
Start: 2023-08-30 | End: 2023-08-30 | Stop reason: HOSPADM

## 2023-08-29 RX ORDER — LISINOPRIL 20 MG/1
20 TABLET ORAL DAILY
Status: CANCELLED | OUTPATIENT
Start: 2023-08-29

## 2023-08-29 RX ORDER — NICOTINE 21 MG/24HR
1 PATCH, TRANSDERMAL 24 HOURS TRANSDERMAL DAILY
Status: CANCELLED | OUTPATIENT
Start: 2023-08-29

## 2023-08-29 RX ORDER — CITALOPRAM HYDROBROMIDE 40 MG/1
40 TABLET ORAL DAILY
Status: CANCELLED | OUTPATIENT
Start: 2023-08-29

## 2023-08-29 RX ORDER — MAGNESIUM HYDROXIDE/ALUMINUM HYDROXICE/SIMETHICONE 120; 1200; 1200 MG/30ML; MG/30ML; MG/30ML
30 SUSPENSION ORAL EVERY 4 HOURS PRN
Status: DISCONTINUED | OUTPATIENT
Start: 2023-08-29 | End: 2023-08-30 | Stop reason: HOSPADM

## 2023-08-29 RX ADMIN — NICOTINE POLACRILEX 2 MG: 2 GUM, CHEWING BUCCAL at 19:59

## 2023-08-29 RX ADMIN — NICOTINE POLACRILEX 2 MG: 2 GUM, CHEWING BUCCAL at 17:35

## 2023-08-29 RX ADMIN — CITALOPRAM HYDROBROMIDE 40 MG: 40 TABLET ORAL at 07:47

## 2023-08-29 RX ADMIN — HYDROXYZINE HYDROCHLORIDE 25 MG: 25 TABLET, FILM COATED ORAL at 17:35

## 2023-08-29 RX ADMIN — OLANZAPINE 5 MG: 5 TABLET, FILM COATED ORAL at 20:24

## 2023-08-29 RX ADMIN — LISINOPRIL 20 MG: 20 TABLET ORAL at 07:47

## 2023-08-29 ASSESSMENT — ACTIVITIES OF DAILY LIVING (ADL)
ADLS_ACUITY_SCORE: 29
WEAR_GLASSES_OR_BLIND: NO
FALL_HISTORY_WITHIN_LAST_SIX_MONTHS: NO
ADLS_ACUITY_SCORE: 35
DOING_ERRANDS_INDEPENDENTLY_DIFFICULTY: NO
ADLS_ACUITY_SCORE: 29
ADLS_ACUITY_SCORE: 35
ADLS_ACUITY_SCORE: 29
TOILETING_ISSUES: NO
CONCENTRATING,_REMEMBERING_OR_MAKING_DECISIONS_DIFFICULTY: NO
ADLS_ACUITY_SCORE: 45
ADLS_ACUITY_SCORE: 28
CHANGE_IN_FUNCTIONAL_STATUS_SINCE_ONSET_OF_CURRENT_ILLNESS/INJURY: NO
ADLS_ACUITY_SCORE: 42
DIFFICULTY_EATING/SWALLOWING: NO
ADLS_ACUITY_SCORE: 35
WALKING_OR_CLIMBING_STAIRS_DIFFICULTY: NO
DRESSING/BATHING_DIFFICULTY: NO
ADLS_ACUITY_SCORE: 35

## 2023-08-29 ASSESSMENT — LIFESTYLE VARIABLES
AUDIT-C TOTAL SCORE: 0
SKIP TO QUESTIONS 9-10: 1

## 2023-08-29 NOTE — PLAN OF CARE
Problem: Adult Behavioral Health Plan of Care  Goal: Adheres to Safety Considerations for Self and Others  Outcome: Progressing    Patient was admitted from [facility] for suicidal ideation and CAH to [facility], brought self in to ED and signed in as voluntary to the unit. Per ED report pt is interested in medication management and has been off medications for some time. Per chart review pt has hx of chronic hallucinations and SI related to amphetamine use, last use was 2 days prior to ED admission per chart. Pt denies contracts for safety on the unit upon arrival, appears slightly distracted but not overtly RIS. Pt denies any thoughts of hurting himself or others at this time but reports he is very tired and would like to sleep and do interview at a later time.     Pt declined to notify anyone of his admission to the unit.     Plan: Patient was cooperative with search and admission process. Patient affect is restricted and blunted, eye contact is intermittent and intense at times. Patient was placed on suicide precautions. Patient is code 1 status 15. Will encourage patient to complete safety plan and participate in groups. Will encourage patient to take prescribed medications.

## 2023-08-29 NOTE — H&P
"[facility], [place]   Psychiatric History and Physical.    Chief complaint and reason for admission:     Auditory hallucinations, feeling unsafe in community, Suicidal ideation.     History of present illness: per DEC 's note: Chu Pimentel presents to the ED by  self. Patient is presenting to the ED for the following concerns: Worsening psychosocial stress, Depression, Suicidal ideation, Substance use, Anxiety.   Factors that make the mental health crisis life threatening or complex are:  Patient reports increased psychosocial stress including homelessness and being off his psychiatric medication for two months. He hasn't had his meds because he does not have a primary care provider. Patient is hearing voices telling him to do things such as kill himself. He does not feel safe outside of the hospital. Patient requests placement and would like inpatient psychiatric treatment to restart his medications.    During visit with this provider patient presented as somewhat irritable, but mostly cooperative. He reported abovementioned Auditory hallucinations, said that at times they tell him that he would never make it/get out, at times they tell him that they would cut his fingers or tell him to harm self, but not others. Said that he was hearing voices right at the moment of our visit, though, subjectively, he didn't appear to be exhibiting Auditory hallucinations. He reported being off his medications couple of months which seemed to coincide with time when he left [facility] CD treatment program in [place], [place]. He tested positively for Amphetamines and admitted during our visit that using meth makes voices worse. He at the same time told me repeatedly that he would like to be \"on my Adderall for ADHD, I never abuse it\". He reported feeling safe at this hospital, showed some interest in going to CD treatment, then, got very upset when I explained that meth and Adderall are, " "essentially, have the same chemical structure and most of doctors would not prescribe Controlled substance such as Adderall to someone who has addiction to meth. Chu said that it was wrong and he would like to find a doctor who would prescribe him Adderall here. When asked if he wanted to be discharged or stay at this hospital to receive care for his anxiety, Auditory hallucinations and depression, Chu said that he would stay.     Past psychiatric history: Previous diagnoses include MDD, CULLEN, and Meth Abuse. He has had inpatient hospitalization. Patient has had [facility] treatment 15 times with the most recent at [facility]. Patient admitted that he does not take [facility] treatment seriously. Has a history of frequent visits to ED - some of visits were clearly triggered by meth use. Also uses alcohol and THC. He per chart review also has a history of providing incorrect name and  because he was paranoid about others using his records against him. Has a history of being treated with Risperdal, Zyprexa, Celexa, Seroquel and possibly, others.     Past medical history:    Anxiety      Depression      Drug abuse (H)      HTN (hypertension)      Hypertension      Obesity      Past Surgical History[]Expand by Default         Past Surgical History:   Procedure Laterality Date    CHOLECYSTECTOMY            Family and social history: The patient reports he is . He has 2 children who he thinks live in [place], but he has not seen them for a long time: \"my ex doesn't allow me to see them\". Recently, he has been living in a shelter and on the streets. He is unemployed and has not been in communication with his family. He reports family history of mental health and substance use disorders.          Medications:      citalopram  40 mg Oral Daily    lisinopril  20 mg Oral Daily    nicotine  1 patch Transdermal Daily    nicotine   Transdermal Q8H    OLANZapine  5 mg Oral BID          Allergies:     Allergies   Allergen " Reactions    Fish-Derived Products     Penicillins Hives    Penicillins           Labs:   No results found for this or any previous visit (from the past 24 hour(s)).       Psychiatric Examination:     /81 (Patient Position: Sitting)   Pulse 103   Temp 99.1  F (37.3  C) (Tympanic)   Resp 16   Ht 1.829 m (6')   Wt 112.9 kg (248 lb 14.4 oz)   SpO2 98%   BMI 33.76 kg/m    Weight is 248 lbs 14.4 oz  Body mass index is 33.76 kg/m .                Sitting Orthostatic BP: 131/81      Sitting Orthostatic Pulse: 103 bpm                     Appearance: dressed in hospital scrubs and appeared as age stated  Attitude:  guarded and somewhat cooperative  Eye Contact:  intense  Mood:   sad, irritable  Affect:  intensity is heightened and constricted mobility  Speech:  decreased prosody  Psychomotor Behavior:  no evidence of tardive dyskinesia, dystonia, or tics and intact station, gait and muscle tone  Throught Process:  goal oriented  Associations:  no loose associations  Thought Content:  passive suicidal ideation present and auditory hallucinations present  Insight:  limited  Judgement:  poor  Oriented to:  time, person, and place  Attention Span and Concentration:  fair  Recent and Remote Memory:  fair       Review of systems:     For physical examination and 12 point review of system please, see note of Dr. Lainez from 8/25/23.  I reviewed that note and agree with it.         DIagnoses:     Substance induced psychosis vs Schizoaffective disorder. Generalized anxiety disorder. Stimulant (meth) use disorder. Self reported ADHD. Malingering is likely.         Plan:     Will restart on PTA Celexa and other PTA and add Zyprexa. Will clarify with patient if he really wants to enter CD treatment  in light of his prior repetitive unsuccessful treatments. Will continue to provide support and structure.          Total time spent was 55 minutes. Over 50% of times was spent counseling and coordination of care regarding coping  skills, medication and discharge planning.

## 2023-08-29 NOTE — PROGRESS NOTES
1045- Writer spoke w/ pts nurse, Jett who states patient is doing well, having hallucinations but is calm and cooperative. Writer offered to meet w/ pt over ipad and nurse states the EMS is there to transfer patient to inpatient unit now. Patient will be transferred ot [facility] at [facility].     Sandy Contreras JASPER  [facility]  [phone]

## 2023-08-29 NOTE — PHARMACY-ADMISSION MEDICATION HISTORY
"Admission Medication History Completed by Pharmacy    Please refer to pharmacy progress note on 8/26/23, \"Pharmacy-Admission Medication History\" under previous encounter at [facility] for information regarding prior to admission medications.    Kasey Davis, PharmD    "

## 2023-08-29 NOTE — ED NOTES
Pt was accepted at [facility] (station 30). Writer attempted to call report, but staff, Larry, states that they are unable to accept the pt until after 0800, per his charge nurse, Renetta.

## 2023-08-29 NOTE — TELEPHONE ENCOUNTER
R: Patient cleared and ready for behavioral bed placement: Yes    12:52am Intake called array.     1:16am Intake received a call from array accepting this pt to st [facility].     1:43am Intake called st [facility] and provided disposition to charge nurse. Nurse report: Charge will call on days.     1:56 Intake called [facility] and provided placement information to [facility].

## 2023-08-29 NOTE — CARE PLAN
08/29/23 1720   Patient Belongings   Did you bring any home meds/supplements to the hospital?  No   Belongings Search Yes   Clothing Search Yes   Second Staff Dong         Items in pt locker: pants, 2 shirts, shoe, vapor, community ID      Nothing was send to security    ........A               Admission:  I am responsible for any personal items that are not sent to the safe or pharmacy.  [facility] is not responsible for loss, theft or damage of any property in my possession.    Signature:  _________________________________ Date: _______  Time: _____                                              Staff Signature:  ____________________________ Date: ________  Time: _____      2nd Staff person, if patient is unable/unwilling to sign:    Signature: ________________________________ Date: ________  Time: _____     Discharge:  [facility] has returned all of my personal belongings:    Signature: _________________________________ Date: ________  Time: _____                                          Staff Signature:  ____________________________ Date: ________  Time: _____

## 2023-08-29 NOTE — ED NOTES
Report given to Zuleima ALMANZA at [place] station 30. Called transport-[facility] 1-2 hrs. Dispatcher states EMS very busy today

## 2023-08-29 NOTE — PLAN OF CARE
Problem: Suicidal Behavior  Goal: Suicidal Behavior is Absent or Managed  Outcome: Progressing         Pt has spent this shift watching movies in the lounge with peers and then went to bed early. Pt agreeable to finish his admission interview, denies any thoughts of self harm or suicide and reports he feels safe in the hospital. Pt reports he always hears hallucinations and medication does help but that his amphetamine use increases his hallucinations, denies any A/VH at time of assessment, appears mildly tense and anxious, slightly distracted but no overt  RIS noted. Pt denies thoughts to harm other people, requesting to speak to provider about discharge tomorrow morning, informed that provider will start seeing people mid morning. Pt reports increased appetite and having lack of access to food recently, has had several snacks this shift. Pt shaved his face and reports he feels like a new person. Pt noted to have some poor boundaries, loud when talking with peers about inappropriate subjects and substance use but was receptive to redirection by staff. Pt has been medication compliant, denies any medication SE or pain, was given education on morning lab draw and needing to be fasting. Will continue to monitor and support plan of care.

## 2023-08-29 NOTE — TELEPHONE ENCOUNTER
R:  No appropriate [facility] beds available.    [facility] Access Inpatient Bed Call Log 8/28/23 @5:23 PM   Intake has called facilities that have not updated their bed status within the last 12 hours.      Adults:         [facility] is posting 0 beds.    [facility] is posting 0 beds. [phone]; Per call @7:39 am to [facility]PS currently at capacity.    [facility] is posting 0 beds. [phone];    [facility] is posting 0 beds. [phone]; Per call @7:40 am to [facility] no beds available to check back in the afternoon.   [facility] is posting 0 beds. [phone]     [facility] is posting 0 beds. [phone]     [facility] is posting 0 beds. [phone]     [facility] is posting 0 beds. [phone]    [facility] is posting 0 beds. [phone]        [facility] is posting 0 beds. [phone]    [facility] is posting 0 beds. Mixed unit 12+. Low acuity only.  [phone]    [facility] is posting 0 beds. No aggression.  [phone]    [facility] is posting 0 beds. [phone] [id number]    [facility] is posting 1 bed. Low acuity. [phone]     [facility] is posting 3 bed.  Low acuity. [phone]    [facility] is posting 4 bed. Very low acuity. [phone].    [facility] is posting 1 bed. 72 hr hold preferred. Low acuity. [phone]    [facility] is posting 4 beds.  [phone]         [facility] is posting 0 beds. Voluntary only- no holds/commitments, No Hx of aggression, violence, or assault. No sexual offenders. No 72 hr holds. [phone]    [facility] is posting 2 beds. (Must have the cognitive ability to do programming. No aggressive or violent behavior or recent HX in the last 2 yrs. MH must be primary.) [phone]   [facility] is posting 0 beds. Low acuity only. Violence and aggression capped. [phone]. Per call @7:42 am no beds available  currently.   [facility] is posting 0 bed. Low acuity, Neg Covid. [phone]; Per call @7:45 am to [facility] no beds available.    [facility] is posting 2 bed. Covid neg. Vol only. Combined adolescent and adult unit. No aggressive or violent behavior. No registered sex offenders. [phone]; Per call @7:49 am to [facility] no beds available.   [facility] posting 0 beds. Negative covid.     [facility]: Posting 0 bed. (No hx of aggression/assault. No lines, drains or tubes. Does not provide detox or CD treatment).  [phone]. Per call @ 7:50 am to [facility] pending one poss discharge.   [facility] is posting 2 beds. Call for details. [phone]; Per call @7:51 am to [facility] 6 adult and 2 kid beds available for today.   [facility] is posting 4 beds. [phone]. Per call @7:53 am a couple of low acuity beds available.        Pt remains on work list pending appropriate bed availability.

## 2023-08-29 NOTE — ED NOTES
[facility] ED Mental Health Handoff Note:     Assuming care from: Dr Mellissa Vásquez HPI: 37 year old male signed out to me by the above provider. See initial ED Provider note for full details of the presentation.   In brief, patient presented with suicidal ideation     See Dr. Lainez's note from 8/26 for HPI     Home meds reviewed and ordered/administered: Yes  Medically stable for inpatient mental health admission: Yes.  Evaluated by mental health: Yes. The recommendation is for inpatient mental health treatment. Bed search in process  Safety concerns: At the time I received sign out, there were no safety concerns.    Hold Status:  Active Orders   N/A       Labs/Imaging:   No results found for this visit on 08/25/23 (from the past 24 hour(s)).      ED Meds:  Medications   citalopram (celeXA) tablet 40 mg (40 mg Oral $Given 8/28/23 0826)   OLANZapine (zyPREXA) tablet 5 mg (5 mg Oral $Given 8/28/23 1907)   lisinopril (ZESTRIL) tablet 20 mg (20 mg Oral $Given 8/28/23 0827)   OLANZapine (zyPREXA) tablet 5 mg (5 mg Oral $Given 8/25/23 2326)   lisinopril (ZESTRIL) tablet 30 mg (30 mg Oral Not Given 8/26/23 0300)   amLODIPine (NORVASC) tablet 10 mg (10 mg Oral Not Given 8/26/23 0300)   citalopram (celeXA) tablet 40 mg (40 mg Oral $Given 8/26/23 0048)   nicotine (NICORETTE) gum 2 mg (2 mg Buccal $Given 8/28/23 2041)     No orders to display       ED Course:       There were no significant events during my shift.    Patient was signed out to the oncoming provider    Impression:    ICD-10-CM    1. Severe episode of recurrent major depressive disorder, with psychotic features (H)  F33.3       2. Drug abuse (H)  F19.10           Plan:    Awaiting inpatient mental health admission/transfer.    Ruben Sutton DO  [facility] EMERGENCY DEPARTMENT  [address] [place] [id number]  [phone]                     Ruben Sutton DO  08/29/23 0129

## 2023-08-29 NOTE — CONSULTS
Name: Chu Pimentel  : 1985  Date: 2023  Time: 1:15am  Location of patient: [facility]  Location of doctor: [place]  Spoke with: Jessica  HPI:  The patient is a 37 year old male with a past psychiatric history of MDD, CULLEN, ADHD, and methamphetamine abuse who presented to the ED for hallucinations and SI after being off his medications for 2 months. He has AH telling him to kill himself and he has a plan to jump off a tree and have a car hit him. He admits to methamphetamine use with last use 2 days ago with a positive UDS. He denies any history of self-harm or suicide attempts. His labs were noncontributory otherwise.   Plan/Recommendations: Voluntary admission to behavioral health

## 2023-08-29 NOTE — ED NOTES
Pt supplied with a remote for the TV in the room. Chu has communicated that he is starting to go a little stir crazy in the small ED room while awaiting placement.

## 2023-08-29 NOTE — ED NOTES
Called to give report, was told they are busy passing meds and unable to take report at this time. State they will call us at 0930 today for report. Asked if anyone else is available to take report and was again told that they will call us at 0930 for report

## 2023-08-29 NOTE — ED NOTES
[facility] ED Mental Health Handoff Note:     Assuming care from: Dr Ruben Sutton    Brief HPI: 37 year old male signed out to me by the above provider. See initial ED Provider note for full details of the presentation.   In brief, patient having auditory hallucinations and suicidal thoughts for several months.  Voices tell him his fingers are going to get cut off if he does not comply.    Home meds reviewed and ordered/administered: Yes  Medically stable for inpatient mental health admission: Yes.  Evaluated by mental health: Yes. The recommendation is for inpatient mental health treatment. Bed search in process  Safety concerns: At the time I received sign out, there were no safety concerns.    Hold Status:  Active Orders   N/A            Labs/Imaging:   No results found for this visit on 08/25/23 (from the past 24 hour(s)).      ED Meds:  Medications   citalopram (celeXA) tablet 40 mg (40 mg Oral $Given 8/29/23 0747)   OLANZapine (zyPREXA) tablet 5 mg (5 mg Oral $Given 8/28/23 1907)   lisinopril (ZESTRIL) tablet 20 mg (20 mg Oral $Given 8/29/23 0747)   OLANZapine (zyPREXA) tablet 5 mg (5 mg Oral $Given 8/25/23 2326)   lisinopril (ZESTRIL) tablet 30 mg (30 mg Oral Not Given 8/26/23 0300)   amLODIPine (NORVASC) tablet 10 mg (10 mg Oral Not Given 8/26/23 0300)   citalopram (celeXA) tablet 40 mg (40 mg Oral $Given 8/26/23 0048)   nicotine (NICORETTE) gum 2 mg (2 mg Buccal $Given 8/28/23 2041)     No orders to display       ED Course:       There were no significant events during my shift.      Impression:    ICD-10-CM    1. Severe episode of recurrent major depressive disorder, with psychotic features (H)  F33.3       2. Drug abuse (H)  F19.10           Plan:    Admitted/transferred to inpatient mental health bed.  [place].    Neetu Patel MD  [facility] EMERGENCY DEPARTMENT  [facility] [facility] [id number] [phone]                     Neetu Patel MD  08/29/23  8125

## 2023-08-30 PROCEDURE — 250N000013 HC RX MED GY IP 250 OP 250 PS 637: Performed by: PSYCHIATRY & NEUROLOGY

## 2023-08-30 PROCEDURE — 99239 HOSP IP/OBS DSCHRG MGMT >30: CPT | Performed by: PSYCHIATRY & NEUROLOGY

## 2023-08-30 RX ORDER — CITALOPRAM HYDROBROMIDE 40 MG/1
40 TABLET ORAL DAILY
Qty: 30 TABLET | Refills: 1 | Status: SHIPPED | OUTPATIENT
Start: 2023-08-31 | End: 2023-10-25

## 2023-08-30 RX ORDER — HYDROXYZINE HYDROCHLORIDE 25 MG/1
25 TABLET, FILM COATED ORAL EVERY 4 HOURS PRN
Qty: 60 TABLET | Refills: 1 | Status: SHIPPED | OUTPATIENT
Start: 2023-08-30 | End: 2023-10-25

## 2023-08-30 RX ORDER — OLANZAPINE 10 MG/1
10 TABLET ORAL 3 TIMES DAILY PRN
Qty: 45 TABLET | Refills: 1 | Status: SHIPPED | OUTPATIENT
Start: 2023-08-30 | End: 2023-10-25

## 2023-08-30 RX ORDER — OLANZAPINE 5 MG/1
5 TABLET ORAL 2 TIMES DAILY
Qty: 60 TABLET | Refills: 1 | Status: SHIPPED | OUTPATIENT
Start: 2023-08-30 | End: 2023-10-25

## 2023-08-30 RX ORDER — LISINOPRIL 20 MG/1
20 TABLET ORAL DAILY
Qty: 30 TABLET | Refills: 1 | Status: SHIPPED | OUTPATIENT
Start: 2023-08-31 | End: 2023-10-25

## 2023-08-30 RX ADMIN — OLANZAPINE 5 MG: 5 TABLET, FILM COATED ORAL at 08:28

## 2023-08-30 RX ADMIN — LISINOPRIL 20 MG: 10 TABLET ORAL at 08:28

## 2023-08-30 RX ADMIN — NICOTINE 1 PATCH: 21 PATCH, EXTENDED RELEASE TRANSDERMAL at 08:38

## 2023-08-30 RX ADMIN — CITALOPRAM HYDROBROMIDE 40 MG: 10 TABLET ORAL at 08:28

## 2023-08-30 ASSESSMENT — ACTIVITIES OF DAILY LIVING (ADL)
ADLS_ACUITY_SCORE: 28
DRESS: INDEPENDENT
LAUNDRY: WITH SUPERVISION
ORAL_HYGIENE: INDEPENDENT
HYGIENE/GROOMING: INDEPENDENT
ADLS_ACUITY_SCORE: 28

## 2023-08-30 NOTE — DISCHARGE SUMMARY
Psychiatric Discharge Summary    Chu Pimentel MRN# [id number]   Age: 37 year old YOB: 1985     Date of Admission:  8/29/2023  Date of Discharge:  8/30/2023  Admitting Physician:  Leonel Anderson MD  Discharge Physician:  Leonel Anderson MD (Contact: [phone])         Event Leading to Hospitalization:     Auditory hallucinations, feeling unsafe in community, Suicidal ideation.      History of present illness: per DEC 's note: Chu Pimentel presents to the ED by  self. Patient is presenting to the ED for the following concerns: Worsening psychosocial stress, Depression, Suicidal ideation, Substance use, Anxiety.   Factors that make the mental health crisis life threatening or complex are:  Patient reports increased psychosocial stress including homelessness and being off his psychiatric medication for two months. He hasn't had his meds because he does not have a primary care provider. Patient is hearing voices telling him to do things such as kill himself. He does not feel safe outside of the hospital. Patient requests placement and would like inpatient psychiatric treatment to restart his medications.     During visit with this provider patient presented as somewhat irritable, but mostly cooperative. He reported abovementioned Auditory hallucinations, said that at times they tell him that he would never make it/get out, at times they tell him that they would cut his fingers or tell him to harm self, but not others. Said that he was hearing voices right at the moment of our visit, though, subjectively, he didn't appear to be exhibiting Auditory hallucinations. He reported being off his medications couple of months which seemed to coincide with time when he left [facility] treatment program in [place], [place]. He tested positively for Amphetamines and admitted during our visit that using meth makes voices worse. He at the same time told me repeatedly that he would like to be  "\"on my Adderall for ADHD, I never abuse it\". He reported feeling safe at this hospital, showed some interest in going to CD treatment, then, got very upset when I explained that meth and Adderall are, essentially, have the same chemical structure and most of doctors would not prescribe Controlled substance such as Adderall to someone who has addiction to meth. Chu said that it was wrong and he would like to find a doctor who would prescribe him Adderall here. When asked if he wanted to be discharged or stay at this hospital to receive care for his anxiety, Auditory hallucinations and depression, Chu said that he would stay.           See Admission note by by admitting physician/nurse-practitioner Leonle Anderson MD for additional details.          DIagnoses:     Substance induced psychosis vs Schizoaffective disorder. Generalized anxiety disorder. Stimulant (meth) use disorder. Self reported ADHD. Malingering is likely.          Labs:      Latest Reference Range & Units Most Recent 08/25/23 23:27 08/26/23 23:03   Sodium 136 - 145 mmol/L 142  8/26/23 23:03  142   Potassium 3.4 - 5.3 mmol/L 3.9  8/26/23 23:03  3.9   Chloride 98 - 107 mmol/L 108 (H)  8/26/23 23:03  108 (H)   Carbon Dioxide (CO2) 22 - 29 mmol/L 29  8/26/23 23:03  29   Urea Nitrogen 6.0 - 20.0 mg/dL 9.1  8/26/23 23:03  9.1   Creatinine 0.67 - 1.17 mg/dL 1.02  8/26/23 23:03  1.02   GFR Estimate >60 mL/min/1.73m2 >90  8/26/23 23:03  >90   Calcium 8.6 - 10.0 mg/dL 8.8  8/26/23 23:03  8.8   Anion Gap 7 - 15 mmol/L 5 (L)  8/26/23 23:03  5 (L)   Albumin 3.5 - 5.2 g/dL 3.5  8/26/23 23:03  3.5   Protein Total 6.4 - 8.3 g/dL 5.7 (L)  8/26/23 23:03  5.7 (L)   Alkaline Phosphatase 40 - 129 U/L 51  8/26/23 23:03  51   ALT 0 - 70 U/L 11  8/26/23 23:03  11   AST 0 - 45 U/L 16  8/26/23 23:03  16   Bilirubin Total <=1.2 mg/dL 0.4  8/26/23 23:03  0.4   CRP Inflammation <5.00 mg/L 3.70  3/11/23 19:38     Glucose 70 - 99 mg/dL 101 (H)  8/26/23 23:03  101 (H) "   Hemoglobin A1C <5.7 % 5.7 (H)  3/12/23 14:26     GLUCOSE BY METER POCT 70 - 99 mg/dL 107 (H)  3/16/23 10:38     WBC 4.0 - 11.0 10e3/uL 5.3  8/26/23 23:03  5.3   Hemoglobin 13.3 - 17.7 g/dL 13.8  8/26/23 23:03  13.8   Hematocrit 40.0 - 53.0 % 42.4  8/26/23 23:03  42.4   Platelet Count 150 - 450 10e3/uL 192  8/26/23 23:03  192   RBC Count 4.40 - 5.90 10e6/uL 4.76  8/26/23 23:03  4.76   MCV 78 - 100 fL 89  8/26/23 23:03  89   MCH 26.5 - 33.0 pg 29.0  8/26/23 23:03  29.0   MCHC 31.5 - 36.5 g/dL 32.5  8/26/23 23:03  32.5   RDW 10.0 - 15.0 % 13.9  8/26/23 23:03  13.9   % Neutrophils % 48  8/26/23 23:03  48   % Lymphocytes % 40  8/26/23 23:03  40   % Monocytes % 8  8/26/23 23:03  8   % Eosinophils % 3  8/26/23 23:03  3   % Basophils % 1  8/26/23 23:03  1   Absolute Basophils 0.0 - 0.2 10e3/uL 0.0  8/26/23 23:03  0.0   Absolute Eosinophils 0.0 - 0.7 10e3/uL 0.2  8/26/23 23:03  0.2   Absolute Immature Granulocytes <=0.4 10e3/uL 0.0  8/26/23 23:03  0.0   Absolute Lymphocytes 0.8 - 5.3 10e3/uL 2.1  8/26/23 23:03  2.1   Absolute Monocytes 0.0 - 1.3 10e3/uL 0.4  8/26/23 23:03  0.4   % Immature Granulocytes % 0  8/26/23 23:03  0   Absolute Neutrophils 1.6 - 8.3 10e3/uL 2.6  8/26/23 23:03  2.6   Absolute NRBCs 10e3/uL 0.0  8/26/23 23:03  0.0   NRBCs per 100 WBC <1 /100 0  8/26/23 23:03  0   D-Dimer Quantitative 0.00 - 0.50 ug/mL FEU 0.31  3/11/23 22:08     Color Urine Colorless, Straw, Light Yellow, Yellow  Orange !  8/25/23 23:26     Appearance Urine Clear  Cloudy !  8/25/23 23:26     Glucose Urine Negative mg/dL Negative  8/25/23 23:26     Bilirubin Urine Negative  Negative  8/25/23 23:26     Ketones Urine Negative mg/dL Negative  8/25/23 23:26     Specific Gravity Urine 1.001 - 1.030  1.022  8/25/23 23:26     pH Urine 5.0 - 7.0  6.0  8/25/23 23:26     Protein Albumin Urine Negative mg/dL Negative  8/25/23 23:26     Urobilinogen mg/dL <2.0 mg/dL 2.0 !  8/25/23 23:26     Nitrite Urine Negative  Negative  8/25/23 23:26     Blood  "Urine Negative  Negative  8/25/23 23:26     Leukocyte Esterase Urine Negative  Negative  8/25/23 23:26     WBC Urine <=5 /HPF 0  8/25/23 23:26     RBC Urine <=2 /HPF 0  8/25/23 23:26     SARS CoV2 PCR Negative  Negative  8/25/23 23:27 Negative    Salicylate mg/dL <0.3  8/26/23 23:03  <0.3   Alcohol Breath Test 0.00 - 0.01  0.00  7/24/23 03:54     Amphetamine Qual Urine Screen Negative  Screen Positive !  8/25/23 23:26     Fentanyl Qual Urine Screen Negative  Screen Negative  3/27/23 08:00     Benzodiazepine Urine Screen Negative  Screen Negative  8/25/23 23:26     Opiates Qualitative Urine Screen Negative  Screen Negative  8/25/23 23:26     PCP Urine Screen Negative  Screen Negative  8/25/23 23:26     Cannabinoids Qual Urine Screen Negative  Screen Negative  8/25/23 23:26     Barbiturates Qual Urine Screen Negative  Screen Negative  8/25/23 23:26     Alcohol ethyl <=0.01 g/dL <0.01  8/26/23 23:03  <0.01   Cocaine Urine Screen Negative  Screen Negative  8/25/23 23:26     Acetaminophen 10.0 - 30.0 ug/mL <5.0 (L)  8/26/23 23:03  <5.0 (L)   (H): Data is abnormally high  (L): Data is abnormally low  !: Data is abnormal         Consults:   No consultations were requested during this admission         Hospital Course:     Chu Pimentel was [facility] to [facility] with attending Leonel Anderson MD  as a voluntary patient. The patient was placed under status 15 (15 minute checks) to ensure patient safety. Patient in fact, was pretty irritable and spent most of his hospital stay in his room/bed. During his first visit with this provider patient reported command Auditory hallucinations telling him to harm self and admitted that using meth made hallucinations worse. He at the same time, was very firm on need to put him on \"Adderall for my ADHD, I have never abused it\". I explained Chu that Adderall is a controlled substance similar in chemical structure and effect to meth and was not recommended to any with history of " meth abuse. He appeared to be even more irritated and asked to talk to another doctor who would prescribe him Adderall, but, then, agreed to stay at this hospital. He was put on his PTA meds. Shortly after our meeting Chu inquired if he could be discharged to community. He agreed to stay at this hospital until morning and today we had another meeting with him. This time Chu was somewhat more cooperative and open. He admitted that his main need is to get his own housing. He openly said that it is his own fault that he doesn't stay in CD treatment programs and shelters long enough to be referred to sober houses or for independent housing. Confirmed that using meth makes voices worse, but still declined suggestion to go to CD treatment. He denied Suicidal ideation, Homicidal thoughts, reported minimal Auditory hallucinations and contracted for safety. He asked to discharge him ASAP so he could go to a shelter and get bed there.     Chu Pimentel did not participate in groups and was not visible in the milieu.     The patient's symptoms of psychosis improved.     Chu Pimentel was released to  a shelter . At the time of discharge Chu Pimentel was determined to not be a danger to himself or others.          Discharge Medications:     Discharge Medication List as of 8/30/2023 11:15 AM        START taking these medications    Details   citalopram (CELEXA) 40 MG tablet Take 1 tablet (40 mg) by mouth daily, Disp-30 tablet, R-1, E-Prescribe      hydrOXYzine (ATARAX) 25 MG tablet Take 1 tablet (25 mg) by mouth every 4 hours as needed for anxiety, Disp-60 tablet, R-1, E-Prescribe      lisinopril (ZESTRIL) 20 MG tablet Take 1 tablet (20 mg) by mouth daily, Disp-30 tablet, R-1, E-Prescribe      !! OLANZapine (ZYPREXA) 10 MG tablet Take 1 tablet (10 mg) by mouth 3 times daily as needed (hallucinations), Disp-45 tablet, R-1, E-Prescribe      !! OLANZapine (ZYPREXA) 5 MG tablet Take 1 tablet (5 mg) by mouth 2 times daily,  Disp-60 tablet, R-1, E-Prescribe       !! - Potential duplicate medications found. Please discuss with provider.               Psychiatric Examination:   Appearance:  dressed in hospital scrubs and appeared as age stated  Attitude:  guarded  Eye Contact:  fair  Mood:  anxious and better  Affect:  constricted mobility  Speech:  decreased prosody  Psychomotor Behavior:  no evidence of tardive dyskinesia, dystonia, or tics and intact station, gait and muscle tone  Thought Process:  linear and goal oriented  Associations:  no loose associations  Thought Content:  no evidence of suicidal ideation or homicidal ideation and auditory hallucinations present  Insight:  limited  Judgment:  limited  Oriented to:  time, person, and place  Attention Span and Concentration:  fair  Recent and Remote Memory:  fair  Language: Able to name objects, Able to repeat phrases, and Able to read and write  Fund of Knowledge: appropriate  Muscle Strength and Tone: normal  Gait and Station: Normal         Discharge Plan:     Was discharged to a shelter [facility]. Meds were electronically sent to [facility] [address] [facility].        Attestation:  The patient has been seen and evaluated by me,  Leonel Anderson MD     Total time spent was 37 minutes. Over 50% of times was spent counseling and coordination of care regarding coping skills, medication and discharge planning.

## 2023-08-30 NOTE — PLAN OF CARE
INITIAL PSYCHOSOCIAL ASSESSMENT AND NOTE    Information for assessment was obtained from:       [x]Patient     []Parent     []Community provider    [x]Hospital records   []Other     []Guardian       Presenting Problem:  Patient is a 37 year old male who uses he/him. Patient was admitted to  [facility] voluntarily on 8/29/2023.     Presenting issues and presentation for admit: Patient admitted with depression, suicidal ideation, command auditory hallucinations, plan to jump off a bridge      The following areas have been assessed:    History of Mental Health and Chemical Dependency:  Mental Health History:  Patient has a historical diagnosis of Depression, ADHD, Anxiety, meth abuse. The patient does not have a history of suicide attempt. He has engaged in mental health services in the past    Previous psychiatric hospitalizations and treatments:   Multiple prior admissions    Substance Use History  15 CD treatments, most recent [facility]      Patient's current relationship status is   .   Patient reported having children that he does not  have contact with.      Family Description (Constellation, significant information and events, Family Psychiatric History):   Patient endorses mental illness in several family members    Significant Medical issues, Life events or Trauma history:   Childhood trauma      Living Situation:  Patient's current living/housing situation is homelessness. And plans to access services at [facility] after discharge .        Educational Background:    Patient's highest education level was some high school but no degree. Patient reports they are  able to understand written materials.     Occupational and Financial Status:     Patient is currently unemployed.  Patient reports no income.Patient does identify finances as a current stressor. They are insured under Medical Assistance.    Occupational History: Not  employed    Legal Concerns (current or past history):       Current Concerns: none     Past History: none noted      Legal Status:  Voluntary     Commitment History: none        Service History: none    Ethnic/Cultural/Spiritual considerations:   The patient describes their cultural background as Black/, heterosexual, cisgender.  Contextual influences on patient's health include housing instability and lack of social support.   Patient identified their preferred language to be English. Patient reported they do not need the assistance of an .      Social Functioning (organizations, interests, support system):   In their free time, patient reports they like to play basketball, go for walks and go swimming.      Patient identified no one as part of their support system.  Patient identified the quality of these relationships as poor.       Current Treatment Providers are:  None        GOALS FOR HOSPITALIZATION:  What do patient want to accomplish during this hospitalization to make things better for the patient.?   Patient priorities:  The patient reported that what is most important to them is to discharge today. He declines help with scheduling appointments, only requests a bus token to get to [facility].  He indicates he plans to get help with housing, appointments etc there.

## 2023-08-30 NOTE — PROVIDER NOTIFICATION
08/30/23 1125   Individualization/Patient Specific Goals   Patient Personal Strengths self-reliant   Patient Vulnerabilities substance abuse/addiction   Anxieties, Fears or Concerns wanted adderoll   Individualized Care Needs Immediate discharge per pt request   Patient/Family-Specific Goals (Include Timeframe) obtain adderoll   Interprofessional Rounds   Summary Pt was admitted yesterday with reports of suicidal ideation and auditory hallucinations. He requested adderoll and was denied this due to methamphetmine abuse. He was voluntary. He brought himself to the ED. He has a history of 15 CD treatments. He requested immediate discharge.   Participants nursing;OT;psychiatrist;[facility]   Behavioral Team Discussion   Participants Leonel Anderson MD, Katelin HUBER, Shantal Rice RN, Mila Cutler OT   Progress Pt was assessed as being appropriate for discharge.   Anticipated length of stay 1 day   Continued Stay Criteria/Rationale discharged   Medical/Physical no active issues were discussed   Precautions suicide   Plan Immediate discharge   Rationale for change in precautions or plan na   Safety Plan per unit protocol   Anticipated Discharge Disposition homeless shelter

## 2023-08-30 NOTE — DISCHARGE INSTRUCTIONS
Behavioral Discharge Planning and Instructions    Summary: You were admitted on 8/29/2023  due to Depression, Psychotic Symptomology, Suicidal Ideations, and Chemical Use Issues.  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 Generalized anxiety disorder. Stimulant (meth) use disorder      Health Care Follow-up:   If no appointments scheduled, explain:  You indicate that you plan to get help scheduling aftercare and other services at [facility].  Your prescriptions have been sent to the [facility] of your choice.       Information will be faxed to your outpatient providers to ensure a healthy continuity of care for you.     Attend all scheduled appointments with your outpatient providers. Call at least 24 hours in advance if you need to reschedule an appointment to ensure continued access to your outpatient providers.     Major Treatments, Procedures and Findings:  You were provided with: a psychiatric assessment, assessed for medical stability, and medication evaluation and/or management    Symptoms to Report: feeling more aggressive, increased confusion, losing more sleep, mood getting worse, or thoughts of suicide    Early warning signs can include: increased depression or anxiety sleep disturbances increased thoughts or behaviors of suicide or self-harm     Safety and Wellness:  Take all medicines as directed.  Make no changes unless your doctor suggests them.      Follow treatment recommendations.  Refrain from alcohol and non-prescribed drugs.  Ask your support system to help you reduce your access to items that could harm yourself or others. Items could include:  Firearms  Medicines (both prescribed and over-the-counter)  Knives and other sharp objects  Ropes and like materials  Car keys  If there is a concern for safety, call 911. If there is a concern for safety,  "call 911.    Resources:   Crisis Intervention: 683.333.2870 or 266-284-9754 (TTY: 313.902.9921).  Call anytime for help.  National [place] on Mental Illness (www.[place].adali.org): 328.110.6529 or 176-270-3635.  [facility] Crisis (COPE) Response - Adult [phone]  [facility] Crisis Response - Adult [phone]  Text 4 Life: txt \"LIFE\" to 42735 for immediate support and crisis intervention  Crisis text line: Text \"[place]\" to 561207. Free, confidential, 24/7.       [facility] ([place] on Mental Illness) improves the lives of children and adults with mental illnesses and their families by providing free classes on mental illnesses and support groups for adults with mental illnesses, parents and family members. For more information: Phone: 435.407.9454 Toll free: 5-235-MVVC-HELPS Website: www.BuzzMob.orghttp://www.namihelps.org/      General Medication Instructions:   See your medication sheet(s) for instructions.   Take all medicines as directed.  Make no changes unless your doctor suggests them.   Go to all your doctor visits.  Be sure to have all your required lab tests. This way, your medicines can be refilled on time.  Do not use any drugs not prescribed by your doctor.  Avoid alcohol.    Advance Directives:   Scanned document on file with [facility] [employer]? No scanned doc  Is document scanned? Pt states no documents  Honoring Choices Your Rights Handout: Informed and given  Was more information offered? Materials given    The Treatment team has appreciated the opportunity to work with you. If you have any questions or concerns about your recent admission, you can contact the unit which can receive your call 24 hours a day, 7 days a week. They will be able to get in touch with a Provider if needed. The unit number is [phone] .    "

## 2023-09-01 LAB
ATRIAL RATE - MUSE: 51 BPM
ATRIAL RATE - MUSE: 52 BPM
DIASTOLIC BLOOD PRESSURE - MUSE: NORMAL MMHG
DIASTOLIC BLOOD PRESSURE - MUSE: NORMAL MMHG
INTERPRETATION ECG - MUSE: NORMAL
INTERPRETATION ECG - MUSE: NORMAL
P AXIS - MUSE: 18 DEGREES
P AXIS - MUSE: 6 DEGREES
PR INTERVAL - MUSE: 176 MS
PR INTERVAL - MUSE: 176 MS
QRS DURATION - MUSE: 100 MS
QRS DURATION - MUSE: 96 MS
QT - MUSE: 432 MS
QT - MUSE: 434 MS
QTC - MUSE: 400 MS
QTC - MUSE: 401 MS
R AXIS - MUSE: -15 DEGREES
R AXIS - MUSE: -4 DEGREES
SYSTOLIC BLOOD PRESSURE - MUSE: NORMAL MMHG
SYSTOLIC BLOOD PRESSURE - MUSE: NORMAL MMHG
T AXIS - MUSE: 11 DEGREES
T AXIS - MUSE: 6 DEGREES
VENTRICULAR RATE- MUSE: 51 BPM
VENTRICULAR RATE- MUSE: 52 BPM

## 2023-10-10 ENCOUNTER — TELEPHONE (OUTPATIENT)
Dept: BEHAVIORAL HEALTH | Facility: CLINIC | Age: 38
End: 2023-10-10
Payer: MEDICAID

## 2023-10-10 NOTE — TELEPHONE ENCOUNTER
Coordinator forwarded medication management referral to TC RN pool since referral has been made for next level of care.  Patient will be contacted for Medication Management scheduling once reviewed by TC RN. Reply sent to referral source.    Alyx Zacarias  Transition Clinic Coordinator  10/10/23 10:46 AM

## 2023-10-10 NOTE — TELEPHONE ENCOUNTER
----- Message from Alfredo Olivas MD sent at 10/10/2023 10:35 AM CDT -----  Regarding: Referral  Transition Clinic Referral   [place]         Please Check Type of Referral Requested:       ____THERAPY: The Transition clinic is able to schedule patients without current medical insurance; these patient will be referred to our Social Work Care Coordinator for Medical Insurance              Assistance. We are open for referral for psychotherapy. Patient is referred from:        ___X_MEDICATION:  Referrals for Medication are ONLY accepted from the following areas (select): Other..... STANDARD PRIORITY                                       Suboxone and Opioid Management Referrals are automatically denied. TC Psychiatry cannot see patient without active medical insurance.      Next level of psychiatry care must be within 12 months.  TC Psychiatry cannot accept patient with next level of care scheduled with PCP.  The transition clinic cannot follow patients who are on a restricted recipient program.    GUARDIAN: If your patient is not their own Guardian, please provide the following:    Guardian Name:  Guardian Contact Information (Phone & Email) :  Guardian Address:     FOSTER CARE PROVIDER: If your patient lives at a Licensed Foster Care, please provide the following:    Foster Provider:  Foster Provider Contact Information (Phone & Email):  Foster Provider Address:       Referring Provider Contact Name: Alfredo Olivas MD; Phone Number: [phone]      Reason for Transition Clinic Referral: Patient was discharged from psychiatric hospitalization 8/30/2023-needing stabilization    Next Level of Care Patient Will Be Transitioned To: Pending-writer is creating a referral to get community psychiatry appointment -kindly Request TC assistance in establishing next level of care  Provider(s) pending  Location pending  Date/Time pending  Once referral is completed and next level of care is obtained please schedule with  TC Psychiatry/[facility]    What Would Be Helpful from the Transition Clinic: bridging med eval     Needs: NO    Does Patient Have Access to Technology: Unknown    Patient E-mail Address: [email or web address]    Current Patient Phone Number: [phone];     Clinician Gender Preference (if applicable): NO    Patient location preference: likely in person    Alfredo Olivas MD

## 2023-10-10 NOTE — TELEPHONE ENCOUNTER
Coordinator clarified with Dr. Olivas that TC Coordinator should reach out to patient to schedule a community Psychiatrist.    Coordinator made first attempt to reach patient, left voicemail asking for call back to TC. Will postpone to try again tomorrow.     Alyx Zacarias  Transition Clinic Coordinator  10/10/23 1:37 PM

## 2023-10-10 NOTE — TELEPHONE ENCOUNTER
Mental Health &Addiction (MH&A)Transition Clinic (TC):     Provides Patient Support While Waiting to Access Programmatic and Outpatient MH&A Care and Provides Select Crisis Assessment Services     NURSING Referral Review  _________________________________________    This RN has reviewed this Medication Management referral to the Transition Clinic and deemed the referral   [] Appropriate  [] Inappropriate   [x]Consulting x (Tier 1) patient needs next level of care appointment prior to scheduling with Transition Clinic. RN confirms referral in place on [facility] for Long term psychiatry. RN routing to Behavioral Health Access requesting patient to be scheduled with Long term psychiatry. RN forwarding to Transition Clinic provider for awareness and review regarding patient scheduling.    Based on the following criteria:    Pt has a psychiatric provider (or pending plan) in place for future prescribing: No     Timeframe until pt's scheduled psychiatry appointment is less than 6 months: No     Pt takes psychiatric medications: Yes:     citalopram (CELEXA) 40 MG tablet  hydrOXYzine (ATARAX) 25 MG tablet  OLANZapine (ZYPREXA) 10 MG tablet  OLANZapine (ZYPREXA) 5 MG tablet       Pt's goals seem to align with this temporary service: Yes: Transition Clinic to bridge psychiatric care and psychiatric medication management until next Level of Care.         Any additional pertinent information regarding this referral: Patient was seen in the ED on 8/29/23. Per ED HPI. Chu Pimentel presents to the ED by  self. Patient is presenting to the ED for the following concerns: Worsening psychosocial stress, Depression, Suicidal ideation, Substance use, Anxiety.   Factors that make the mental health crisis life threatening or complex are:  Patient reports increased psychosocial stress including homelessness and being off his psychiatric medication for two months. He hasn't had his meds because he does not have a primary care provider.  "Patient is hearing voices telling him to do things such as kill himself. He does not feel safe outside of the hospital. Patient requests placement and would like inpatient psychiatric treatment to restart his medications.     During visit with this provider patient presented as somewhat irritable, but mostly cooperative. He reported abovementioned Auditory hallucinations, said that at times they tell him that he would never make it/get out, at times they tell him that they would cut his fingers or tell him to harm self, but not others. Said that he was hearing voices right at the moment of our visit, though, subjectively, he didn't appear to be exhibiting Auditory hallucinations. He reported being off his medications couple of months which seemed to coincide with time when he left [facility] CD treatment program in [place], [place]. He tested positively for Amphetamines and admitted during our visit that using meth makes voices worse. He at the same time told me repeatedly that he would like to be \"on my Adderall for ADHD, I never abuse it\". He reported feeling safe at this hospital, showed some interest in going to CD treatment, then, got very upset when I explained that meth and Adderall are, essentially, have the same chemical structure and most of doctors would not prescribe Controlled substance such as Adderall to someone who has addiction to meth. Chu said that it was wrong and he would like to find a doctor who would prescribe him Adderall here. When asked if he wanted to be discharged or stay at this hospital to receive care for his anxiety, Auditory hallucinations and depression, Chu said that he would stay.      Past psychiatric history: Previous diagnoses include MDD, CULLEN, and Meth Abuse. He has had inpatient hospitalization. Patient has had QUINCY treatment 15 times with the most recent at [facility]. Patient admitted that he does not take QUINCY treatment seriously. Has a history of frequent visits to " ED - some of visits were clearly triggered by meth use. Also uses alcohol and THC. He per chart review also has a history of providing incorrect name and  because he was paranoid about others using his records against him. Has a history of being treated with Risperdal, Zyprexa, Celexa, Seroquel and possibly, others.     Initial contact w/ patient/parent: Once patient has next level of care appointment scheduled      Additional Scheduling Instructions for Transition Clinic Coordinator:     TC Coordinators:  This is a Medication Management only Referral.        TC Coordinator, please inform this (patient/ parent/guardian/facility staff member) as to the purpose and benefit of the TC.      The Transition Clinic is a Temporary Service that helps to bridge the time to your next appointment.  It is not intended to be a long-term service and you are expected to attend your scheduled appointment with your next provider.      Patient/Parent/ Facility Staff Member verbalized understanding     If you need support between appointments, please call [phone] and let them know you're seen by Transition Clinic Psychiatry.  You may also send a [employer] message to reach us.          RN Signature Karl Garcia RN on 10/10/2023 at 10:57 AM        FW: Referral  Received: Today  Alyx Zacarias Transition Clinic Rn Pool  See below.    Alyx Zacarias  Transition Clinic Coordinator  10/10/23 10:45 AM          Previous Messages       ----- Message -----  From: Alfredo Olivas MD  Sent: 10/10/2023  10:39 AM CDT  To: AKBAR Curtis CNP; Transition Clinic; *  Subject: Referral                                        Transition Clinic Referral  [place]        Please Check Type of Referral Requested:      ____THERAPY: The Transition clinic is able to schedule patients without current medical insurance; these patient will be referred to our Social Work Care Coordinator for Medical Insurance              Assistance. We are open for referral for psychotherapy. Patient is referred from:        ___X_MEDICATION:  Referrals for Medication are ONLY accepted from the following areas (select): Other..... STANDARD PRIORITY                                      Suboxone and Opioid Management Referrals are automatically denied. TC Psychiatry cannot see patient without active medical insurance.     Next level of psychiatry care must be within 12 months.  TC Psychiatry cannot accept patient with next level of care scheduled with PCP.  The transition clinic cannot follow patients who are on a restricted recipient program.    GUARDIAN: If your patient is not their own Guardian, please provide the following:    Guardian Name:  Guardian Contact Information (Phone & Email) :  Guardian Address:    FOSTER CARE PROVIDER: If your patient lives at a Licensed Foster Care, please provide the following:    Foster Provider:  Foster Provider Contact Information (Phone & Email):  Foster Provider Address:      Referring Provider Contact Name: Alfredo Olivas MD; Phone Number: [phone]      Reason for Transition Clinic Referral: Patient was discharged from psychiatric hospitalization 8/30/2023-needing stabilization    Next Level of Care Patient Will Be Transitioned To: Pending-writer is creating a referral to get community psychiatry appointment -kindly Request TC assistance in establishing next level of care  Provider(s) pending  Location pending  Date/Time pending  Once referral is completed and next level of care is obtained please schedule with TC Psychiatry/Rosana    What Would Be Helpful from the Transition Clinic: bridging med eval     Needs: NO    Does Patient Have Access to Technology: Unknown    Patient E-mail Address: [email or web address]    Current Patient Phone Number: [phone];    Clinician Gender Preference (if applicable): NO    Patient location preference: likely in person    Alfredo Olivas MD

## 2023-10-11 NOTE — TELEPHONE ENCOUNTER
Second attempt to contact pt. Writer left a VM with TC contact info and encouraged a phone call back to schedule initial therapy appointment. Coordinator will corina referral as complete.Tracker completed.    Alison Virk  10/11/2023  603

## 2023-10-19 ENCOUNTER — TELEPHONE (OUTPATIENT)
Dept: BEHAVIORAL HEALTH | Facility: CLINIC | Age: 38
End: 2023-10-19
Payer: MEDICAID

## 2023-10-19 NOTE — TELEPHONE ENCOUNTER
Pt called back to TC to follow up on VM received. Coordinator scheduled med management appointment for patient using Care Connect/SchedulR. Patient asked that info be emailed to him. Coordinator explained needs to complete intake docs and call clinic to confirm and noted will include those instructions in the email.    Date: Monday, 10/23/2023  Time: 1:00 pm - 2:00 pm  Provider: America DIAZ PA-C  Location: [facility], [address] [place] [address], [address], [place] [address]  Phone: [phone]  Type: Telepsychiatry  Patient Instructions: Please fill New Patient Form by using following link. All forms need to be completed 24 hours prior to the appointment date/time by going to [email or web address] Please call us on [phone] 24 hours prior to your scheduled appointment to confirm that you are able to attend. We will provide you information about how to log into video call software when you call.    Routing to TC RN and Dr. Olivas as ALLYSONI.    Alyx Zacarias  Transition Clinic Coordinator  10/19/23 2:28 PM

## 2023-10-23 ENCOUNTER — HOSPITAL ENCOUNTER (EMERGENCY)
Facility: CLINIC | Age: 38
Discharge: HOME | End: 2023-10-24
Attending: FAMILY MEDICINE | Admitting: FAMILY MEDICINE
Payer: MEDICAID

## 2023-10-23 VITALS
TEMPERATURE: 98.4 F | SYSTOLIC BLOOD PRESSURE: 126 MMHG | HEART RATE: 99 BPM | RESPIRATION RATE: 19 BRPM | DIASTOLIC BLOOD PRESSURE: 80 MMHG

## 2023-10-23 DIAGNOSIS — Z76.5 MALINGERING: ICD-10-CM

## 2023-10-23 DIAGNOSIS — F19.10 POLYSUBSTANCE ABUSE (H): ICD-10-CM

## 2023-10-23 DIAGNOSIS — F41.8 DEPRESSION WITH ANXIETY: ICD-10-CM

## 2023-10-23 LAB — ALCOHOL BREATH TEST: 0.01 (ref 0–0.01)

## 2023-10-23 PROCEDURE — 99283 EMERGENCY DEPT VISIT LOW MDM: CPT | Performed by: FAMILY MEDICINE

## 2023-10-23 PROCEDURE — 82075 ASSAY OF BREATH ETHANOL: CPT | Performed by: FAMILY MEDICINE

## 2023-10-24 ASSESSMENT — ACTIVITIES OF DAILY LIVING (ADL): ADLS_ACUITY_SCORE: 35

## 2023-10-24 NOTE — DISCHARGE INSTRUCTIONS
"Discharge from the emergency room with plans to go to reentry house at [address].    Attend Re-entry Crisis  [phone] at [address] in the morning and [facility] for Rule 25  [phone] at [address] (takes Walk-ins) , located in the same building, in the morning.    Aftercare Plan  If I am feeling unsafe or I am in a crisis, I will:   Contact my established care providers   Call the National Suicide Prevention Lifeline: 988  Go to the nearest emergency room   Call 911     Warning signs that I or other people might notice when a crisis is developing for me: \"Increased drug use, increased mental health symptoms, paranoia\"    Things I am able to do on my own to cope or help me feel better: \"I want treatment\"     Things that I am able to do with others to cope or help me better: \"Contact \"     Things I can use or do for distraction: \"Sleep, Work on getting into treatment, and work on getting housing.\"    Changes I can make to support my mental health and wellness: Work with facility providers for outpatient providers.     People in my life that I can ask for help: Crisis team     Your [facility] has a mental health crisis team you can call 24/7: [facility] Mobile Crisis  [phone]     Other things that are important when I'm in crisis: Get sleep, eat meals, drink water/fluids, abstain from substances.     Additional resources and information:     Substance Use Disorder Direct Access Resources    It is recommended that you abstain from all mood altering chemicals. Please contact the sober support hotline (053-419-2711) as needed; phones are answered 24 hours a day, 7 days a week.    To access substance use treatment you must have a comprehensive assessment completed to begin any treatment program.     If uninsured, please contact your county of residence for eligibility screen to substance use disorder evaluation and treatment:    [place] - [phone]   [place]  [phone]   [place] - " [phone]   [place]  [phone]   [facility] [phone]   [place]  [phone]   [facility] [phone]   [place] - [phone]     If you have private insurance, call the customer service number on the back of your insurance card to find an in-network substance abuse use disorder assessment. The ideal provider will be a treatment facility, licensed in the state of [place].     Community QUINCY Evaluations: Clients may call their county for a full list of providers - Availability and services listed belo are subject to change, please call the provider to confirm    [facility]  [phone]  [address] [place] [address], [address]  *Please call the above number to schedule a comprehensive assessment for determination of level of care needs. In person and virtual appointments available Mon-Fri.    [facility], [address] [place] [address], First Floor, [address], [place] [address] (next to the outpatient lab)    Phone: [phone]   Provides bridging services to people with Opiate Use Disorders (OUD) seeking care. This is a front door to Medication Assisted Treatments (MAT), ages 16+  Walk In hours: Monday-Friday 9:00am-3:00pm    [facility]  [phone]  Walk in Assessments: Mon-Friday 7a-1:45p  [address], [place], [address]    [facility]  Central Access [phone]  [address] [place] [address], [address]  *by appointment only    [facility]  [phone] (phone consultation available )  Locations in: [place], [place], [facility], and [place], [place]  [place] Islander virtual IOP [phone] or visit [email or web address]   Also offers LGBTQ programming     [facility]  [phone]  [address] [facility]  [place], [place], [address]  *Currently only offered via telehealth - call to set up an appointment    [facility]  [address] [place] [address],  [phone]  [facility] Recovery Program  For more information to to make a referral call:  [phone]  Walk-in on Fridays  9-11 a.m.    [facility]  [phone]  [address] [place], [place], [address]  *available by appointments only    Susu coleman  [phone]  [address] [place], [place], [address]  *available by appointment only    [facility]  [phone]  [address] [place], [place], [address]  *walk in assessments available M-F starting at 7 am.    [facility]  [phone]  Locations: [facility], [facility], [place], and [place]  *Walk in assessments availble M-F starting at 8 am -virtual only    [facility]  [phone]  [address] [place] [address]    [facility]  Virtual + Locations: [place], [place], [place], [place], [facility], [facility], [place], [place]   [phone]  *available by appointment only    [facility]  [phone]  [address]  [place], [place], [address]    [facility]  [phone]  [address] [place], [address]  *available by appointment    [facility]  [phone]  [address] [place], [place], [address]  *walk ins available M-TH from 9-3    [facility]  MAT program: [phone]  [address] [place], [place], [address]    [place]  [phone]  Same day substance use disorder assessments are available Monday - Friday, via walk-in or by appointment at the [place] location.  [address], [address], [place] [address]     [facility] - adolescent and adult SUDs services  [phone]  Offer services Monday through Friday, as well as evening hours Monday through Thursday. Normally, a first appointment will be scheduled within one week  [email or web address]  Locations all over [place]    If you are intoxicated, you may be  required to detox at a detox facility before starting treatment. The following are detox facilities that you can self present to. All detox facilities are able to help you complete an assessment prior to discharge if you choose:    [facility]: Arrive at a [facility] for immediate medical evaluation    [facility]: [address] [place], [place], [address].         [phone]    [facility]: [address] [place], [place], [address]  [phone]     [facility] [address], [place], [address]  [phone]     [facility]: [address] [place], [place], [address], [phone]         Ways to help cope with sobriety:    -- Take prescribed medicines as scheduled  -- Keep follow-up appointments  -- Talk to others about your concerns  -- Get regular exercise  -- Practice deep breathing skills  -- Eat a healthy diet  -- Use community resources, including hotline numbers, [facility] crisis and support meetings  -- Stay sober and avoid places/people/things associated with substance use  --Maintain a daily schedule/routine  --Get at least 7-8 hours of sleep per night  --Create a list 10--20 healthy activities that you can do that are enjoyable and do not involve substance use  --Create daily goals (approx. 1-4 goals) per day and work to achieve them throughout the day.       Free Resources:    [place] Recovery Connection ([facility])  [facility] connects people seeking recovery to resources that help foster and sustain long-term recovery. Whether you are seeking resources for treatment, transportation, housing, job training, education, health care or other pathways to recovery, [facility] is a great place to start.  Phone: 663.875.7483. www.minnes[place]Miira.[employer] (Great listing of all types of recovery and non-recovery related resources)    Alcoholics Anonymous  Phone: 5-826-ALCOHOL  Website: HTTP://WWW.AA.ORG/  [facility] [place] (362-116-5403 or  "http://aamin[place].org)  [facility] [place] (834-665-2173 or www.aastpaul.org)     Narcotics Anonymous  Phone: 926.100.9279  Website: www.BeautyCon.BrandBeau.    [facility]  [address] [place] [address], [address], [place], [place],  Phone: [phone]  Drop-in Hours: Monday-Friday 9-11:30 am. By appointment at other times.  Provides: [facility] is a drop-in center on the [place] of [place] that provides a safe space for individuals who are homeless and have a history of chemical use. Sobriety is not a requirement but drugs and alcohol are not allowed on the property.  Services: Non-clients can access drop-in services such as Recovery and Harm Reduction Groups, referrals to case management, community activities, shower facilities, and a pool table. Individuals who are homeless and have chemical health needs may be eligible for enrollment into [facility]'s case management program. Clients and  work together to access benefits, treatment, health care, shelter, and external housing resources.    Crisis Residences:    [facility] (213) 347-8726    Crisis Lines  Crisis Text Line  Text 005010  You will be connected with a trained live crisis counselor to provide support.    Por espanol, texto  BHUPENDRA a 787060 o texto a 442-AYUDAME en WhatsApp    [facility] (LGBTQ Youth Crisis Line)  5.357.945.0753  text START to 757-342      Community Resources  Fast Tracker  Linking people to mental health and substance use disorder resources  fasttrackermn.org     [place] Mental Health Warm Line  Peer to peer support  Monday thru Saturday, 12 pm to 10 pm  174.503.5999 or 8.038.873.4939  Text \"Support\" to 18925    National Moon on Mental Illness (MONTSERRAT)  028.160.4079 or 1.888.MONTSERRAT.HELPS      Mental Health Apps  My3  https://my3app.org/    VirtualHopeBox  https://NatureBridge.org/apps/virtual-hope-box/      Additional Information  Today you were seen by a licensed " mental health professional through Triage and Transition services, Behavioral Healthcare Providers ([facility])  for a crisis assessment in the Emergency Department at [facility].  It is recommended that you follow up with your established providers (psychiatrist, mental health therapist, and/or primary care doctor - as relevant) as soon as possible. Coordinators from [facility] will be calling you in the next 24-48 hours to ensure that you have the resources you need.  You can also contact [facility] coordinators directly at [phone]. You may have been scheduled for or offered an appointment with a mental health provider. [facility] maintains an extensive network of licensed behavioral health providers to connect patients with the services they need.  We do not charge providers a fee to participate in our referral network.  We match patients with providers based on a patient's specific needs, insurance coverage, and location.  Our first effort will be to refer you to a provider within your care system, and will utilize providers outside your care system as needed.

## 2023-10-24 NOTE — CONSULTS
"Diagnostic Evaluation Consultation  Crisis Assessment    Patient Name: Chu Pimentel  Age:  37 year old  Legal Sex: male  Gender Identity: male  Pronouns:   Race:    Black or   White  Ethnicity: Not  or   Language: English      Patient was assessed: In person      Patient location: [facility] EMERGENCY DEPARTMENT                                 Referral Data and Chief Complaint  Chu Pimentel presents to the ED by  self. Patient is presenting to the ED for the following concerns: Worsening psychosocial stress, Anxiety, Depression, Paranoia, Substance use.   Factors that make the mental health crisis life threatening or complex are:  Patient stated that he was here to get his blood drawn, to get to a treatment center, and to get his records.  He declined his blood draw, twice.  He asked for detox, but he did not meet their criteria, the ED MD stated.  Patient was sleepy and disoriented.  He was irritable, at first, but then he calmed down.  He was not aggressive.  He told the doctor he had SI.  He denied to this  that he had SI.  He denied NSSI and HI.  He said, \"I struggle with alcohol and drugs.  Every time I come here, I get rushed out.  This shit is fake.  I wanna get everyone's information and I'm gonna turn them in.  [phone] recycle that.  None of that was established.  If you do this and everybody looks around me.  Everything is not safe.  These people here.  I can't deal.  This shit is so strupid.  She's the one who took it.  I already read it.  The fact of the matter of anyone that wants to see more or won't see me, I just hope they don't see me.  People, places, and things wanna run in circles.  I don't trust people.\"  Patient has not been sleeping or eating well.  He denied hallucinations.  He was paranoid and delusional..      Informed Consent and Assessment Methods  Explained the crisis assessment process, including applicable " "information disclosures and limits to confidentiality, assessed understanding of the process, and obtained consent to proceed with the assessment.  Assessment methods included conducting a formal interview with patient, review of medical records, collaboration with medical staff, and obtaining relevant collateral information from family and community providers when available.  : done     Patient response to interventions: acceptance expressed  Coping skills were attempted to reduce the crisis:  Sleep     History of the Crisis   Previous diagnoses include MDD, CULLEN, and Meth Abuse. He has had inpatient hospitalizations.  His last one was on station 30,  [facility] , 8/29-8/30/23 and the one before that was 3/10-3/22/2023. He has a history of not cooperating or participating.  Patient has had QU[facility]CY treatment 15 times with the most recent at [facility]. Patient admitted that he does not take QUINCY treatment seriously.  He reports family history of mental health and substance use disorders.  Patient was not sure whether his Rule 25 was active and he didn't remember his worker's name.  He said, \"I'm supposed ot have 10 .  I don't know any of their names.\"    Brief Psychosocial History  Family:  Single, Children yes  Support System:  None  Employment Status:  unemployed  Source of Income:  unable to assess  Financial Environmental Concerns:  unemployed  Current Hobbies:     Barriers in Personal Life:  mental health concerns, transportation concerns, lack of companionship, financial concerns  Patient is homeless.  He states he doesn't have contact with family.    Significant Clinical History  Current Anxiety Symptoms:  anxious  Current Depression/Trauma:  impaired decision making, avoidance, difficulty concentrating, helplessness, irritable  Current Somatic Symptoms:  anxious  Current Psychosis/Thought Disturbance:  flight of ideas, high risk behavior, impulsive, inattentive, displaces blame  Current Eating " Symptoms:     Chemical Use History:  Alcohol: Daily  Benzodiazepines: None  Opiates: Other (comments) (Fentanyl)  Cocaine: None  Marijuana: None  Other Use: Methamphetamines   Past diagnosis:  ADHD, Anxiety Disorder, Depression, Substance Use Disorder, PTSD  Family history:  Other (unspecified)  Past treatment:  Individual therapy, Case management, Primary Care, Psychiatric Medication Management, Residential Treatment, Inpatient Hospitalization, Supportive Living Environment (group home, care home house, etc), AA/NA  Details of most recent treatment:  [facility] treatment at [facility].  Other relevant history:          Collateral Information  Is there collateral information: No (No answer at father's)     Risk Assessment  [place] Suicide Severity Rating Scale Full Clinical Version:  Suicidal Ideation  Q1 Wish to be Dead (Lifetime): Yes  Q2 Non-Specific Active Suicidal Thoughts (Lifetime): No  3. Active Suicidal Ideation with any Methods (Not Plan) Without Intent to Act (Lifetime): No  Q4 Active Suicidal Ideation with Some Intent to Act, Without Specific Plan (Lifetime): No  Q5 Active Suicidal Ideation with Specific Plan and Intent (Lifetime): No  Q6 Suicide Behavior (Lifetime): yes     Suicidal Behavior (Lifetime)  Actual Attempt (Lifetime): Yes  Has subject engaged in non-suicidal self-injurious behavior? (Lifetime): No  Interrupted Attempts (Lifetime): No  Aborted or Self-Interrupted Attempt (Lifetime): No  Preparatory Acts or Behavior (Lifetime): No    [place] Suicide Severity Rating Scale Recent:   Suicidal Ideation (Recent)  Q1 Wished to be Dead (Past Month): yes  Q2 Suicidal Thoughts (Past Month): yes  Q3 Suicidal Thought Method: no  Q4 Suicidal Intent without Specific Plan: no  Q5 Suicide Intent with Specific Plan: no  Level of Risk per Screen: low risk     Suicidal Behavior (Recent)  Actual Attempt (Past 3 Months): No  Has subject engaged in non-suicidal self-injurious behavior? (Past 3 Months):  "No  Interrupted Attempts (Past 3 Months): No  Aborted or Self-Interrupted Attempt (Past 3 Months): No  Preparatory Acts or Behavior (Past 3 Months): No    Environmental or Psychosocial Events: ongoing abuse of substances, other life stressors, unemployment/underemployment, unstable housing, homelessness  Protective Factors: Protective Factors: help seeking    Does the patient have thoughts of harming others? Feels Like Hurting Others: no  Previous Attempt to Hurt Others: no  Current presentation: Confused  Violence Threats in Past 6 Months: no  Current Violence Plan or Thoughts: no  Is the patient engaging in sexually inappropriate behavior?: no  Duty to warn initiated: no    Is the patient engaging in sexually inappropriate behavior?  no        Mental Status Exam   Affect: Flat  Appearance: Appropriate  Attention Span/Concentration: Attentive, Inattentive  Eye Contact: Avoidant (\"sorry, the lights are too bright,\" patient said.)    Fund of Knowledge: Appropriate   Language /Speech Content: Fluent  Language /Speech Volume: Normal  Language /Speech Rate/Productions: Normal  Recent Memory: Variable  Remote Memory: Variable  Mood: Irritable  Orientation to Person: No   Orientation to Place: No  Orientation to Time of Day: No  Orientation to Date: No     Situation (Do they understand why they are here?): No  Psychomotor Behavior: Underactive  Thought Content: Paranoia  Thought Form: Tangential, Loose Associations     Medication  Psychotropic medications:   Medication Orders - Psychiatric (From admission, onward)      None             Current Care Team  Patient Care Team:  No Ref-Primary, Physician as PCP - General  No Ref-Primary, Physician  Shy Kamara MD as Assigned PCP    Diagnosis  Patient Active Problem List   Diagnosis Code    Essential hypertension I10    Anxiety F41.9    Recurrent major depressive disorder (H24) F33.9    Migraine without aura G43.009    Tobacco abuse Z72.0    Class 3 severe obesity due " to excess calories with serious comorbidity and body mass index (BMI) of 40.0 to 44.9 in adult (H) E66.01, Z68.41    Insomnia due to other mental disorder F51.05, F99    Attention deficit hyperactivity disorder (ADHD), predominantly inattentive type F90.0    CULLEN (generalized anxiety disorder) F41.1    Moderate episode of recurrent major depressive disorder (H) F33.1    Methamphetamine abuse (H) F15.10    Paranoid ideation (H) F22    Trench foot, unspecified laterality, initial encounter T69.029A    At risk for unsafe behavior Z91.89    Infection due to 2019 novel coronavirus U07.1    Psychosis (H) F29    Drug abuse (H) F19.10    Severe episode of recurrent major depressive disorder, with psychotic features (H) F33.3       Primary Problem This Admission  Active Hospital Problems    *Severe episode of recurrent major depressive disorder, with psychotic features (H)      CULLEN (generalized anxiety disorder)      Clinical Summary and Substantiation of Recommendations   After therapeutic assessment, intervention and aftercare planning by ED care team and LMHP and in consultation with attending provider, the patient's circumstances and mental state were appropriate for outpatient management. It is the recommendation of this clinician that pt discharge with OP MH support. A this time the pt is not presenting as an acute risk to self or others due to the following factors: patient denied SI, NSSI, and HI to this .  He wanted detox, but he did not meet criteria.  He stated he wanted to go to [facility] and then get his Rule 25 updated next door to that place, at [facility]; after it was discussed.  [facility] was called and they stated they will have a bed open, in the morning.  He declined further scheduling.    Patient coping skills attempted to reduce the crisis:  Sleep    Disposition  Recommended disposition: Rule 25/QUINCY Assessment, Substance Abuse Disorder Treatment        Reviewed case and recommendations  with attending provider. Attending Name: Joni Khoury MD       Attending concurs with disposition: yes       Patient and/or validated legal guardian concurs with disposition:   yes       Final disposition:  discharge    Legal status on admission:      Assessment Details   Total duration spent with the patient: 40 min     CPT code(s) utilized: 05747 - Psychotherapy for Crisis - 60 (30-74*) min    Magdalena Busch [facility], Psychotherapist  DEC - Triage & Transition Services  Callback: [phone]

## 2023-10-24 NOTE — ED TRIAGE NOTES
Triage Assessment (Adult)       Row Name 10/23/23 0019          Triage Assessment    Airway WDL WDL        Respiratory WDL    Respiratory WDL WDL        Skin Circulation/Temperature WDL    Skin Circulation/Temperature WDL WDL        Cardiac WDL    Cardiac WDL WDL        Peripheral/Neurovascular WDL    Peripheral Neurovascular WDL WDL        Cognitive/Neuro/Behavioral WDL    Cognitive/Neuro/Behavioral WDL WDL

## 2023-10-24 NOTE — ED PROVIDER NOTES
ED Provider Note  [facility]      History     Chief Complaint   Patient presents with    Addiction Problem     Pt wanting detox for alcohol and fentanyl. Pt denies seizure/DT. Last etoh and fentanyl use was today.      HPI  Chu Pimentel is a 37 year old male with a past medical history of hypertension, anxiety, depression, and polysubstance abuse who presents to the emergency department seeking detox from methamphetamine and fentanyl. He reports his last use for methamphetamine alcohol and and fentanyl was yesterday. He denies any history of withdrawal seizures or DTs.  Patient does note that he has some underlying suicidal thoughts.  When discussing possible work-up for evaluation for detox patient is unwilling to do any blood tests at this time.  Patient relatively uncooperative but is willing to see an .    Past Medical History  Past Medical History:   Diagnosis Date    Anxiety     Depression     Drug abuse (H)     HTN (hypertension)     Hypertension     Obesity      Past Surgical History:   Procedure Laterality Date    CHOLECYSTECTOMY  2012     amphetamine-dextroamphetamine (ADDERALL) 20 MG tablet  escitalopram (LEXAPRO) 10 MG tablet  traZODone (DESYREL) 50 MG tablet      Allergies   Allergen Reactions    Fish-Derived Products     Penicillins Hives     Family History  Family History   Problem Relation Age of Onset    Hypertension Mother     Hypertension Father      Social History   Social History     Tobacco Use    Smoking status: Every Day     Packs/day: 1.5     Types: Cigarettes    Smokeless tobacco: Never   Substance Use Topics    Alcohol use: Yes     Comment: a few times a month    Drug use: Yes     Frequency: 3.0 times per week     Types: Marijuana, Methamphetamines     Comment: last use today         A medically appropriate review of systems was performed with pertinent positives and negatives noted in the HPI, and all other systems negative.    Physical Exam   BP:  126/80  Pulse: 99  Temp: 98.4  F (36.9  C)  Resp: 19  Physical Exam  Constitutional:       General: He is not in acute distress.     Appearance: Normal appearance. He is not toxic-appearing.   HENT:      Head: Atraumatic.   Eyes:      General: No scleral icterus.     Conjunctiva/sclera: Conjunctivae normal.   Cardiovascular:      Rate and Rhythm: Normal rate.      Heart sounds: Normal heart sounds.   Pulmonary:      Effort: Pulmonary effort is normal. No respiratory distress.      Breath sounds: Normal breath sounds.   Abdominal:      Palpations: Abdomen is soft.      Tenderness: There is no abdominal tenderness.   Musculoskeletal:         General: No deformity.      Cervical back: Neck supple.   Skin:     General: Skin is warm.   Neurological:      General: No focal deficit present.      Mental Status: He is alert and oriented to person, place, and time.      Sensory: No sensory deficit.      Motor: No weakness.      Coordination: Coordination normal.   Psychiatric:         Mood and Affect: Affect is labile.         Behavior: Behavior is agitated.         Thought Content: Thought content includes homicidal and suicidal ideation.           ED Course, Procedures, & Data      Procedures          Results for orders placed or performed during the hospital encounter of 10/23/23   Alcohol breath test POCT     Status: Abnormal   Result Value Ref Range    Alcohol Breath Test 0.014 (A) 0.00 - 0.01     Medications - No data to display  Labs Ordered and Resulted from Time of ED Arrival to Time of ED Departure   ALCOHOL BREATH TEST POCT - Abnormal       Result Value    Alcohol Breath Test 0.014 (*)      No orders to display          Critical care was not performed.     Medical Decision Making  The patient's presentation was of moderate complexity (a chronic illness mild to moderate exacerbation, progression, or side effect of treatment).    The patient's evaluation involved:  ordering and/or review of 2 test(s) in this  encounter (see separate area of note for details)  discussion of management or test interpretation with another health professional (independent provider performed DEC assessment at this time we discussed outpatient versus inpatient management.)    The patient's management necessitated high risk (a decision regarding hospitalization).    Assessment & Plan        I have reviewed the nursing notes. I have reviewed the findings, diagnosis, plan and need for follow up with the patient.        Final diagnoses:   Malingering   Polysubstance abuse (H)   Depression with anxiety   I, Thony Frank, am serving as a trained medical scribe to document services personally performed by Joni Khoury MD, based on the provider's statements to me.     I, Joni Khoury MD, was physically present and have reviewed and verified the accuracy of this note documented by Thony Frank.      Joni Khoury MD  formerly [facility] EMERGENCY DEPARTMENT  10/23/2023     Joni Khoury MD  10/25/23 1230

## 2023-10-24 NOTE — ED NOTES
MD spoke with pt about blood draw. MD stated to this RN that pt agreeable to blood draw. RN approached pt, pt again declining blood draw.

## 2023-10-24 NOTE — ED NOTES
Rn went to discharge pt. Pt refusing vital signs, declining to take discharge paperwork and instructions. Pt A&Ox4, ambulatory with steady gait. No signs of acute distress

## 2023-10-25 ENCOUNTER — HOSPITAL ENCOUNTER (EMERGENCY)
Facility: HOSPITAL | Age: 38
Discharge: HOME | End: 2023-10-25
Attending: EMERGENCY MEDICINE | Admitting: EMERGENCY MEDICINE
Payer: MEDICAID

## 2023-10-25 VITALS
RESPIRATION RATE: 18 BRPM | BODY MASS INDEX: 35.21 KG/M2 | DIASTOLIC BLOOD PRESSURE: 61 MMHG | SYSTOLIC BLOOD PRESSURE: 129 MMHG | WEIGHT: 260 LBS | OXYGEN SATURATION: 99 % | HEART RATE: 78 BPM | HEIGHT: 72 IN | TEMPERATURE: 98 F

## 2023-10-25 DIAGNOSIS — F41.9 ANXIETY: ICD-10-CM

## 2023-10-25 DIAGNOSIS — F15.10 METHAMPHETAMINE ABUSE (H): ICD-10-CM

## 2023-10-25 LAB
FLUAV RNA SPEC QL NAA+PROBE: NEGATIVE
FLUBV RNA RESP QL NAA+PROBE: NEGATIVE
RSV RNA SPEC NAA+PROBE: NEGATIVE
SARS-COV-2 RNA RESP QL NAA+PROBE: NEGATIVE

## 2023-10-25 PROCEDURE — 99283 EMERGENCY DEPT VISIT LOW MDM: CPT

## 2023-10-25 PROCEDURE — 87637 SARSCOV2&INF A&B&RSV AMP PRB: CPT | Performed by: EMERGENCY MEDICINE

## 2023-10-25 RX ORDER — TRAZODONE HYDROCHLORIDE 50 MG/1
50 TABLET, FILM COATED ORAL AT BEDTIME
COMMUNITY
Start: 2023-10-17

## 2023-10-25 RX ORDER — ESCITALOPRAM OXALATE 10 MG/1
15 TABLET ORAL DAILY
COMMUNITY

## 2023-10-25 RX ORDER — DEXTROAMPHETAMINE SACCHARATE, AMPHETAMINE ASPARTATE, DEXTROAMPHETAMINE SULFATE AND AMPHETAMINE SULFATE 5; 5; 5; 5 MG/1; MG/1; MG/1; MG/1
20 TABLET ORAL DAILY
COMMUNITY
Start: 2023-10-18

## 2023-10-25 ASSESSMENT — ENCOUNTER SYMPTOMS
VOMITING: 0
CHILLS: 1
NERVOUS/ANXIOUS: 1
FEVER: 0
ABDOMINAL PAIN: 0
DIARRHEA: 0
COUGH: 0
HALLUCINATIONS: 1
DYSPHORIC MOOD: 1
SHORTNESS OF BREATH: 0

## 2023-10-25 ASSESSMENT — ACTIVITIES OF DAILY LIVING (ADL)
ADLS_ACUITY_SCORE: 35
ADLS_ACUITY_SCORE: 33

## 2023-10-25 NOTE — PHARMACY-ADMISSION MEDICATION HISTORY
Pharmacist Admission Medication History    Admission medication history is complete. The information provided in this note is only as accurate as the sources available at the time of the update.    Information Source(s): Patient and CareEverywhere/SureScripts via in-person    Pertinent Information: The patient reports that he doesn't have any hydroxyzine 25mg for anxiety, but would like some. He doesn't take a medication for BP, so lisinopril was removed (last filled in June for 30 days). He stated that he doesn't take olanzapine, so this was removed from his list.    Changes made to PTA medication list:  Added: Adderall,  trazodone, escitalopram  Deleted: citalopram, lisinopril, olanzapine  Changed: None    Medication Affordability:No       Allergies reviewed with patient and updates made in EHR: yes    Medication History Completed By: Tatianna Gaytan RPH 10/25/2023 8:17 AM    PTA Med List   Medication Sig Note Last Dose    amphetamine-dextroamphetamine (ADDERALL) 20 MG tablet Take 20 mg by mouth daily  10/20/2023    escitalopram (LEXAPRO) 10 MG tablet Take 15 mg by mouth daily 10/25/2023: Patient couldn't recall if he was taking citalopram or escitalopram but states that he takes 1&1/2 tablets daily. This is consistent with his most recent fill which was escitalopram  Past Week    traZODone (DESYREL) 50 MG tablet Take 50 mg by mouth at bedtime 10/25/2023: Patient states that he doesn't have any of these. Prescribed 10/17 for 30 days Unknown

## 2023-10-25 NOTE — ED TRIAGE NOTES
Patient arrives from treatment facility via [facility].   States he is extremely anxious and hearing voices in his head telling him to harm himself. Denies suicidal ideation at this time but feels as though the voices are going to cause him to harm himself.   States this has happened in the past and he came to this hospital and we helped him find a psychiatric bed.     Hx methamphetamine. Denies any drugs/alcohol tonight.

## 2023-10-25 NOTE — ED PROVIDER NOTES
EMERGENCY DEPARTMENT ENCOUNTER      NAME: Chu Pimentel  AGE: 37 year old male  YOB: 1985  MRN: [id number]  EVALUATION DATE & TIME: 10/25/2023  6:28 AM    PCP: No Ref-Primary, Physician    ED PROVIDER: Shy Spaulding M.D.        Chief Complaint   Patient presents with    Psychiatric Evaluation    Anxiety         FINAL IMPRESSION:    1. Anxiety    2. Methamphetamine abuse (H)            MEDICAL DECISION MAKING:    Chu Pimentel is a pleasant 37 year old male with history of anxiety, depression, ADHD, methamphetamine abuse, hypertension, migraines, paranoid ideation, who presents to the ER with complaints of paranoid thoughts.  Patient medically stable here in the ER.  He was evaluated by DEC  who does not feel he requires inpatient mental health stabilization and I agree.  He is not acutely suicidal.  He denies want to hurt himself or others.  He does having some reported thoughts that other people are going to hurt him.  Does not appear acutely psychotic.  Does admit to some methamphetamine use with last use yesterday.  He recently left his treatment facility and he is encouraged to try to get back into a treatment facility.  At this time I feel the patient is appropriate for discharge.          ED COURSE:  7:09 AM  I met with the patient to gather history and perform my exam. ED course and treatment discussed.    8:33 AM  Spoke with Harriet IVERSON.  She does not feel the patient is a danger to himself or others and I agree.  She will put resources in for outpatient management.  Encouraged him to contact his treatment center to see if he can get back in there but this is something that he will need to do.  He does not appear acutely psychotic.  He denies any concerns for harm to himself or others.  He denies any suicidal thoughts to me.  Medically speaking he looks quite healthy.  COVID test is per patient request though clinically does not seem consistent with COVID.  He does  not appear toxic or septic.  Does not seem encephalopathic or psychotic.    10:06 AM  Updated patient on my discussion with social work and COVID test is negative.  Plan at this time is discharge home.  He will be able to contact the treatment center himself to see if he qualifies to be readmitted there.  Social work provided him with homeless shelter information as well.  Patient does not meet criteria for inpatient detox at this time.  No signs for active withdrawal at this time.  He has been provided with a meal here.  He is otherwise stable for discharge.  No indication for admission to the hospital for medical reasons or for inpatient mental health stabilization.    At the conclusion of the encounter I discussed the results of all of the tests and the disposition. Their questions were answered. The patient (and any family present) acknowledged understanding and were agreeable with the care plan.    CONSULTANTS:  KISHOR Aguayo        MEDICATIONS GIVEN IN THE EMERGENCY:  Medications - No data to display        NEW PRESCRIPTIONS STARTED AT TODAY'S ER VISIT     Medication List      There are no discharge medications for this visit.             CONDITION:  stable        DISPOSITION:  Discharge         =================================================================  =================================================================  TRIAGE ASSESSMENT:  Patient arrives from treatment facility via [facility].   States he is extremely anxious and hearing voices in his head telling him to harm himself. Denies suicidal ideation at this time but feels as though the voices are going to cause him to harm himself.   States this has happened in the past and he came to this hospital and we helped him find a psychiatric bed.     Hx methamphetamine. Denies any drugs/alcohol tonight.        ED Triage Vitals [10/25/23 0624]   Enc Vitals Group      BP (!) 140/83      Pulse 74      Resp 16      Temp 98  F (36.7  C)      Temp src  "Temporal      SpO2 98 %      Weight 117.9 kg (260 lb)      Height 1.829 m (6')       ================================================================  ================================================================    HPI    Patient information was obtained from: patient    Use of Intrepreter: N/A     Chu Pimentel is a pleasant 37 year old male with history of anxiety, depression, ADHD, methamphetamine abuse, hypertension, migraines, paranoid ideation, who presents to the ER with complaints of paranoid thoughts.    He had been living in a treatment center and left on Friday because he was feeling very paranoid and \"relapsed\".  He states he last used methamphetamine 24 hours ago.  He states that he is hearing voices telling him that someone is going to harm him.  He denies any suicidal thoughts or thoughts to harm himself, but states these thoughts are telling him that someone else is going to harm him.    He states that a few days ago he did feel some chills but otherwise has been fine since.  He is requesting a COVID test.  He has not tried to go back to the treatment center to see if they would take him back.  He states that over the weekend he lost his prescription for citalopram and also lost the SIM card from his phone and therefore his phone is not working.    He states that he feels like our hospital has been helpful in the past and therefore presents to our hospital open for consideration for inpatient mental health placement.    Otherwise denies any fevers, chest pain, cough, abdominal pain, vomiting or diarrhea.    Pt arrived by EMS today.      REVIEW OF SYSTEMS  Review of Systems   Constitutional:  Positive for chills (few days ago). Negative for fever.   Respiratory:  Negative for cough and shortness of breath.    Cardiovascular:  Negative for chest pain.   Gastrointestinal:  Negative for abdominal pain, diarrhea and vomiting.   Psychiatric/Behavioral:  Positive for dysphoric mood and hallucinations " (hearing voices). Negative for suicidal ideas. The patient is nervous/anxious.    All other systems reviewed and are negative.        PAST MEDICAL HISTORY:  Past Medical History:   Diagnosis Date    Anxiety     Depression     Drug abuse (H)     HTN (hypertension)     Hypertension     Obesity          PAST SURGICAL HISTORY:  Past Surgical History:   Procedure Laterality Date    CHOLECYSTECTOMY  2012         CURRENT MEDICATIONS:    Prior to Admission medications    Medication Sig Start Date End Date Taking? Authorizing Provider   citalopram (CELEXA) 40 MG tablet Take 1 tablet (40 mg) by mouth daily 8/31/23   Leonel Anderson MD   hydrOXYzine (ATARAX) 25 MG tablet Take 1 tablet (25 mg) by mouth every 4 hours as needed for anxiety 8/30/23   Leonel Anderson MD   lisinopril (ZESTRIL) 20 MG tablet Take 1 tablet (20 mg) by mouth daily 8/31/23   Leonel Anderson MD   OLANZapine (ZYPREXA) 10 MG tablet Take 1 tablet (10 mg) by mouth 3 times daily as needed (hallucinations) 8/30/23   Leonel Anderson MD   OLANZapine (ZYPREXA) 5 MG tablet Take 1 tablet (5 mg) by mouth 2 times daily 8/30/23   Leonel Anderson MD         ALLERGIES:  Allergies   Allergen Reactions    Fish-Derived Products     Penicillins Hives         FAMILY HISTORY:  Family History   Problem Relation Age of Onset    Hypertension Mother     Hypertension Father          SOCIAL HISTORY:  Social History     Socioeconomic History    Marital status: Single   Tobacco Use    Smoking status: Every Day     Packs/day: 1.5     Types: Cigarettes    Smokeless tobacco: Never   Substance and Sexual Activity    Alcohol use: Yes     Comment: a few times a month    Drug use: Yes     Frequency: 3.0 times per week     Types: Marijuana, Methamphetamines     Comment: last use today   Social History Narrative    ** Merged History Encounter **         7/15/2020          VITALS:  Patient Vitals for the past 24 hrs:   BP Temp Temp src Pulse Resp SpO2 Height  Weight   10/25/23 1000 -- -- -- -- 18 -- -- --   10/25/23 0843 129/61 -- -- 78 18 99 % -- --   10/25/23 0801 -- -- -- -- 16 -- -- --   10/25/23 0700 136/66 -- -- -- -- -- -- --   10/25/23 0624 (!) 140/83 98  F (36.7  C) Temporal 74 16 98 % 1.829 m (6') 117.9 kg (260 lb)       Wt Readings from Last 3 Encounters:   10/25/23 117.9 kg (260 lb)   08/29/23 112.9 kg (248 lb 14.4 oz)   08/28/23 112.1 kg (247 lb 1.6 oz)       Estimated Creatinine Clearance: 134 mL/min (based on SCr of 1.02 mg/dL).    PHYSICAL EXAM    Constitutional:  Well developed, Well nourished, NAD, GCS 15  HENT:  Normocephalic, Atraumatic, Bilateral external ears normal, Nose normal. Neck- Supple, No stridor.   Eyes:  PERRL, EOMI, Conjunctiva normal, No discharge.  Respiratory:  Normal breath sounds, No respiratory distress, No wheezing, Speaks full sentences easily. No cough.   Cardiovascular:  Normal heart rate, Regular rhythm, No murmurs, No rubs, No gallops.   GI:  No excessive obesity.  Bowel sounds normal, Soft, No tenderness, No masses, No flank tenderness. No rebound or guarding.   : deferred  Musculoskeletal: 2+ DP pulses. No edema.  No cyanosis, No clubbing. Good range of motion in all major joints. No major deformities noted.   Integument:  Warm, Dry, No erythema, No rash.  No petechiae.   Neurologic:  Alert & oriented x 3, No focal deficits noted. Normal gait.   Psychiatric:  Affect normal, very pleasant and cooperative, admits to hearing voices that are telling him that someone is going to hurt him.  He denies the voices telling him to hurt himself.  He denies feeling suicidal at this time.       LAB:  All pertinent labs reviewed and interpreted.  Recent Results (from the past 24 hour(s))   Symptomatic Influenza A/B, RSV, & SARS-CoV2 PCR (COVID-19) Nasopharyngeal    Collection Time: 10/25/23  7:47 AM    Specimen: Nasopharyngeal; Swab   Result Value Ref Range    Influenza A PCR Negative Negative    Influenza B PCR Negative Negative    RSV  "PCR Negative Negative    SARS CoV2 PCR Negative Negative       No results found for: \"ABORH\"        RADIOLOGY:  Reviewed all pertinent imaging. Please see official radiology report.    No orders to display         EKG:    none      PROCEDURES:  none      Medical Decision Making    History:  Supplemental history from: Documented in chart, if applicable  External Record(s) reviewed: Documented in chart, if applicable.    Work Up:  Chart documentation includes differential considered and any EKGs or imaging independently interpreted by provider, where specified.  In additional to work up documented, I considered the following work up: Documented in chart, if applicable.    External consultation:  Discussion of management with another provider: Documented in chart, if applicable    Complicating factors:  Care impacted by chronic illness: Mental Health  Care affected by social determinants of health: Access to Medical Care, Access to Affordable Health Care, Alcohol Abuse and/or Recreational Drug Use, and Housing Insecurity    Disposition considerations: Discharge. No recommendations on prescription strength medication(s). I considered admission, but ultimately discharged patient as patient denies feeling suicidal to me and has been evaluated by social work and they agree that he does not appear to be a danger to himself or others.  Does not appear acutely psychotic at this time..    Shy Spaulding M.D. [facility]  Emergency Medicine and Medical Toxicology  [facility] EMERGENCY DEPARTMENT  [facility] [facility] [id number] [phone]  Dept: [phone]           Shy Spaulding MD  10/25/23 1040    "

## 2023-10-25 NOTE — CONSULTS
Diagnostic Evaluation Consultation  Crisis Assessment    Patient Name: Chu Pimentel  Age:  37 year old  Legal Sex: male  Gender Identity: male  Pronouns:   Race:    Black or   White  Ethnicity: Not  or   Language: English      Patient was assessed: Virtual: [facility] Crisis Assessment Start Time: 0800 Crisis Assessment Stop Time: 0832  Patient location: [facility] EMERGENCY DEPARTMENT                             [id number]    Referral Data and Chief Complaint  Chu Pimentel presents to the ED via EMS. Patient is presenting to the ED for the following concerns: Suicidal ideation, Other (see comment) (Auditory Hallucinations).   Factors that make the mental health crisis life threatening or complex are:  Pt states he has suicidal ideations, auditory hallucinations and feels anxious due to his recent use of methamphetamine. Pt doesn't go into detail about his hallucinations with this writer and is wanting to talk about his desire to go to detox. [facility], relapsing on methamphetamines. Pt states he struggles with methamphetamine use, has cold sweats and is anxious. Pt also states he is paranoid, doesn't feel safe (because he feels he is going to use substances if he is discharged), mind is fuzzy. [facility]. Pt doesn't go into details when asked and states he knows what he needs to do and that [facility] and to get back into treatment. Pt states he is on probation and it is required as part of his probation to attend [facility] treatment. Pt states he has been in contact with his , who is aware that he left Friday. It is unclear if pt informed them of his recent use. Pt also states he feels he needs detox because he has cold sweats, is anxious and has fuzzy thinking. Writer discuss how detox would be inappropriate for him at this time. Pt states he has not slept but 10 hours in past  72 hours and needs to just sleep. Pt was offered shelter information, [facility] resources and information on [facility]. Pt states he can make the contacts himself and knows the numbers.    Informed Consent and Assessment Methods  Explained the crisis assessment process, including applicable information disclosures and limits to confidentiality, assessed understanding of the process, and obtained consent to proceed with the assessment.  Assessment methods included conducting a formal interview with patient, review of medical records, collaboration with medical staff, and obtaining relevant collateral information from family and community providers when available. :done     Patient response to interventions: verbalizes understanding  Coping skills were attempted to reduce the crisis:  To sleep, previously took medications and has attended various OP services for support.     History of the Crisis   According to most recent assessment completed yesterday, pt has a hx of MDD, CULLEN, and Meth Abuse. Pt has completed [facility] in the past, most recently station 30, [facility], 8/29-8/30/23 and the one before that was 3/10-3/22/2023. Pt has a history of not cooperating or participating in programming.  Patient has had [facility] treatment 15 times with the most recent at [facility] prior to  [facility]. Pt admitted to previous  that he does not take [facility] treatment seriously.  He reports family history of mental health and substance use disorders.  Pt states he will call [facility] today to see about getting back into treatment and will work with them if they refer him to another program.    Brief Psychosocial History  Family:  Single, Children no (2 children, daughter 10 and son is 9.)  Support System:  Limited to, Parent(s) (Friend Anthony, father and mother)  Employment Status:  unemployed  Source of Income:  public assistance  Financial Environmental Concerns:  unemployed  Current Hobbies:  other (see comments) (Bike rides, basketball,  fish)  Barriers in Personal Life:  financial concerns, lack of motivation    Significant Clinical History  Current Anxiety Symptoms:  anxious, racing thoughts  Current Depression/Trauma:  impaired decision making, avoidance, difficulty concentrating, helplessness, irritable  Current Somatic Symptoms:  sweating, flushing, shaking, anxious  Current Psychosis/Thought Disturbance:  auditory hallucinations (doesn't identify types of hallucination when asked)  Current Eating Symptoms:   (none)  Chemical Use History:  Alcohol: None  Last Use::  (hasn't drank in 2 years)  Benzodiazepines: None  Opiates: None  Cocaine: None  Marijuana: None  Other Use: Methamphetamines  Last Use:: 10/24/23  Withdrawal Symptoms:  (none identified)  Addictions:  (Pt denied)   Past diagnosis:  ADHD, Anxiety Disorder, Depression, Substance Use Disorder, PTSD  Family history:  Other (Pt reports hx of MI and QUINCY in his family, did not go into detail.)  Past treatment:  Individual therapy, Case management, Primary Care, Psychiatric Medication Management, Residential Treatment, Inpatient Hospitalization, Supportive Living Environment (group home, [facility] house, etc), AA/NA, Probation/Court ordered treatment  Details of most recent treatment:  [facility] and MI treatment at [facility], pt reports he left on Friday. Pt states he is going to call to get back into treatment there.  Other relevant history:  Probation for 2 years with [facility] for damage to property.     Collateral Information  Is there collateral information: No - pt would not provide information regarding his friend Anthony. Pt has not talked to his father in some time.     Risk Assessment  [place] Suicide Severity Rating Scale Full Clinical Version:  Suicidal Ideation  Q1 Wish to be Dead (Lifetime): No  Q2 Non-Specific Active Suicidal Thoughts (Lifetime): No  3. Active Suicidal Ideation with any Methods (Not Plan) Without Intent to Act (Lifetime): No  Q4 Active Suicidal Ideation with Some  Intent to Act, Without Specific Plan (Lifetime): No  Q5 Active Suicidal Ideation with Specific Plan and Intent (Lifetime): No  Q6 Suicide Behavior (Lifetime): yes     Suicidal Behavior (Lifetime)  Actual Attempt (Lifetime): Yes  Total Number of Actual Attempts (Lifetime): 1  Has subject engaged in non-suicidal self-injurious behavior? (Lifetime): No  Interrupted Attempts (Lifetime): No  Aborted or Self-Interrupted Attempt (Lifetime): No  Preparatory Acts or Behavior (Lifetime): No    [place] Suicide Severity Rating Scale Recent:   Suicidal Ideation (Recent)  Q1 Wished to be Dead (Past Month): yes  Q2 Suicidal Thoughts (Past Month): yes  Q3 Suicidal Thought Method: no  Q4 Suicidal Intent without Specific Plan: no  Q5 Suicide Intent with Specific Plan: no  Level of Risk per Screen: low risk  Intensity of Ideation (Recent)  Most Severe Ideation Rating (Past 1 Month): 3  Frequency (Past 1 Month): 2-5 times in week  Duration (Past 1 Month): Less than 1 hour/some of the time  Controllability (Past 1 Month): Can control thoughts with a lot of difficulty  Deterrents (Past 1 Month): Deterrents definitely stopped you from attempting suicide  Reasons for Ideation (Past 1 Month): Does not apply  Suicidal Behavior (Recent)  Actual Attempt (Past 3 Months): No  Total Number of Actual Attempts (Past 3 Months): 0  Has subject engaged in non-suicidal self-injurious behavior? (Past 3 Months): No  Interrupted Attempts (Past 3 Months): No  Total Number of Interrupted Attempts (Past 3 Months): 0  Aborted or Self-Interrupted Attempt (Past 3 Months): No  Total Number of Aborted or Self-Interrupted Attempts (Past 3 Months): 0  Preparatory Acts or Behavior (Past 3 Months): No  Total Number of Preparatory Acts (Past 3 Months): 0    Environmental or Psychosocial Events: ongoing abuse of substances, other life stressors, unemployment/underemployment, unstable housing, homelessness  Protective Factors: Protective Factors: help seeking, good  problem-solving, coping, and conflict resolution skills, cultural, spiritual , or Voodoo beliefs associated with meaning and value in life    Does the patient have thoughts of harming others? Feels Like Hurting Others: no  Previous Attempt to Hurt Others: no  Violence Threats in Past 6 Months: no  Current Violence Plan or Thoughts: no  Is the patient engaging in sexually inappropriate behavior?: no  Duty to warn initiated: no    Is the patient engaging in sexually inappropriate behavior?  no        Mental Status Exam   Affect:    Appearance: Appropriate  Attention Span/Concentration: Attentive, Inattentive  Eye Contact: Engaged    Fund of Knowledge: Appropriate   Language /Speech Content: Fluent  Language /Speech Volume: Normal  Language /Speech Rate/Productions: Normal  Recent Memory: Variable  Remote Memory: Variable  Mood: Anxious, Normal  Orientation to Person: Yes   Orientation to Place: Yes  Orientation to Time of Day: Yes  Orientation to Date: Yes     Situation (Do they understand why they are here?): Yes  Psychomotor Behavior: Agitated  Thought Content: Hallucinations  Thought Form: Intact      Medication  Psychotropic medications: Pt was not sure which medications he is taking and their dose during this assessment.     Current Care Team  Patient Care Team:  No Ref-Primary, Physician as PCP - General  No Ref-Primary, Physician  Shy Kamara MD as Assigned PCP    Diagnosis  Patient Active Problem List   Diagnosis Code    Essential hypertension I10    Anxiety F41.9    Recurrent major depressive disorder (H24) F33.9    Migraine without aura G43.009    Tobacco abuse Z72.0    Class 3 severe obesity due to excess calories with serious comorbidity and body mass index (BMI) of 40.0 to 44.9 in adult (H) E66.01, Z68.41    Insomnia due to other mental disorder F51.05, F99    Attention deficit hyperactivity disorder (ADHD), predominantly inattentive type F90.0    CULLEN (generalized anxiety disorder) F41.1     Moderate episode of recurrent major depressive disorder (H) F33.1    Methamphetamine abuse (H) F15.10    Paranoid ideation (H) F22    Trench foot, unspecified laterality, initial encounter T69.029A    At risk for unsafe behavior Z91.89    Infection due to 2019 novel coronavirus U07.1    Psychosis (H) F29    Drug abuse (H) F19.10    Severe episode of recurrent major depressive disorder, with psychotic features (H) F33.3     Primary Problem This Admission  Active Hospital Problems    F15.10 Methamphetamine abuse (H)      F41.9 Anxiety    Clinical Summary and Substantiation of Recommendations   After therapeutic assessment, intervention, and aftercare planning by ED care team and LM and in consultation with attending provider, the patient's circumstances and mental state were appropriate for outpatient management. It is the recommendation of this clinician that pt discharge with OP MH support. At this time the pt is not presenting as an acute risk to self or others due to the following factors: Pt admits to recent methamphetamine use, which may contribute to pt's current symptoms. Pt states he doesn't want to use substances and feels if he is discharged, he could use. Pt was given resources to help aid is sobriety and pt verbalizes that he will call [facility] to get back into treatment today. Pt is encouraged to attend 90 meetings in 90 days to help aid in his sobriety, to acquire a sponsor and reach out to them daily, to establish a daily routine and take his medications as prescribed. Pt also is encouraged to reach out to his PCP for continued medications management, all of which pt is agreeable to. Pt was offered [facility] yesterday during his previous assessment, regarding a bed being available in the morning, he declined and declined any further scheduling.    Patient coping skills attempted to reduce the crisis:  To sleep, previously took medications and has attended various OP services for  support.    Disposition  Recommended disposition: Individual Therapy, Medication Management, Rule 25/QUINCY Assessment, Substance Abuse Disorder Treatment        Reviewed case and recommendations with attending provider. Attending Name: Dr Spaulding       Attending concurs with disposition: yes       Patient and/or validated legal guardian concurs with disposition:   yes       Final disposition:  discharge    Legal status on admission: Voluntary/Patient has signed consent for treatment    Assessment Details   Total duration spent with the patient: 32 min     CPT code(s) utilized: 22536 - Psychotherapy for Crisis - 60 (30-74*) min    Harriet Akins [facility], Psychotherapist  [facility] - Triage & Transition Services  Callback: [phone]

## 2023-10-25 NOTE — DISCHARGE INSTRUCTIONS
Aftercare Plan  If I am feeling unsafe or I am in a crisis, I will:   Contact my established care providers   Call the National Suicide Prevention Lifeline: 988  Go to the nearest emergency room   Call 911     Warning signs that I or other people might notice when a crisis is developing for me:   Triggered to use  Stress  Not sleeping  Not taking medications    Things I am able to do on my own to cope or help me feel better:   Take medications as prescribed, meet with Ramu Singletary for continued medication management  Attend QUINCY/MI treatmentHomero. Call to get back in program. Work on referral if not able to go back.  Attend AA/NA  Secure a sponsor  Avoid using environments  Avoid people who use  Avoid establishments that promote substances     Things that I am able to do with others to cope or help me better:   Attend a recovery support meeting  Go for a walk  Exercise  Play a card game  Talk about stressors     Things I can use or do for distraction:   Go for a walk  Exercise  Take a shower  Watch TV  Listen to music  Apply for jobs in the area     Changes I can make to support my mental health and wellness:   Take medications as prescribed  Attend QUINCY/MI treatment  Continue to meet with  as recommended / required     People in my life that I can ask for help:   Anthony   Mom / Dad  A potential Sponsor  Recovery support group members    Your county has a mental health crisis team you can call 24/7: [facility] Mobile Crisis  [phone]     Substance Use Disorder Direct Access Resources    It is recommended that you abstain from all mood altering chemicals. Please contact the sober support hotline ([phone]) as needed; phones are answered 24 hours a day, 7 days a week.    To access substance use treatment you must have a comprehensive assessment completed to begin any treatment program.     If uninsured, please contact your county of residence for eligibility screen to substance use  disorder evaluation and treatment:    [place] - [phone]   [place] - [phone]   [place] - [phone]   [place] - [phone]   [place] - [phone]   [place] - [phone]   [place] - [phone]   [place] - [phone]     If you have private insurance, call the customer service number on the back of your insurance card to find an in-network substance abuse use disorder assessment. The ideal provider will be a treatment facility, licensed in the [facility].     Community QUINCY Evaluations: Clients may call their county for a full list of providers - Availability and services listed belo are subject to change, please call the provider to confirm    [facility]  [phone]  [address] [place] [address], [address]  *Please call the above number to schedule a comprehensive assessment for determination of level of care needs. In person and virtual appointments available Mon-Fri.    [facility], [address] [place] [address], First Floor, [address], [place] [address] (next to the outpatient lab)    Phone: [phone]   Provides bridging services to people with Opiate Use Disorders (OUD) seeking care. This is a front door to Medication Assisted Treatments (MAT), ages 16+  Walk In hours: Monday-Friday 9:00am-3:00pm    [facility]  [phone]  Walk in Assessments: Mon-Friday 7a-1:45p  [address], [place], [address]    [facility]  Central Access [phone]  [address] [place] [address], [address]  *by appointment only    [place]  [phone] (phone consultation available )  Locations in: [place], [place], [facility], and [place], [place]  Turkish virtual IOP [phone] or visit Vincent.[email or web address]   Also offers LGBTQ programming     [facility]  [phone]  [address] [facility]  [place], [place], [address]  *Currently only offered via telehealth - call to set up an  appointment    [facility]  [address] [place], [place], [address]  Co-Occuring Recovery Program  For more information to to make a referral call:  [phone]  Walk-in on Fridays  9-11 a.m.    [facility]  [phone]  [address] [place], [place], [address]  *available by appointments only    [facility]  [phone]  [address] [place], [place], [address]  *available by appointment only    [facility]  [phone]  [address] [place], [place], [address]  *walk in assessments available M-F starting at 7 am.    [facility]  [phone]  Locations: [facility], [facility], and [place]  *Walk in assessments availble M-F starting at 8 am -virtual only    [facility]  [phone]  [address] [place] [address]    [facility]  Virtual + Locations: [place], [place], [place], [place], [facility], [facility], [place], [place]   [phone]  *available by appointment only    [facility]  [phone]  [address] [place], [address]    [facility]  [phone]  [address] [place], [place], [address]  *available by appointment    [facility]  [phone]  [address] [place], [place], [address]  *walk ins available M-TH from 9-3    [facility]  MAT program: [phone]  [address] [place], [place], [address]    [place]  [phone]  Same day substance use disorder assessments are available Monday - Friday, via walk-in or by appointment at the [place] location.  [address], [place] [address]     [facility] - adolescent and adult SUDs services  [phone]  Offer services Monday through Friday, as well as evening hours Monday through Thursday. Normally, a first appointment will be scheduled within one  week  [email or web address]-services/drug-[email or web address]-treatment  Locations all over [place]    If you are intoxicated, you may be required to detox at a detox facility before starting treatment. The following are detox facilities that you can self present to. All detox facilities are able to help you complete an assessment prior to discharge if you choose:  [facility]: Arrive at a [facility] for immediate medical evaluation    [facility]: [address] [place], [place], [address].         [phone]    [facility]: [address] [place], [place], [address]  [phone]     [facility] ([facility]): [address] [place], [place], [address] [phone]     [facility]: [address] [place], [place], [address], [phone]    Free Resources:  [facility] ([facility])  [facility] connects people seeking recovery to resources that help foster and sustain long-term recovery. Whether you are seeking resources for treatment, transportation, housing, job training, education, health care or other pathways to recovery, [facility] is a great place to start.  Phone: [phone]. www.minnesotaPrometheus Group.M2G (Great listing of all types of recovery and non-recovery related resources)    Alcoholics Anonymous  Phone: 2-112-ALCOHOL  Website: HTTP://WWW.AA.ORG/  AA [place] (969-284-2777 or http://aaminneapolProvidence Surgery.org)  AA [place] (155-723-8484 or www.aastpaul.org)     Narcotics Anonymous  Phone: 714.271.5423  Website: www.[employer].JumpSeller.    [facility]  [address] [place] [address], [address], [place], [place],  Phone: [phone]  Drop-in Hours: Monday-Friday 9-11:30 am. By appointment at other times.  Provides: [facility] is a drop-in center on the east side of [place] that provides a safe space for individuals who are homeless and have a history of chemical use. Sobriety is not a requirement but drugs and alcohol are not allowed on the  "property.  Services: Non-clients can access drop-in services such as Recovery and Harm Reduction Groups, referrals to case management, community activities, shower facilities, and a pool table. Individuals who are homeless and have chemical health needs may be eligible for enrollment into [facility]'s case management program. Clients and  work together to access benefits, treatment, health care, shelter, and external housing resources.    Crisis Lines  Crisis Text Line  Text 068243  You will be connected with a trained live crisis counselor to provide support.    Por espanol, texto  BHUPENDRA a 939044 o texto a 442-AYUDAME en WhatsApp    [facility] (LGBTQ Youth Crisis Line)  5.943.046.1563  text START to 793-229    Community Resources  Fast Tracker  Linking people to mental health and substance use disorder resources  Startups.ImpactMedia     [place] Mental Health Warm Line  Peer to peer support  Monday thru Saturday, 12 pm to 10 pm  755.313.7046 or 5.062.306.4783  Text \"Support\" to 99323    National Arnett on Mental Illness (MONTSERRAT)  906.829.9110 or 1.888.MONTSERRAT.HELPS    Mental Health Apps  My3  https://SyndicatePluspp.org/    VirtualHopeBox  https://InSphero.org/apps/virtual-hope-box/    Additional Information  Today you were seen by a licensed mental health professional through Triage and Transition services, [facility] (P)  for a crisis assessment in the Emergency Department at [facility].  It is recommended that you follow up with your established providers (psychiatrist, mental health therapist, and/or primary care doctor - as relevant) as soon as possible. Coordinators from [facility] will be calling you in the next 24-48 hours to ensure that you have the resources you need.  You can also contact [facility] coordinators directly at [phone]. You may have been scheduled for or offered an appointment with a mental health provider. [facility] maintains an extensive network " of licensed behavioral health providers to connect patients with the services they need.  We do not charge providers a fee to participate in our referral network.  We match patients with providers based on a patient's specific needs, insurance coverage, and location.  Our first effort will be to refer you to a provider within your care system, and will utilize providers outside your care system as needed.      For shelter tonight, start with Adult Shelter Connect Overnight Shelter: [phone]  *Phone lines are closed between 5:30-7:30 pm    [address] (enter on the [address] side near the [facility] corner)  Walk-in Hours: 9 am - 5:30 pm Monday-Friday and 1 pm - 5:30 pm Saturday and Sunday    [facility]  [phone]  [address] [facility]  [phone]  [address] [facility]  [phone]  [address] [facility]  [phone]  [address] [facility]  [phone]  [address] [place] [address]    To start making a plan for a more long-term solution, contact the Coordinated Entry Homeless Assistance Program:  Coordinated Entry Homeless Assistance  [facility]  [address], [place] [address]  [phone]  Open Wednesdays and Thursdays 8 am-2 pm  The Coordinated Entry System is the [facility]'s approach to organizing and providing housing services for people experiencing homelessness in [facility].  Because housing resources are limited, this process is designed to ensure that individuals and families with the highest vulnerability, service needs, and length of homelessness receive top priority in housing placement.    Assessment changes due to COVID-19 virus    [facility] family assessors will now be conducting assessments by phone. If you are working with a family staying in a place other than a [facility]-funded  shelter and is eligible for Coordinated Entry, continue to contact [facility]  at [phone] to set up an assessment.    For single adults, [facility] will be suspending drop-in hours at [facility]. To have a Coordinated Entry assessment completed, call the [facility]' certified assessors: Simeon at [phone] or Ilda at [phone] directly to set up a time to meet    Call 211 at any time on any day for questions about services and resources available to you.    Family Shelters  [facility] Shelter Team  [phone]  [address] [facility], Floors 5 & 6            Youth, families & disabled adults  Hours: Mon-Fri 8:00 am-4:30 pm.  After-Hours Shelter Team  [address]    Drop-Ins  [facility]  [phone]  [address] [place] [address] ([facility] [place])            Showers/Laundry (8-11am)            Health Care            Employment Services            Breakfast (7-8 am)            Lunch (11:30am-12:30pm)  Hours: Mon-Sat: Summer 7am-3pm; Winter 7am-4pm.    [facility] [phone]  [address] [place] [address]  Hours: Mon, Wed and Fri 9:00-11:30 am    Clothing  [facility]  [phone]  [address] [place] [address]  Hours: M-Th 10-11:30am & 1:30-3:30pm; Sat/Sun 9:30-10:30 am    Free Meals & Showers  [facility]  [phone]  [address] [place] [address]  Dinner: Mon-Fri 5:30-6:30pm (No showers)    [facility]  [phone]  Meals ([address]): Women & Children M-F 8:30-9am; Everyone: M-F 12-1pm; Sat/Sun, 10:30-11:30 am  Showers ([address] [place] [address]): Mon-Fri 9-10 am    [facility]  [phone]  [address]  Dinner: Thurs - Mon 6 pm  Showers: Daily 8 - 4:30 pm    Health Care  [facility]  [phone]  Clinics are in [address] [place] [address] shelters/drop-in centers.  Hours: Mon-Fri at varying sites. Call for sites/times. No insurance or appts required to receive care.  Clinic sites: [facility]  [facility], [facility], [facility], [facility], [facility], [facility], [facility], [facility], [facility], [facility], [facility]

## 2023-12-02 ENCOUNTER — HOSPITAL ENCOUNTER (EMERGENCY)
Facility: HOSPITAL | Age: 38
Discharge: HOME | End: 2023-12-03
Attending: EMERGENCY MEDICINE | Admitting: EMERGENCY MEDICINE
Payer: MEDICAID

## 2023-12-02 DIAGNOSIS — Z59.00 HOMELESS: ICD-10-CM

## 2023-12-02 DIAGNOSIS — F19.11 HISTORY OF SUBSTANCE ABUSE (H): ICD-10-CM

## 2023-12-02 DIAGNOSIS — F41.9 ANXIETY: ICD-10-CM

## 2023-12-02 PROCEDURE — 99283 EMERGENCY DEPT VISIT LOW MDM: CPT

## 2023-12-02 PROCEDURE — 87637 SARSCOV2&INF A&B&RSV AMP PRB: CPT | Performed by: EMERGENCY MEDICINE

## 2023-12-02 SDOH — ECONOMIC STABILITY - HOUSING INSECURITY: HOMELESSNESS UNSPECIFIED: Z59.00

## 2023-12-03 VITALS
DIASTOLIC BLOOD PRESSURE: 64 MMHG | OXYGEN SATURATION: 96 % | SYSTOLIC BLOOD PRESSURE: 139 MMHG | HEART RATE: 77 BPM | TEMPERATURE: 97.9 F | RESPIRATION RATE: 18 BRPM

## 2023-12-03 ASSESSMENT — ACTIVITIES OF DAILY LIVING (ADL)
ADLS_ACUITY_SCORE: 35
ADLS_ACUITY_SCORE: 35

## 2023-12-03 NOTE — ED TRIAGE NOTES
Chu presents to triage talking to self with concerns for COVID. States he was recently exposed to someone who has COVID and states he has a cough. Endorses SI without plan but with intent.      Triage Assessment (Adult)       Row Name 12/02/23 7693          Triage Assessment    Airway WDL WDL        Respiratory WDL    Respiratory WDL X;cough        Skin Circulation/Temperature WDL    Skin Circulation/Temperature WDL WDL        Cardiac WDL    Cardiac WDL WDL        Peripheral/Neurovascular WDL    Peripheral Neurovascular WDL WDL

## 2023-12-03 NOTE — ED NOTES
The patient is visibly anxious. He refuses to wear the psych scrubs. He now denies SI. Provider notified. He is heading into the room to talk with the patient now.

## 2023-12-03 NOTE — ED NOTES
Md update on plan for the patient: He is not holdable. He can have his belongings in the room. DEC assessment called.

## 2023-12-03 NOTE — ED NOTES
PT resting calmly in his bed. He has been informed that the DEC assessment will not occure until the morning. He agrees to the plan.

## 2023-12-03 NOTE — ED NOTES
Writer from DEC entered room by cart with ED staff.   Pt began arguing with staff about assessment although pt had agreed to stay in ED for assessment.  Pt continued to argue and refuse assessment.     Pt had denied SI after being in ED for awhile earlier.  Attending physician and writer agree DEC can be canceled, Pt can be discharged as he now says he desires.      NOTE: pt initial presentation for encounter was potential covid exposure.

## 2023-12-03 NOTE — ED PROVIDER NOTES
Emergency Department Encounter     Evaluation Date & Time:   12/2/2023 11:36 PM    CHIEF COMPLAINT:  Flu Symptoms and Anxiety      Triage Note:Chu presents to triage talking to self with concerns for COVID. States he was recently exposed to someone who has COVID and states he has a cough. Endorses SI without plan but with intent.               ED COURSE & MEDICAL DECISION MAKING:     ED Course as of 12/03/23 0437   Sun Dec 03, 2023   0020 Pt electing to leave now. He is not holdable with no discreet SI plan.  Will allow him to leave.   0022 Pt is clinically sober at this time.  They are conversing normally with staff, ambulating independently in the ED and tolerating PO well.  They do not appear to be in any immediate danger to themselves and are appropriate for discharge at this time.      0043 Pt now stating he wants to stay for DEC assessment.  Will let him stay until morning as we have no DEC until 0600 tonight.    Covid/Flu/RSV all negative.   0432 Pt was seen by DEC and pt refusing to speak further, but no SI, not holdable. Will discharge.  I provided chemdep resources for discharge.     Pt with a history of substance abuse, anxiety/depression, brought himself in for evaluation of concerns for covid, but also anxious/upset, frustrated with people around him. Does not have discreet SI or plan.  Pt requesting to speak with someone regarding mental health.  Pt is homeless.  Also reports some cough/congestion, worried about covid, but afebrile, well here. Will send covid/flu and get DEC assessment.      11:50 PM I met with the patient, obtained history, performed an initial exam, and discussed options and plan for diagnostics and treatment here in the ED.     Medical Decision Making    History:  Supplemental history from: Documented in chart, if applicable  External Record(s) reviewed: Documented in chart, if applicable.    Work Up:  Chart documentation includes differential considered and any EKGs or imaging  independently interpreted by provider, where specified.  In additional to work up documented, I considered the following work up: Documented in chart, if applicable.    External consultation:  Discussion of management with another provider: Documented in chart, if applicable    Complicating factors:  Care impacted by chronic illness: Mental Health  Care affected by social determinants of health: Alcohol Abuse and/or Recreational Drug Use    Disposition considerations: Admission considered. Patient was signed out to the oncoming physician, disposition pending.      At the conclusion of the encounter I discussed the results of all the tests and the disposition. The questions were answered. The patient or family acknowledged understanding and was agreeable with the care plan.      MEDICATIONS GIVEN IN THE EMERGENCY DEPARTMENT:  Medications - No data to display    NEW PRESCRIPTIONS STARTED AT TODAY'S ED VISIT:  New Prescriptions    No medications on file       HPI   HPI     Chu Pimentel is a 38 year old male with a pertinent history of hypertension, anxiety, depression, tobacco abuse, obesity, ADHD, methamphetamine abuse, and psychosis who presents to this ED by walking for evaluation of flu symptoms and anxiety.    Patient reports concern for Covid since he was recently exposed to someone with Covid. He has a cough and congestion and can't smell. Patient also endorses auditory hallucinations and suicidal ideation without a plan. He says he's frustrated with people taking his things, anxiety attacks, and homelessness. He requests mental health resources. Patient does not have medication for his mental health.     Chart Review: 11/11/23 ED visit at [facility] for cold extremities. Patient is homeless and missed his opportunity to stay in the shelters this night. He has auditory hallucinations and passive suicidal thoughts.    REVIEW OF SYSTEMS:  See HPI      Medical History     Past Medical History:   Diagnosis Date     Anxiety     Depression     Drug abuse (H)     HTN (hypertension)     Hypertension     Obesity        Past Surgical History:   Procedure Laterality Date    CHOLECYSTECTOMY  2012       Family History   Problem Relation Age of Onset    Hypertension Mother     Hypertension Father        Social History     Tobacco Use    Smoking status: Every Day     Packs/day: 1.5     Types: Cigarettes    Smokeless tobacco: Never   Substance Use Topics    Alcohol use: Yes     Comment: a few times a month    Drug use: Yes     Frequency: 3.0 times per week     Types: Marijuana, Methamphetamines     Comment: last use today       amphetamine-dextroamphetamine (ADDERALL) 20 MG tablet  escitalopram (LEXAPRO) 10 MG tablet  traZODone (DESYREL) 50 MG tablet        Physical Exam     Vitals:  /64   Pulse 77   Temp 97.9  F (36.6  C) (Oral)   Resp 18   SpO2 96%     PHYSICAL EXAM:   Physical Exam  Vitals and nursing note reviewed.   Constitutional:       General: He is not in acute distress.     Appearance: Normal appearance.      Comments: Pressured speech.   HENT:      Head: Normocephalic and atraumatic.      Nose: Nose normal.      Mouth/Throat:      Mouth: Mucous membranes are moist.   Eyes:      Extraocular Movements: Extraocular movements intact.   Cardiovascular:      Rate and Rhythm: Normal rate and regular rhythm.      Pulses: Normal pulses.           Radial pulses are 2+ on the right side and 2+ on the left side.        Dorsalis pedis pulses are 2+ on the right side and 2+ on the left side.   Pulmonary:      Effort: Pulmonary effort is normal.   Skin:     Findings: No rash.   Neurological:      General: No focal deficit present.      Mental Status: He is alert. Mental status is at baseline.      Comments: Fluent speech   Psychiatric:         Mood and Affect: Mood normal.         Behavior: Behavior normal.      Comments: Paranoid thinking. Not suicidal. Easily agitated with labile mood.         Results     LAB:  All pertinent labs  reviewed and interpreted  Labs Ordered and Resulted from Time of ED Arrival to Time of ED Departure   INFLUENZA A/B, RSV, & SARS-COV2 PCR - Normal       Result Value    Influenza A PCR Negative      Influenza B PCR Negative      RSV PCR Negative      SARS CoV2 PCR Negative         RADIOLOGY:  No orders to display                ECG:  none    PROCEDURES:  Procedures:  none      FINAL IMPRESSION:    ICD-10-CM    1. Homeless  Z59.00       2. Anxiety  F41.9       3. History of substance abuse (H)  F19.11           0 minutes of critical care time      I, Frannie Garcia, am serving as a scribe to document services personally performed by Dr. Manuel Duque, based on my observations and the provider's statements to me. I, Manuel Duque, DO attest that Frannie Garcia is acting in a scribe capacity, has observed my performance of the services and has documented them in accordance with my direction.      Manuel Duque, DO  Emergency Medicine  [facility] EMERGENCY DEPARTMENT  12/2/2023  11:40 PM             Manuel Duque MD  12/03/23 0437

## 2024-03-09 ENCOUNTER — HEALTH MAINTENANCE LETTER (OUTPATIENT)
Age: 39
End: 2024-03-09

## 2025-03-16 ENCOUNTER — HEALTH MAINTENANCE LETTER (OUTPATIENT)
Age: 40
End: 2025-03-16